# Patient Record
Sex: FEMALE | Race: WHITE | Employment: UNEMPLOYED | URBAN - METROPOLITAN AREA
[De-identification: names, ages, dates, MRNs, and addresses within clinical notes are randomized per-mention and may not be internally consistent; named-entity substitution may affect disease eponyms.]

---

## 2017-08-21 PROBLEM — Z72.0 TOBACCO ABUSE: Status: ACTIVE | Noted: 2017-08-21

## 2017-11-07 ENCOUNTER — HOSPITAL ENCOUNTER (OUTPATIENT)
Age: 57
Discharge: HOME | End: 2017-11-07
Payer: COMMERCIAL

## 2017-11-07 DIAGNOSIS — R25.2 MUSCLE CRAMPS: ICD-10-CM

## 2017-11-07 DIAGNOSIS — E78.5 HYPERLIPIDEMIA, UNSPECIFIED HYPERLIPIDEMIA TYPE: ICD-10-CM

## 2017-11-07 DIAGNOSIS — I10 ESSENTIAL HYPERTENSION: ICD-10-CM

## 2017-11-07 LAB
ALBUMIN SERPL-MCNC: 4.9 G/DL (ref 3.5–5.2)
ALBUMIN/GLOBULIN RATIO: ABNORMAL (ref 1–2.5)
ALP BLD-CCNC: 146 U/L (ref 35–104)
ALT SERPL-CCNC: 25 U/L (ref 5–33)
ANION GAP SERPL CALCULATED.3IONS-SCNC: 18 MMOL/L (ref 9–17)
AST SERPL-CCNC: 31 U/L
BILIRUB SERPL-MCNC: 0.94 MG/DL (ref 0.3–1.2)
BUN BLDV-MCNC: 11 MG/DL (ref 6–20)
BUN/CREAT BLD: 14 (ref 9–20)
CALCIUM SERPL-MCNC: 9.9 MG/DL (ref 8.6–10.4)
CHLORIDE BLD-SCNC: 86 MMOL/L (ref 98–107)
CHOLESTEROL, FASTING: 165 MG/DL
CHOLESTEROL/HDL RATIO: 2.6
CO2: 26 MMOL/L (ref 20–31)
CREAT SERPL-MCNC: 0.8 MG/DL (ref 0.5–0.9)
GFR AFRICAN AMERICAN: >60 ML/MIN
GFR NON-AFRICAN AMERICAN: >60 ML/MIN
GFR SERPL CREATININE-BSD FRML MDRD: ABNORMAL ML/MIN/{1.73_M2}
GFR SERPL CREATININE-BSD FRML MDRD: ABNORMAL ML/MIN/{1.73_M2}
GLUCOSE BLD-MCNC: 115 MG/DL (ref 70–99)
HDLC SERPL-MCNC: 63 MG/DL
LACTATE DEHYDROGENASE: 234 U/L (ref 135–214)
LDL CHOLESTEROL: 66 MG/DL (ref 0–130)
POTASSIUM SERPL-SCNC: 4 MMOL/L (ref 3.7–5.3)
SODIUM BLD-SCNC: 130 MMOL/L (ref 135–144)
TOTAL PROTEIN: 8.5 G/DL (ref 6.4–8.3)
TRIGLYCERIDE, FASTING: 180 MG/DL
VLDLC SERPL CALC-MCNC: ABNORMAL MG/DL (ref 1–30)

## 2017-11-07 PROCEDURE — 80053 COMPREHEN METABOLIC PANEL: CPT

## 2017-11-07 PROCEDURE — 83615 LACTATE (LD) (LDH) ENZYME: CPT

## 2017-11-07 PROCEDURE — 80061 LIPID PANEL: CPT

## 2017-11-07 PROCEDURE — 36415 COLL VENOUS BLD VENIPUNCTURE: CPT

## 2018-01-19 ENCOUNTER — HOSPITAL ENCOUNTER (OUTPATIENT)
Age: 58
Discharge: HOME | End: 2018-01-19
Payer: COMMERCIAL

## 2018-01-19 DIAGNOSIS — R89.9 ABNORMAL LABORATORY TEST: ICD-10-CM

## 2018-01-19 DIAGNOSIS — E78.5 HYPERLIPIDEMIA, UNSPECIFIED HYPERLIPIDEMIA TYPE: ICD-10-CM

## 2018-01-19 LAB
CHOLESTEROL/HDL RATIO: 4.6
CHOLESTEROL: 256 MG/DL
HDLC SERPL-MCNC: 56 MG/DL
LACTATE DEHYDROGENASE: 160 U/L (ref 135–214)
LDL CHOLESTEROL: 160 MG/DL (ref 0–130)
TRIGL SERPL-MCNC: 200 MG/DL
VLDLC SERPL CALC-MCNC: ABNORMAL MG/DL (ref 1–30)

## 2018-01-19 PROCEDURE — 83615 LACTATE (LD) (LDH) ENZYME: CPT

## 2018-01-19 PROCEDURE — 80061 LIPID PANEL: CPT

## 2018-01-19 PROCEDURE — 36415 COLL VENOUS BLD VENIPUNCTURE: CPT

## 2018-02-17 ENCOUNTER — HOSPITAL ENCOUNTER (OUTPATIENT)
Dept: MAMMOGRAPHY | Age: 58
Discharge: HOME | End: 2018-02-19
Payer: COMMERCIAL

## 2018-02-17 DIAGNOSIS — Z12.31 ENCOUNTER FOR SCREENING MAMMOGRAM FOR BREAST CANCER: ICD-10-CM

## 2018-03-03 ENCOUNTER — HOSPITAL ENCOUNTER (OUTPATIENT)
Dept: MAMMOGRAPHY | Age: 58
Discharge: HOME | End: 2018-03-05
Payer: COMMERCIAL

## 2018-03-03 DIAGNOSIS — Z12.39 BREAST CANCER SCREENING: ICD-10-CM

## 2018-03-03 DIAGNOSIS — Z12.31 ENCOUNTER FOR SCREENING MAMMOGRAM FOR BREAST CANCER: ICD-10-CM

## 2018-03-03 PROCEDURE — 77067 SCR MAMMO BI INCL CAD: CPT

## 2018-03-03 PROCEDURE — 77063 BREAST TOMOSYNTHESIS BI: CPT

## 2018-06-08 ENCOUNTER — HOSPITAL ENCOUNTER (OUTPATIENT)
Age: 58
Discharge: HOME | End: 2018-06-08
Payer: COMMERCIAL

## 2018-06-08 DIAGNOSIS — E78.5 HYPERLIPIDEMIA, UNSPECIFIED HYPERLIPIDEMIA TYPE: ICD-10-CM

## 2018-06-08 LAB
CHOLESTEROL, FASTING: 203 MG/DL
CHOLESTEROL/HDL RATIO: 3
HDLC SERPL-MCNC: 67 MG/DL
LACTATE DEHYDROGENASE: 160 U/L (ref 135–214)
LDL CHOLESTEROL: 85 MG/DL (ref 0–130)
TRIGLYCERIDE, FASTING: 257 MG/DL
VLDLC SERPL CALC-MCNC: ABNORMAL MG/DL (ref 1–30)

## 2018-06-08 PROCEDURE — 80061 LIPID PANEL: CPT

## 2018-06-08 PROCEDURE — 36415 COLL VENOUS BLD VENIPUNCTURE: CPT

## 2018-06-08 PROCEDURE — 83615 LACTATE (LD) (LDH) ENZYME: CPT

## 2018-08-20 PROBLEM — Z72.0 TOBACCO ABUSE: Status: RESOLVED | Noted: 2017-08-21 | Resolved: 2018-08-20

## 2019-03-07 ENCOUNTER — HOSPITAL ENCOUNTER (OUTPATIENT)
Age: 59
Discharge: HOME | End: 2019-03-07
Payer: COMMERCIAL

## 2019-03-07 DIAGNOSIS — I10 ESSENTIAL HYPERTENSION: ICD-10-CM

## 2019-03-07 DIAGNOSIS — R73.09 ELEVATED GLUCOSE LEVEL: ICD-10-CM

## 2019-03-07 LAB
ANION GAP SERPL CALCULATED.3IONS-SCNC: 16 MMOL/L (ref 9–17)
BUN BLDV-MCNC: 12 MG/DL (ref 6–20)
BUN/CREAT BLD: ABNORMAL (ref 9–20)
CALCIUM SERPL-MCNC: 9.4 MG/DL (ref 8.6–10.4)
CHLORIDE BLD-SCNC: 99 MMOL/L (ref 98–107)
CO2: 22 MMOL/L (ref 20–31)
CREAT SERPL-MCNC: 0.81 MG/DL (ref 0.5–0.9)
ESTIMATED AVERAGE GLUCOSE: 120 MG/DL
GFR AFRICAN AMERICAN: >60 ML/MIN
GFR NON-AFRICAN AMERICAN: >60 ML/MIN
GFR SERPL CREATININE-BSD FRML MDRD: ABNORMAL ML/MIN/{1.73_M2}
GFR SERPL CREATININE-BSD FRML MDRD: ABNORMAL ML/MIN/{1.73_M2}
GLUCOSE FASTING: 125 MG/DL (ref 70–99)
HBA1C MFR BLD: 5.8 % (ref 4–6)
POTASSIUM SERPL-SCNC: 4.8 MMOL/L (ref 3.7–5.3)
SODIUM BLD-SCNC: 137 MMOL/L (ref 135–144)

## 2019-03-07 PROCEDURE — 36415 COLL VENOUS BLD VENIPUNCTURE: CPT

## 2019-03-07 PROCEDURE — 80048 BASIC METABOLIC PNL TOTAL CA: CPT

## 2019-03-07 PROCEDURE — 83036 HEMOGLOBIN GLYCOSYLATED A1C: CPT

## 2019-08-13 PROBLEM — F41.0 PANIC DISORDER: Status: ACTIVE | Noted: 2019-08-13

## 2019-11-08 ENCOUNTER — HOSPITAL ENCOUNTER (OUTPATIENT)
Age: 59
Discharge: HOME | End: 2019-11-08
Payer: COMMERCIAL

## 2019-11-08 DIAGNOSIS — E78.5 HYPERLIPIDEMIA, UNSPECIFIED HYPERLIPIDEMIA TYPE: ICD-10-CM

## 2019-11-08 LAB
CHOLESTEROL, FASTING: 182 MG/DL
CHOLESTEROL/HDL RATIO: 3.3
HDLC SERPL-MCNC: 55 MG/DL
LDL CHOLESTEROL: 95 MG/DL (ref 0–130)
TRIGLYCERIDE, FASTING: 159 MG/DL
VLDLC SERPL CALC-MCNC: ABNORMAL MG/DL (ref 1–30)

## 2019-11-08 PROCEDURE — 80061 LIPID PANEL: CPT

## 2019-11-08 PROCEDURE — 36415 COLL VENOUS BLD VENIPUNCTURE: CPT

## 2020-11-04 PROBLEM — R73.01 IFG (IMPAIRED FASTING GLUCOSE): Status: ACTIVE | Noted: 2020-11-04

## 2020-11-18 ENCOUNTER — HOSPITAL ENCOUNTER (OUTPATIENT)
Age: 60
Discharge: HOME | End: 2020-11-18
Payer: COMMERCIAL

## 2020-11-18 ENCOUNTER — HOSPITAL ENCOUNTER (OUTPATIENT)
Dept: MAMMOGRAPHY | Age: 60
Discharge: HOME | End: 2020-11-20
Payer: COMMERCIAL

## 2020-11-18 LAB
ALBUMIN SERPL-MCNC: 4.3 G/DL (ref 3.5–5.2)
ALBUMIN/GLOBULIN RATIO: 1.2 (ref 1–2.5)
ALP BLD-CCNC: 90 U/L (ref 35–104)
ALT SERPL-CCNC: 21 U/L (ref 5–33)
ANION GAP SERPL CALCULATED.3IONS-SCNC: 15 MMOL/L (ref 9–17)
AST SERPL-CCNC: 25 U/L
BILIRUB SERPL-MCNC: 0.45 MG/DL (ref 0.3–1.2)
BUN BLDV-MCNC: 10 MG/DL (ref 6–20)
BUN/CREAT BLD: ABNORMAL (ref 9–20)
CALCIUM SERPL-MCNC: 9.4 MG/DL (ref 8.6–10.4)
CHLORIDE BLD-SCNC: 98 MMOL/L (ref 98–107)
CHOLESTEROL, FASTING: 189 MG/DL
CHOLESTEROL/HDL RATIO: 3.4
CO2: 22 MMOL/L (ref 20–31)
CREAT SERPL-MCNC: 0.65 MG/DL (ref 0.5–0.9)
ESTIMATED AVERAGE GLUCOSE: 117 MG/DL
GFR AFRICAN AMERICAN: >60 ML/MIN
GFR NON-AFRICAN AMERICAN: >60 ML/MIN
GFR SERPL CREATININE-BSD FRML MDRD: ABNORMAL ML/MIN/{1.73_M2}
GFR SERPL CREATININE-BSD FRML MDRD: ABNORMAL ML/MIN/{1.73_M2}
GLUCOSE FASTING: 128 MG/DL (ref 70–99)
HBA1C MFR BLD: 5.7 % (ref 4–6)
HDLC SERPL-MCNC: 56 MG/DL
LDL CHOLESTEROL: 102 MG/DL (ref 0–130)
POTASSIUM SERPL-SCNC: 4.7 MMOL/L (ref 3.7–5.3)
SODIUM BLD-SCNC: 135 MMOL/L (ref 135–144)
TOTAL PROTEIN: 7.8 G/DL (ref 6.4–8.3)
TRIGLYCERIDE, FASTING: 153 MG/DL
VLDLC SERPL CALC-MCNC: ABNORMAL MG/DL (ref 1–30)

## 2020-11-18 PROCEDURE — 83036 HEMOGLOBIN GLYCOSYLATED A1C: CPT

## 2020-11-18 PROCEDURE — 77063 BREAST TOMOSYNTHESIS BI: CPT

## 2020-11-18 PROCEDURE — 80053 COMPREHEN METABOLIC PANEL: CPT

## 2020-11-18 PROCEDURE — 80061 LIPID PANEL: CPT

## 2020-11-18 PROCEDURE — 36415 COLL VENOUS BLD VENIPUNCTURE: CPT

## 2021-10-26 ENCOUNTER — HOSPITAL ENCOUNTER (OUTPATIENT)
Age: 61
Discharge: HOME | End: 2021-10-26
Payer: COMMERCIAL

## 2021-10-26 ENCOUNTER — HOSPITAL ENCOUNTER (OUTPATIENT)
Age: 61
Setting detail: SPECIMEN
Discharge: HOME | End: 2021-10-26
Payer: COMMERCIAL

## 2021-10-26 DIAGNOSIS — I10 ESSENTIAL HYPERTENSION: ICD-10-CM

## 2021-10-26 DIAGNOSIS — R73.01 IFG (IMPAIRED FASTING GLUCOSE): ICD-10-CM

## 2021-10-26 DIAGNOSIS — F41.0 PANIC DISORDER: ICD-10-CM

## 2021-10-26 DIAGNOSIS — E78.5 HYPERLIPIDEMIA, UNSPECIFIED HYPERLIPIDEMIA TYPE: ICD-10-CM

## 2021-10-26 LAB
ALBUMIN SERPL-MCNC: 4.7 G/DL (ref 3.5–5.2)
ALBUMIN/GLOBULIN RATIO: 1.3 (ref 1–2.5)
ALP BLD-CCNC: 95 U/L (ref 35–104)
ALT SERPL-CCNC: 17 U/L (ref 5–33)
ANION GAP SERPL CALCULATED.3IONS-SCNC: 15 MMOL/L (ref 9–17)
AST SERPL-CCNC: 22 U/L
BILIRUB SERPL-MCNC: 0.56 MG/DL (ref 0.3–1.2)
BUN BLDV-MCNC: 11 MG/DL (ref 8–23)
BUN/CREAT BLD: ABNORMAL (ref 9–20)
CALCIUM SERPL-MCNC: 9.8 MG/DL (ref 8.6–10.4)
CHLORIDE BLD-SCNC: 92 MMOL/L (ref 98–107)
CHOLESTEROL, FASTING: 189 MG/DL
CHOLESTEROL/HDL RATIO: 3
CO2: 22 MMOL/L (ref 20–31)
CREAT SERPL-MCNC: 0.68 MG/DL (ref 0.5–0.9)
GFR AFRICAN AMERICAN: >60 ML/MIN
GFR NON-AFRICAN AMERICAN: >60 ML/MIN
GFR SERPL CREATININE-BSD FRML MDRD: ABNORMAL ML/MIN/{1.73_M2}
GFR SERPL CREATININE-BSD FRML MDRD: ABNORMAL ML/MIN/{1.73_M2}
GLUCOSE FASTING: 121 MG/DL (ref 70–99)
HDLC SERPL-MCNC: 63 MG/DL
LDL CHOLESTEROL: 103 MG/DL (ref 0–130)
POTASSIUM SERPL-SCNC: 4.5 MMOL/L (ref 3.7–5.3)
SODIUM BLD-SCNC: 129 MMOL/L (ref 135–144)
TOTAL PROTEIN: 8.2 G/DL (ref 6.4–8.3)
TRIGLYCERIDE, FASTING: 114 MG/DL
TSH SERPL DL<=0.05 MIU/L-ACNC: 2.33 MIU/L (ref 0.3–5)
VLDLC SERPL CALC-MCNC: NORMAL MG/DL (ref 1–30)

## 2021-10-26 PROCEDURE — 36415 COLL VENOUS BLD VENIPUNCTURE: CPT

## 2021-10-26 PROCEDURE — 80061 LIPID PANEL: CPT

## 2021-10-26 PROCEDURE — 84443 ASSAY THYROID STIM HORMONE: CPT

## 2021-10-26 PROCEDURE — 83036 HEMOGLOBIN GLYCOSYLATED A1C: CPT

## 2021-10-26 PROCEDURE — 80053 COMPREHEN METABOLIC PANEL: CPT

## 2021-10-27 LAB
ESTIMATED AVERAGE GLUCOSE: 117 MG/DL
HBA1C MFR BLD: 5.7 % (ref 4–6)

## 2021-10-28 LAB
HPV SAMPLE: NORMAL
HPV, GENOTYPE 16: NOT DETECTED
HPV, GENOTYPE 18: NOT DETECTED
HPV, HIGH RISK OTHER: NOT DETECTED
HPV, INTERPRETATION: NORMAL
SPECIMEN DESCRIPTION: NORMAL

## 2021-11-02 LAB — CYTOLOGY REPORT: NORMAL

## 2021-12-15 ENCOUNTER — HOSPITAL ENCOUNTER (OUTPATIENT)
Dept: MAMMOGRAPHY | Age: 61
Discharge: HOME | End: 2021-12-17
Payer: COMMERCIAL

## 2021-12-15 DIAGNOSIS — Z12.31 ENCOUNTER FOR SCREENING MAMMOGRAM FOR MALIGNANT NEOPLASM OF BREAST: ICD-10-CM

## 2021-12-15 PROCEDURE — 77063 BREAST TOMOSYNTHESIS BI: CPT

## 2022-11-03 ENCOUNTER — HOSPITAL ENCOUNTER (OUTPATIENT)
Age: 62
Setting detail: SPECIMEN
Discharge: HOME | End: 2022-11-03

## 2022-11-03 DIAGNOSIS — R73.01 IFG (IMPAIRED FASTING GLUCOSE): ICD-10-CM

## 2022-11-03 DIAGNOSIS — R21 RASH: ICD-10-CM

## 2022-11-03 DIAGNOSIS — E78.5 HYPERLIPIDEMIA, UNSPECIFIED HYPERLIPIDEMIA TYPE: ICD-10-CM

## 2022-11-03 DIAGNOSIS — I10 ESSENTIAL HYPERTENSION: ICD-10-CM

## 2022-11-03 LAB
ABSOLUTE EOS #: 0.27 K/UL (ref 0–0.44)
ABSOLUTE IMMATURE GRANULOCYTE: 0.07 K/UL (ref 0–0.3)
ABSOLUTE LYMPH #: 1.84 K/UL (ref 1.1–3.7)
ABSOLUTE MONO #: 1.07 K/UL (ref 0.1–1.2)
ALBUMIN SERPL-MCNC: 4.2 G/DL (ref 3.5–5.2)
ALBUMIN/GLOBULIN RATIO: 1.1 (ref 1–2.5)
ALP BLD-CCNC: 96 U/L (ref 35–104)
ALT SERPL-CCNC: 20 U/L (ref 5–33)
ANION GAP SERPL CALCULATED.3IONS-SCNC: 19 MMOL/L (ref 9–17)
AST SERPL-CCNC: 22 U/L
BASOPHILS # BLD: 1 % (ref 0–2)
BASOPHILS ABSOLUTE: 0.08 K/UL (ref 0–0.2)
BILIRUB SERPL-MCNC: 0.5 MG/DL (ref 0.3–1.2)
BUN BLDV-MCNC: 9 MG/DL (ref 8–23)
CALCIUM SERPL-MCNC: 9.8 MG/DL (ref 8.6–10.4)
CHLORIDE BLD-SCNC: 89 MMOL/L (ref 98–107)
CHOLESTEROL, FASTING: 186 MG/DL
CHOLESTEROL/HDL RATIO: 3.4
CO2: 24 MMOL/L (ref 20–31)
CREAT SERPL-MCNC: 0.71 MG/DL (ref 0.5–0.9)
EOSINOPHILS RELATIVE PERCENT: 2 % (ref 1–4)
ESTIMATED AVERAGE GLUCOSE: 140 MG/DL
GFR SERPL CREATININE-BSD FRML MDRD: >60 ML/MIN/1.73M2
GLUCOSE FASTING: 146 MG/DL (ref 70–99)
HBA1C MFR BLD: 6.5 % (ref 4–6)
HCT VFR BLD CALC: 40.4 % (ref 36.3–47.1)
HDLC SERPL-MCNC: 55 MG/DL
HEMOGLOBIN: 13.6 G/DL (ref 11.9–15.1)
IMMATURE GRANULOCYTES: 1 %
LDL CHOLESTEROL: 102 MG/DL (ref 0–130)
LYMPHOCYTES # BLD: 13 % (ref 24–43)
MCH RBC QN AUTO: 30.2 PG (ref 25.2–33.5)
MCHC RBC AUTO-ENTMCNC: 33.7 G/DL (ref 28.4–34.8)
MCV RBC AUTO: 89.8 FL (ref 82.6–102.9)
MONOCYTES # BLD: 7 % (ref 3–12)
NRBC AUTOMATED: 0 PER 100 WBC
PDW BLD-RTO: 12.8 % (ref 11.8–14.4)
PLATELET # BLD: 265 K/UL (ref 138–453)
PMV BLD AUTO: 11.5 FL (ref 8.1–13.5)
POTASSIUM SERPL-SCNC: 4.7 MMOL/L (ref 3.7–5.3)
RBC # BLD: 4.5 M/UL (ref 3.95–5.11)
SEG NEUTROPHILS: 76 % (ref 36–65)
SEGMENTED NEUTROPHILS ABSOLUTE COUNT: 11.18 K/UL (ref 1.5–8.1)
SODIUM BLD-SCNC: 132 MMOL/L (ref 135–144)
TOTAL PROTEIN: 8.1 G/DL (ref 6.4–8.3)
TRIGLYCERIDE, FASTING: 145 MG/DL
TSH SERPL DL<=0.05 MIU/L-ACNC: 0.56 UIU/ML (ref 0.3–5)
WBC # BLD: 14.5 K/UL (ref 3.5–11.3)

## 2023-05-02 ENCOUNTER — OFFICE VISIT (OUTPATIENT)
Dept: GYNECOLOGIC ONCOLOGY | Age: 63
End: 2023-05-02

## 2023-05-02 ENCOUNTER — HOSPITAL ENCOUNTER (OUTPATIENT)
Age: 63
Discharge: HOME | End: 2023-05-02
Payer: COMMERCIAL

## 2023-05-02 ENCOUNTER — HOSPITAL ENCOUNTER (OUTPATIENT)
Age: 63
Setting detail: SPECIMEN
Discharge: HOME | End: 2023-05-02

## 2023-05-02 VITALS
WEIGHT: 221.2 LBS | OXYGEN SATURATION: 94 % | DIASTOLIC BLOOD PRESSURE: 82 MMHG | HEIGHT: 63 IN | SYSTOLIC BLOOD PRESSURE: 138 MMHG | HEART RATE: 99 BPM | TEMPERATURE: 97.2 F | BODY MASS INDEX: 39.19 KG/M2

## 2023-05-02 DIAGNOSIS — C53.8 MALIGNANT NEOPLASM OF OVERLAPPING SITES OF CERVIX UTERI (HCC): Primary | ICD-10-CM

## 2023-05-02 DIAGNOSIS — N88.8 MASS OF CERVIX: ICD-10-CM

## 2023-05-02 DIAGNOSIS — C53.8 MALIGNANT NEOPLASM OF OVERLAPPING SITES OF CERVIX UTERI (HCC): ICD-10-CM

## 2023-05-02 LAB
ABSOLUTE EOS #: 0.15 K/UL (ref 0–0.44)
ABSOLUTE IMMATURE GRANULOCYTE: 0.09 K/UL (ref 0–0.3)
ABSOLUTE LYMPH #: 2.6 K/UL (ref 1.1–3.7)
ABSOLUTE MONO #: 0.82 K/UL (ref 0.1–1.2)
BASOPHILS # BLD: 1 % (ref 0–2)
BASOPHILS ABSOLUTE: 0.08 K/UL (ref 0–0.2)
EOSINOPHILS RELATIVE PERCENT: 1 % (ref 1–4)
HCT VFR BLD AUTO: 37.6 % (ref 36.3–47.1)
HGB BLD-MCNC: 13.1 G/DL (ref 11.9–15.1)
IMMATURE GRANULOCYTES: 1 %
LYMPHOCYTES # BLD: 23 % (ref 24–43)
MCH RBC QN AUTO: 31.4 PG (ref 25.2–33.5)
MCHC RBC AUTO-ENTMCNC: 34.8 G/DL (ref 28.4–34.8)
MCV RBC AUTO: 90.2 FL (ref 82.6–102.9)
MONOCYTES # BLD: 7 % (ref 3–12)
NRBC AUTOMATED: 0 PER 100 WBC
PDW BLD-RTO: 13.2 % (ref 11.8–14.4)
PLATELET # BLD AUTO: 218 K/UL (ref 138–453)
PMV BLD AUTO: 11.3 FL (ref 8.1–13.5)
RBC # BLD: 4.17 M/UL (ref 3.95–5.11)
SEG NEUTROPHILS: 67 % (ref 36–65)
SEGMENTED NEUTROPHILS ABSOLUTE COUNT: 7.5 K/UL (ref 1.5–8.1)
WBC # BLD AUTO: 11.2 K/UL (ref 3.5–11.3)

## 2023-05-02 PROCEDURE — 85025 COMPLETE CBC W/AUTO DIFF WBC: CPT

## 2023-05-02 PROCEDURE — 80053 COMPREHEN METABOLIC PANEL: CPT

## 2023-05-02 PROCEDURE — 36415 COLL VENOUS BLD VENIPUNCTURE: CPT

## 2023-05-02 ASSESSMENT — ENCOUNTER SYMPTOMS
GASTROINTESTINAL NEGATIVE: 1
RESPIRATORY NEGATIVE: 1
EYES NEGATIVE: 1

## 2023-05-03 ENCOUNTER — TELEPHONE (OUTPATIENT)
Dept: GYNECOLOGIC ONCOLOGY | Age: 63
End: 2023-05-03

## 2023-05-03 LAB
ALBUMIN SERPL-MCNC: 4.5 G/DL (ref 3.5–5.2)
ALBUMIN/GLOBULIN RATIO: 1.1 (ref 1–2.5)
ALP SERPL-CCNC: 103 U/L (ref 35–104)
ALT SERPL-CCNC: 22 U/L (ref 5–33)
ANION GAP SERPL CALCULATED.3IONS-SCNC: 28 MMOL/L (ref 9–17)
AST SERPL-CCNC: 38 U/L
BILIRUB SERPL-MCNC: 0.5 MG/DL (ref 0.3–1.2)
BUN SERPL-MCNC: 11 MG/DL (ref 8–23)
CALCIUM SERPL-MCNC: 11 MG/DL (ref 8.6–10.4)
CHLORIDE SERPL-SCNC: 104 MMOL/L (ref 98–107)
CO2 SERPL-SCNC: 15 MMOL/L (ref 20–31)
CREAT SERPL-MCNC: 0.8 MG/DL (ref 0.5–0.9)
GFR SERPL CREATININE-BSD FRML MDRD: >60 ML/MIN/1.73M2
GLUCOSE SERPL-MCNC: 138 MG/DL (ref 70–99)
POTASSIUM SERPL-SCNC: 4.8 MMOL/L (ref 3.7–5.3)
PROT SERPL-MCNC: 8.6 G/DL (ref 6.4–8.3)
SODIUM SERPL-SCNC: 147 MMOL/L (ref 135–144)

## 2023-05-03 NOTE — TELEPHONE ENCOUNTER
BENEDICT for patient ,notifying her that the Video visit was moved to 5/12/23 @12:45pm with Dr. Earlene Valdovinos

## 2023-05-04 ENCOUNTER — TELEPHONE (OUTPATIENT)
Dept: GYNECOLOGIC ONCOLOGY | Age: 63
End: 2023-05-04

## 2023-05-04 NOTE — TELEPHONE ENCOUNTER
Was seen 2 days ago and she has some spotting last night had to put a pad on to sleep and today is very light is this normal?  No other systems

## 2023-05-04 NOTE — RESULT ENCOUNTER NOTE
Please inform the patient regarding the normal CBC and CMP, with the exception of Blood Glucose of 138 (H), Calcium 11.0 (H), Sodium 147 (H), CO2 15 (L), Anion Gap 28 (H) and AST 38 (H).      Awaiting biopsy results    PET-scan noted to be scheduled for 05/12.      Thank you for your assistance,  Edmar Campos MD

## 2023-05-05 LAB — SURGICAL PATHOLOGY REPORT: NORMAL

## 2023-05-05 NOTE — RESULT ENCOUNTER NOTE
Called and spoke to patient regarding her results. Patient voiced understanding and stated she will call her pcp regarding high results. I let her know we will call her when we get biopsy results.

## 2023-05-05 NOTE — TELEPHONE ENCOUNTER
Called patient with Dr. Eli Morillo response. Patient voiced understanding and states that as of right now, the bleeding has stopped.

## 2023-05-08 ENCOUNTER — TELEPHONE (OUTPATIENT)
Dept: GYNECOLOGIC ONCOLOGY | Age: 63
End: 2023-05-08

## 2023-05-08 ENCOUNTER — HOSPITAL ENCOUNTER (OUTPATIENT)
Age: 63
Discharge: HOME | End: 2023-05-08
Payer: COMMERCIAL

## 2023-05-08 DIAGNOSIS — C53.8 MALIGNANT NEOPLASM OF OVERLAPPING SITES OF CERVIX UTERI (HCC): Primary | ICD-10-CM

## 2023-05-08 DIAGNOSIS — R89.9 ABNORMAL LABORATORY TEST RESULT: ICD-10-CM

## 2023-05-08 LAB
25(OH)D3 SERPL-MCNC: 34.3 NG/ML
EST. AVERAGE GLUCOSE BLD GHB EST-MCNC: 140 MG/DL
HBA1C MFR BLD: 6.5 % (ref 4–6)
HCT VFR BLD AUTO: 36.9 % (ref 36.3–47.1)
HGB BLD-MCNC: 12.6 G/DL (ref 11.9–15.1)

## 2023-05-08 PROCEDURE — 85018 HEMOGLOBIN: CPT

## 2023-05-08 PROCEDURE — 83970 ASSAY OF PARATHORMONE: CPT

## 2023-05-08 PROCEDURE — 83036 HEMOGLOBIN GLYCOSYLATED A1C: CPT

## 2023-05-08 PROCEDURE — 85014 HEMATOCRIT: CPT

## 2023-05-08 PROCEDURE — 82330 ASSAY OF CALCIUM: CPT

## 2023-05-08 PROCEDURE — 36415 COLL VENOUS BLD VENIPUNCTURE: CPT

## 2023-05-08 PROCEDURE — 82306 VITAMIN D 25 HYDROXY: CPT

## 2023-05-08 NOTE — TELEPHONE ENCOUNTER
Pt called stating that since biopsy on 5/2/23 she is experiencing heavier vaginal bleeding - saturating 3 pads at night (8 hour period) with thick clotting and increased fatigue. Pt does state that the bleeding is heavier at night. Pt denies being lightheaded and no abdominal pain. Please advise pt.

## 2023-05-08 NOTE — TELEPHONE ENCOUNTER
Returned call to discuss patient symptoms. Patient had labs drawn today and we will add on H&H given her fatigue to ensure stability. Reviewed CBC from 5/2/23 Hbg 13.1. We reviewed expected symptomology of bleeding and when to notify office if heavy bleeding occurs. We then reviewed her biopsy positive for squamous cell carcinoma of the cervix. I placed a referral to radiation oncology to schedule NP appointment next week. She is understandable PET/CT scan on 5/12 and appointment to follow with Dr. Sophy Terry to discuss definitive care plan. Patient was understanding of diagnosis and care plan.

## 2023-05-09 LAB
CA-I BLD-SCNC: 1.3 MMOL/L (ref 1.13–1.33)
PTH-INTACT SERPL-MCNC: 15.9 PG/ML (ref 14–72)

## 2023-05-12 ENCOUNTER — TELEMEDICINE (OUTPATIENT)
Dept: GYNECOLOGIC ONCOLOGY | Age: 63
End: 2023-05-12

## 2023-05-12 ENCOUNTER — HOSPITAL ENCOUNTER (OUTPATIENT)
Dept: NUCLEAR MEDICINE | Age: 63
End: 2023-05-12
Payer: COMMERCIAL

## 2023-05-12 DIAGNOSIS — C53.8 MALIGNANT NEOPLASM OF OVERLAPPING SITES OF CERVIX UTERI (HCC): Primary | ICD-10-CM

## 2023-05-12 DIAGNOSIS — C53.8 MALIGNANT NEOPLASM OF OVERLAPPING SITES OF CERVIX UTERI (HCC): ICD-10-CM

## 2023-05-12 LAB — GLUCOSE BLD-MCNC: 160 MG/DL (ref 65–105)

## 2023-05-12 PROCEDURE — A9552 F18 FDG: HCPCS | Performed by: OBSTETRICS & GYNECOLOGY

## 2023-05-12 PROCEDURE — 2580000003 HC RX 258: Performed by: OBSTETRICS & GYNECOLOGY

## 2023-05-12 PROCEDURE — 82947 ASSAY GLUCOSE BLOOD QUANT: CPT

## 2023-05-12 PROCEDURE — 3430000000 HC RX DIAGNOSTIC RADIOPHARMACEUTICAL: Performed by: OBSTETRICS & GYNECOLOGY

## 2023-05-12 PROCEDURE — 78815 PET IMAGE W/CT SKULL-THIGH: CPT

## 2023-05-12 RX ORDER — SODIUM CHLORIDE 0.9 % (FLUSH) 0.9 %
10 SYRINGE (ML) INJECTION PRN
Status: DISCONTINUED | OUTPATIENT
Start: 2023-05-12 | End: 2023-05-15 | Stop reason: HOSPADM

## 2023-05-12 RX ORDER — FLUDEOXYGLUCOSE F 18 200 MCI/ML
10 INJECTION, SOLUTION INTRAVENOUS
Status: COMPLETED | OUTPATIENT
Start: 2023-05-12 | End: 2023-05-12

## 2023-05-12 RX ADMIN — FLUDEOXYGLUCOSE F 18 11.04 MILLICURIE: 200 INJECTION, SOLUTION INTRAVENOUS at 08:28

## 2023-05-12 RX ADMIN — SODIUM CHLORIDE, PRESERVATIVE FREE 10 ML: 5 INJECTION INTRAVENOUS at 08:28

## 2023-05-12 ASSESSMENT — ENCOUNTER SYMPTOMS
EYES NEGATIVE: 1
GASTROINTESTINAL NEGATIVE: 1
RESPIRATORY NEGATIVE: 1

## 2023-05-15 ENCOUNTER — TELEPHONE (OUTPATIENT)
Dept: ONCOLOGY | Age: 63
End: 2023-05-15

## 2023-05-15 NOTE — TELEPHONE ENCOUNTER
Name: Jonathon Hatfield  : 1960  MRN: [id number]    Oncology Navigation- Initial Note:    Intake-  Contact Type: Telephone    Continuum of Care: Diagnosis/Active Treatment    Notes: Introductory phone call made to patient, instructed patient writer will be navigating oncology care & may call writer with any questions/concerns @ [phone] prn. Discussed potential barriers to care, pt denied any. Inquired on smoking, alcohol/drug use, pt stated former 1PPD smoker x40 years & quit 5 years ago. Pt stated drinks 2-3 beers on w/e days. Pt stated consumes marijuana edibles daily. Discussed community resources w/pt. Offered referrals to [facility]. Pt agreeable to [facility] referral & declined [facility] @ this time. Pt confirmed  Dr. Juan Agosto consult &  Dr. Maggie Benavidez consult. Pt denied questions/concerns. Encouraged to contact writer prn. GOLD Del Valle, updated on pt & referral.  Will continue to follow.      Electronically signed by Jefferson Villalba RN on 5/15/2023 at 2:54 PM T

## 2023-05-16 ENCOUNTER — TELEPHONE (OUTPATIENT)
Dept: ONCOLOGY | Age: 63
End: 2023-05-16

## 2023-05-16 ENCOUNTER — HOSPITAL ENCOUNTER (OUTPATIENT)
Dept: RADIATION ONCOLOGY | Age: 63
Discharge: HOME | End: 2023-05-16
Payer: COMMERCIAL

## 2023-05-16 VITALS
BODY MASS INDEX: 39.35 KG/M2 | DIASTOLIC BLOOD PRESSURE: 78 MMHG | WEIGHT: 218.6 LBS | SYSTOLIC BLOOD PRESSURE: 129 MMHG | TEMPERATURE: 98.4 F | HEART RATE: 103 BPM | RESPIRATION RATE: 16 BRPM

## 2023-05-16 DIAGNOSIS — C53.9: Primary | ICD-10-CM

## 2023-05-16 PROCEDURE — 99213 OFFICE O/P EST LOW 20 MIN: CPT | Performed by: RADIOLOGY

## 2023-05-16 ASSESSMENT — PAIN SCALES - GENERAL: PAINLEVEL_OUTOF10: 0

## 2023-05-16 NOTE — PROGRESS NOTES
Intimate Partner Violence: Not on file   Housing Stability: Not on file             FALLS RISK SCREEN  Instructions:  Assess the patient and enter the appropriate indicators that are present for fall risk identification. Total the numbers entered and assign a fall risk score from Table 2.  Reassess patient at a minimum every 12 weeks or with status change. Assessment   Date  5/16/2023     1. Mental Ability: confusion/cognitively impaired 0     2. Elimination Issues: incontinence, frequency 0       3. Ambulatory: use of assistive devices (walker, cane, off-loading devices),        attached to equipment (IV pole, oxygen) 0     4. Sensory Limitations: dizziness, vertigo, impaired vision 0     5. Age less than 65        0     6. Age 72 or greater 0     7. Medication: diuretics, strong analgesics, hypnotics, sedatives,        antihypertensive agents 3   8. Falls:  recent history of falls within the last 3 months (not to include slipping or        tripping) 0   TOTAL 3    If score of 4 or greater was education given? N/A       TABLE 2   Risk Score Risk Level Plan of Care   0-3 Little or  No Risk 1. Provide assistance as indicated for ambulation activities  2. Reorient confused/cognitively impaired patient  3. Call-light/bell within patient's reach  4. Chair/bed in low position, stretcher/bed with siderails up except when performing patient care activities  5. Educate patient/family/caregiver on falls prevention  6.  Reassess in 12 weeks or with any noted change in patient condition which places them at a risk for a fall   4-6 Moderate Risk 1. Provide assistance as indicated for ambulation activities  2. Reorient confused/cognitively impaired patient  3. Call-light/bell within patient's reach  4. Chair/bed in low position, stretcher/bed with siderails up except when performing patient care activities  5. Educate patient/family/caregiver on falls prevention     7 or   Higher High Risk 1.   Place

## 2023-05-16 NOTE — TELEPHONE ENCOUNTER
Name: Bernice Oliveros  : 1960  MRN: [id number]    Oncology Navigation Follow-Up Note    Contact Type:  Radiation Oncology    Notes: Pt @ [facility] for Dr. Marilyn Cuenca consult. Met w/pt & pt's daughter prior to consult. [facility] written materials along w/writer's business card given to pt. Encouraged pt to contact writer prn. Will continue to follow.     Electronically signed by Phylicia Landrum RN on 2023 at 9:46 AM

## 2023-05-16 NOTE — ACP (ADVANCE CARE PLANNING)
Advance Care Planning     Hospice Services: Patient is not currently receiving hospice services/has not received hospice care within the performance year. Advance Care Planning was discussed with patient, and patient declined assistance with ACP documentation. Patient has daughters listed as decision makers.

## 2023-05-16 NOTE — CONSULTS
[facility]            Radiation Oncology          [address] [place] [address]          [address]        Mamie Mock: [phone]        F: [phone]       [employer]             2023    Patient: Cristina Youngblood   YOB: 1960       Dear Dr Veronika Wellington: Thank you for referring Jaleel Pearce I Nail to me for evaluation. Below are the relevant portions of my assessment and plan of care. If you have questions, please do not hesitate to call me. I look forward to following this patient along with you. Sincerely,    Electronically signed by Maurice Sumner MD on 2023 at 10:34 AM    CC: Patient Care Team:  Sharlet Bamberger, DO as PCP - General (Family Medicine)  Sharlet Bamberger, DO as PCP - Empaneled Provider  Fabiano Gong RN as Nurse Navigator (Oncology)  ------------------------------------------------------------------------------------------------------------------------------------------------------------------------------------------      RADIATION ONCOLOGY NEW PATIENT VISIT:    Date of Service: 2023    Location:  [address] [place] [address],   [address] [place] [address]., Lynsey Rodriguez   [phone]     Patient ID:   Cristina Youngblood  : 1960   MRN: [id number]    CHIEF COMPLAINT: \"I am here to discuss radiation\"    DIAGNOSIS:  Squamous cell carcinoma of the cervix, primary lesion measures 4.8 cm in the greatest dimension, no documented vaginal or parametrial involvement. FDG PET shows uptake in the left common iliac lymph node. She is staged as FIGO IIIC1. MRI pelvis is pending. HISTORY OF PRESENT ILLNESS:   Jesusita Gonzalez is a 65 y.o. female who reported a new complaint of vaginal bleeding over the last few weeks. She was seen by GYN oncology and had an exam which showed the presence of approximately 4.8 cm mass in the cervix. A biopsy of the mass was consistent with squamous cell carcinoma.   She had staging with FDG PET which

## 2023-05-19 ENCOUNTER — INITIAL CONSULT (OUTPATIENT)
Dept: ONCOLOGY | Age: 63
End: 2023-05-19
Payer: COMMERCIAL

## 2023-05-19 ENCOUNTER — HOSPITAL ENCOUNTER (OUTPATIENT)
Age: 63
Discharge: HOME | End: 2023-05-19
Payer: COMMERCIAL

## 2023-05-19 VITALS
HEART RATE: 103 BPM | WEIGHT: 219.1 LBS | TEMPERATURE: 97.5 F | DIASTOLIC BLOOD PRESSURE: 83 MMHG | SYSTOLIC BLOOD PRESSURE: 141 MMHG | BODY MASS INDEX: 36.5 KG/M2 | HEIGHT: 65 IN

## 2023-05-19 DIAGNOSIS — C53.9 MALIGNANT NEOPLASM OF CERVIX, UNSPECIFIED SITE (HCC): Primary | ICD-10-CM

## 2023-05-19 DIAGNOSIS — E83.52 HYPERCALCEMIA: ICD-10-CM

## 2023-05-19 DIAGNOSIS — C53.9 MALIGNANT NEOPLASM OF CERVIX, UNSPECIFIED SITE (HCC): ICD-10-CM

## 2023-05-19 DIAGNOSIS — N95.0 POSTMENOPAUSAL BLEEDING: ICD-10-CM

## 2023-05-19 LAB
ALBUMIN SERPL-MCNC: 4.3 G/DL (ref 3.5–5.2)
ALBUMIN/GLOB SERPL: 1.2 {RATIO} (ref 1–2.5)
ALP SERPL-CCNC: 104 U/L (ref 35–104)
ALT SERPL-CCNC: 17 U/L (ref 5–33)
ANION GAP SERPL CALCULATED.3IONS-SCNC: 12 MMOL/L (ref 9–17)
AST SERPL-CCNC: 19 U/L
BASOPHILS # BLD: 0.1 K/UL (ref 0–0.2)
BASOPHILS NFR BLD: 1 % (ref 0–2)
BILIRUB SERPL-MCNC: 0.4 MG/DL (ref 0.3–1.2)
BUN SERPL-MCNC: 12 MG/DL (ref 8–23)
CA-I BLD-SCNC: 1.27 MMOL/L (ref 1.13–1.33)
CALCIUM SERPL-MCNC: 9.9 MG/DL (ref 8.6–10.4)
CHLORIDE SERPL-SCNC: 95 MMOL/L (ref 98–107)
CO2 SERPL-SCNC: 24 MMOL/L (ref 20–31)
CREAT SERPL-MCNC: 0.69 MG/DL (ref 0.5–0.9)
EOSINOPHIL # BLD: 0.1 K/UL (ref 0–0.4)
EOSINOPHILS RELATIVE PERCENT: 1 % (ref 1–4)
ERYTHROCYTE [DISTWIDTH] IN BLOOD BY AUTOMATED COUNT: 13.2 % (ref 12.5–15.4)
GFR SERPL CREATININE-BSD FRML MDRD: >60 ML/MIN/1.73M2
GLUCOSE SERPL-MCNC: 205 MG/DL (ref 70–99)
HCT VFR BLD AUTO: 36.6 % (ref 36–46)
HGB BLD-MCNC: 12.4 G/DL (ref 12–16)
LYMPHOCYTES # BLD: 17 % (ref 24–44)
LYMPHOCYTES NFR BLD: 1.9 K/UL (ref 1–4.8)
MCH RBC QN AUTO: 31 PG (ref 26–34)
MCHC RBC AUTO-ENTMCNC: 34 G/DL (ref 31–37)
MCV RBC AUTO: 91.3 FL (ref 80–100)
MONOCYTES NFR BLD: 0.8 K/UL (ref 0.1–1.2)
MONOCYTES NFR BLD: 8 % (ref 2–11)
NEUTROPHILS NFR BLD: 73 % (ref 36–66)
NEUTS SEG NFR BLD: 7.9 K/UL (ref 1.8–7.7)
PLATELET # BLD AUTO: 206 K/UL (ref 140–450)
PMV BLD AUTO: 8.7 FL (ref 6–12)
POTASSIUM SERPL-SCNC: 4.3 MMOL/L (ref 3.7–5.3)
PROT SERPL-MCNC: 8 G/DL (ref 6.4–8.3)
RBC # BLD AUTO: 4.01 M/UL (ref 4–5.2)
SODIUM SERPL-SCNC: 131 MMOL/L (ref 135–144)
WBC OTHER # BLD: 10.8 K/UL (ref 3.5–11)

## 2023-05-19 PROCEDURE — 99245 OFF/OP CONSLTJ NEW/EST HI 55: CPT | Performed by: INTERNAL MEDICINE

## 2023-05-19 PROCEDURE — 85025 COMPLETE CBC W/AUTO DIFF WBC: CPT

## 2023-05-19 PROCEDURE — 36415 COLL VENOUS BLD VENIPUNCTURE: CPT

## 2023-05-19 PROCEDURE — 3079F DIAST BP 80-89 MM HG: CPT | Performed by: INTERNAL MEDICINE

## 2023-05-19 PROCEDURE — 99202 OFFICE O/P NEW SF 15 MIN: CPT | Performed by: INTERNAL MEDICINE

## 2023-05-19 PROCEDURE — 3077F SYST BP >= 140 MM HG: CPT | Performed by: INTERNAL MEDICINE

## 2023-05-19 PROCEDURE — 80053 COMPREHEN METABOLIC PANEL: CPT

## 2023-05-19 PROCEDURE — 82330 ASSAY OF CALCIUM: CPT

## 2023-05-19 RX ORDER — ALPRAZOLAM 0.5 MG/1
0.5 TABLET ORAL 2 TIMES DAILY PRN
Qty: 20 TABLET | Refills: 0 | Status: SHIPPED | OUTPATIENT
Start: 2023-05-19 | End: 2023-05-29

## 2023-05-19 NOTE — PROGRESS NOTES
Thierry Oswald Nail                                                                                                                  5/19/2023  MRN:   [id number]  YOB: 1960  PCP:                           Milena العراقي DO  Referring Physician: Marley  Treating Physician Name: Mariluz Griggs MD      Reason for consultation:  Chief Complaint   Patient presents with    Consultation   Presents to Clinic to establish care recently diagnosed squamous cell carcinoma of cervix    Current problems:  Squamous cell carcinoma of cervix, FIGO III C1  Anxiety  Hypercalcemia  Comorbidity including dyslipidemia hypertension and BMI 36    Active and recent treatments:  Work-up for hypercalcemia  Anticipating concurrent chemoradiation    Summary of Case/History:    Edi Gonzalez a 65 y. o.female is a patient with recently diagnosed squamous cell carcinoma of cervix Presents to clinic to establish care and for further and evaluation. Patient initially saw her primary care physician for vaginal bleeding. Patient has had menopause. Patient was subsequently referred to gynecological oncology. Patient denies any abdominal pain or pelvic symptoms. Patient underwent gynecological exam which showed 4.8 cm mass on the cervix. There was also small amount of blood noted in the vaginal vault. Bimanual examination was limited due to be obtained body habitus. Biopsy taken came back as well-differentiated squamous cell carcinoma. Patient underwent CT PET which showed intense hypermetabolism centered at the cervix extending into the upper vaginal compatible with patient history of cervical carcinoma. There was also finding concerning for gigi metastasis in the left common iliac chain. Patient has been seen by radiation oncology. Patient lab work-up done earlier showed hypercalcemia. Patient PTH was within range. Patient vitamin D was not elevated.   Patient does not take any calcium or vitamin D

## 2023-05-22 ENCOUNTER — TELEPHONE (OUTPATIENT)
Dept: ONCOLOGY | Age: 63
End: 2023-05-22

## 2023-05-22 DIAGNOSIS — C53.9 MALIGNANT NEOPLASM OF CERVIX, UNSPECIFIED SITE (HCC): Primary | ICD-10-CM

## 2023-05-22 NOTE — TELEPHONE ENCOUNTER
AVS from 5/19/23      Labs now   Rv c 1 d 1      Labs done today    AVS given to  to follow precert    Pt was given AVS and appointment schedule    Electronically signed by Opal Mojica on 5/22/2023 at 7:54 AM

## 2023-05-22 NOTE — TELEPHONE ENCOUNTER
Name: Namita Lugo  : 1960  MRN: [id number]    Oncology Navigation Follow-Up Note    Contact Type:  Telephone    Notes: Spoke w/[facility] , to inquire on port placement. Christen Catalan requested new IR referral w/directions for port placement. New referral placed. Christen Catalan stated will contact pt. Will continue to follow.     Electronically signed by Katelyn Dumont RN on 2023 at 12:26 PM

## 2023-05-23 DIAGNOSIS — C53.9 MALIGNANT NEOPLASM OF CERVIX, UNSPECIFIED SITE (HCC): Primary | ICD-10-CM

## 2023-05-26 ENCOUNTER — CLINICAL DOCUMENTATION (OUTPATIENT)
Facility: HOSPITAL | Age: 63
End: 2023-05-26

## 2023-05-26 ENCOUNTER — HOSPITAL ENCOUNTER (OUTPATIENT)
Dept: MRI IMAGING | Age: 63
Discharge: HOME | End: 2023-05-26
Payer: COMMERCIAL

## 2023-05-26 DIAGNOSIS — C53.9: ICD-10-CM

## 2023-05-26 PROCEDURE — A9579 GAD-BASE MR CONTRAST NOS,1ML: HCPCS | Performed by: RADIOLOGY

## 2023-05-26 PROCEDURE — 6360000004 HC RX CONTRAST MEDICATION: Performed by: RADIOLOGY

## 2023-05-26 PROCEDURE — 2580000003 HC RX 258: Performed by: RADIOLOGY

## 2023-05-26 PROCEDURE — 72197 MRI PELVIS W/O & W/DYE: CPT

## 2023-05-26 RX ORDER — SODIUM CHLORIDE 0.9 % (FLUSH) 0.9 %
10 SYRINGE (ML) INJECTION ONCE
Status: COMPLETED | OUTPATIENT
Start: 2023-05-26 | End: 2023-05-26

## 2023-05-26 RX ORDER — 0.9 % SODIUM CHLORIDE 0.9 %
40 INTRAVENOUS SOLUTION INTRAVENOUS ONCE
Status: COMPLETED | OUTPATIENT
Start: 2023-05-26 | End: 2023-05-26

## 2023-05-26 RX ADMIN — GADOTERIDOL 20 ML: 279.3 INJECTION, SOLUTION INTRAVENOUS at 13:20

## 2023-05-26 RX ADMIN — SODIUM CHLORIDE, PRESERVATIVE FREE 10 ML: 5 INJECTION INTRAVENOUS at 13:21

## 2023-05-26 RX ADMIN — SODIUM CHLORIDE 40 ML: 9 INJECTION, SOLUTION INTRAVENOUS at 13:20

## 2023-05-26 NOTE — PROGRESS NOTES
Called and left message to speak with patient about the [facility] Cervical Cancer Fund that will help with her ded and oop expenses.

## 2023-05-30 ENCOUNTER — HOSPITAL ENCOUNTER (OUTPATIENT)
Dept: RADIATION ONCOLOGY | Age: 63
Discharge: HOME | End: 2023-05-30
Payer: COMMERCIAL

## 2023-05-30 ENCOUNTER — OFFICE VISIT (OUTPATIENT)
Dept: ONCOLOGY | Age: 63
End: 2023-05-30

## 2023-05-30 ENCOUNTER — TELEPHONE (OUTPATIENT)
Dept: INFUSION THERAPY | Age: 63
End: 2023-05-30

## 2023-05-30 DIAGNOSIS — C53.9 MALIGNANT NEOPLASM OF CERVIX, UNSPECIFIED SITE (HCC): Primary | ICD-10-CM

## 2023-05-30 PROCEDURE — 99999 PR OFFICE/OUTPT VISIT,PROCEDURE ONLY: CPT | Performed by: INTERNAL MEDICINE

## 2023-05-30 PROCEDURE — 77334 RADIATION TREATMENT AID(S): CPT | Performed by: RADIOLOGY

## 2023-05-30 PROCEDURE — 77470 SPECIAL RADIATION TREATMENT: CPT | Performed by: RADIOLOGY

## 2023-05-30 RX ORDER — PROCHLORPERAZINE MALEATE 10 MG
10 TABLET ORAL EVERY 6 HOURS PRN
Qty: 60 TABLET | Refills: 3 | Status: SHIPPED | OUTPATIENT
Start: 2023-05-30

## 2023-05-30 RX ORDER — ONDANSETRON 4 MG/1
4 TABLET, ORALLY DISINTEGRATING ORAL EVERY 8 HOURS PRN
Qty: 30 TABLET | Refills: 3 | Status: SHIPPED | OUTPATIENT
Start: 2023-05-30 | End: 2023-06-29

## 2023-05-30 NOTE — DISCHARGE INSTRUCTIONS
[place]'s website at : www.cancer.gov/about-cancer/copingfeelings/relaxation  See Facing Forward: Life After Cancer Treatment at: www.cancer.gov/publications/patient-education/facing-forward  Eat and drink well. It can be easier to eat if you have 5 or 6 small meals each day, rather than 3 large ones. Keep foods around that are easy to fix, such as canned soups, frozen meals, yogurt, and cottage cheese. Drink plenty of fluids each day-about 8 cups of water or juice. Plan a work schedule that is right for you. Fatigue may affect the amount of energy you have for your job. You may feel well enough to work your full schedule, or you may need to work less-maybe just a few hours a day or a few days each week. You may want to talk with your boss about ways to work from home so you do not need to commute. If possible, you may want to think about going on medical leave while you have radiation therapy. Ways to Manage Nausea and Vomiting    Plan when to eat and drink. Some people feel better when they eat before getting radiation therapy and others do not. Learn the best time for you to each and drink. Try a light snack, such as crackers and apple juice 1 to 2 hours before radiation therapy. Or, you might feel better if you have treatment on an empty stomach, which means not eating 2 or 3 hours before treatment. Eat small meals and snacks. Many people find that they eat better if they eat 5 or 6 small meals and snacks each day, rather than 3 large meals. Make sure to eat slowly and do not rush. Have foods and drinks that are at room temperature (not too hot and not too cold). Before eating or drinking, give hot food and drinks a chance to cool down. Warm cold food and drinks in the microwave for a short time. Talk with your doctor or nurse. He or she may suggest a special diet or prescribe medicine to help prevent nausea.  You might also ask your doctor or nurse about acupuncture, which may help relieve nausea and

## 2023-05-30 NOTE — PROGRESS NOTES
Leah and her daughter here for chemotherapy teaching. Spent 60 minutes with them   Teaching sheets from [facility] and verbal information given on chemotherapy agents, action, administration and side effects. Provided books chemotherapy and you and Eating Hints: Before During, and After Cancer treatment. Handout about unit with phone numbers for [facility] and [address] [place] [address] hematology/oncology associates provided.     Drugs discussed: Cisplatin     Discussed implanted port and demonstrated   Port to be placed tomorrow     Reviewed anti emetic medications, prescriptions for Zofran and Compazine sent to [facility], instructions on use     [employer] discussed and brochures given     Psychosocial and dietary assessments completed by Rt     Consent signed     Questions answered, support given   Patient tearful with hair loss, support given

## 2023-05-30 NOTE — PROGRESS NOTES
Pt here today for teach and simulation scan. Radiation and You information provided along with additional education regarding radiation therapy and what to expect. Pt verbalizes understanding and all questions answered to the best of my knowledge. Samples of aquaphor and community resource information provided. Pt escorted to CT room without any difficulty. Patient here with supportive daughter and discussed plan for concurrent chemo/RT to start on 6/14/23. Patient also have HDR following this. Patient guided to moisturize with Aquaphor daily as well.

## 2023-05-31 ENCOUNTER — HOSPITAL ENCOUNTER (OUTPATIENT)
Dept: INTERVENTIONAL RADIOLOGY/VASCULAR | Age: 63
Discharge: HOME | End: 2023-06-02
Payer: COMMERCIAL

## 2023-05-31 VITALS
WEIGHT: 214 LBS | TEMPERATURE: 97.2 F | SYSTOLIC BLOOD PRESSURE: 150 MMHG | OXYGEN SATURATION: 95 % | DIASTOLIC BLOOD PRESSURE: 77 MMHG | HEIGHT: 62 IN | HEART RATE: 97 BPM | BODY MASS INDEX: 39.38 KG/M2 | RESPIRATION RATE: 20 BRPM

## 2023-05-31 DIAGNOSIS — C53.9 MALIGNANT NEOPLASM OF CERVIX, UNSPECIFIED SITE (HCC): ICD-10-CM

## 2023-05-31 LAB
INR PPP: 1.1
PROTHROMBIN TIME: 14 SEC (ref 11.5–14.2)

## 2023-05-31 PROCEDURE — 2580000003 HC RX 258: Performed by: RADIOLOGY

## 2023-05-31 PROCEDURE — C1788 PORT, INDWELLING, IMP: HCPCS

## 2023-05-31 PROCEDURE — 77001 FLUOROGUIDE FOR VEIN DEVICE: CPT

## 2023-05-31 PROCEDURE — 6360000002 HC RX W HCPCS: Performed by: RADIOLOGY

## 2023-05-31 PROCEDURE — 76937 US GUIDE VASCULAR ACCESS: CPT

## 2023-05-31 PROCEDURE — 99153 MOD SED SAME PHYS/QHP EA: CPT

## 2023-05-31 PROCEDURE — 99152 MOD SED SAME PHYS/QHP 5/>YRS: CPT

## 2023-05-31 PROCEDURE — 85610 PROTHROMBIN TIME: CPT

## 2023-05-31 PROCEDURE — 36561 INSERT TUNNELED CV CATH: CPT

## 2023-05-31 RX ORDER — SODIUM CHLORIDE 9 MG/ML
INJECTION, SOLUTION INTRAVENOUS PRN
Status: DISCONTINUED | OUTPATIENT
Start: 2023-05-31 | End: 2023-06-03 | Stop reason: HOSPADM

## 2023-05-31 RX ORDER — HEPARIN SODIUM (PORCINE) LOCK FLUSH IV SOLN 100 UNIT/ML 100 UNIT/ML
SOLUTION INTRAVENOUS PRN
Status: COMPLETED | OUTPATIENT
Start: 2023-05-31 | End: 2023-05-31

## 2023-05-31 RX ORDER — SODIUM CHLORIDE 0.9 % (FLUSH) 0.9 %
5-40 SYRINGE (ML) INJECTION EVERY 12 HOURS SCHEDULED
Status: DISCONTINUED | OUTPATIENT
Start: 2023-05-31 | End: 2023-06-03 | Stop reason: HOSPADM

## 2023-05-31 RX ORDER — FENTANYL CITRATE 50 UG/ML
INJECTION, SOLUTION INTRAMUSCULAR; INTRAVENOUS PRN
Status: COMPLETED | OUTPATIENT
Start: 2023-05-31 | End: 2023-05-31

## 2023-05-31 RX ORDER — SODIUM CHLORIDE 0.9 % (FLUSH) 0.9 %
5-40 SYRINGE (ML) INJECTION PRN
Status: DISCONTINUED | OUTPATIENT
Start: 2023-05-31 | End: 2023-06-03 | Stop reason: HOSPADM

## 2023-05-31 RX ORDER — MIDAZOLAM HYDROCHLORIDE 1 MG/ML
INJECTION INTRAMUSCULAR; INTRAVENOUS PRN
Status: COMPLETED | OUTPATIENT
Start: 2023-05-31 | End: 2023-05-31

## 2023-05-31 RX ORDER — ACETAMINOPHEN 325 MG/1
650 TABLET ORAL EVERY 4 HOURS PRN
Status: DISCONTINUED | OUTPATIENT
Start: 2023-05-31 | End: 2023-06-03 | Stop reason: HOSPADM

## 2023-05-31 RX ADMIN — CEFAZOLIN 2000 MG: 10 INJECTION, POWDER, FOR SOLUTION INTRAVENOUS at 13:35

## 2023-05-31 RX ADMIN — FENTANYL CITRATE 50 MCG: 50 INJECTION INTRAMUSCULAR; INTRAVENOUS at 13:52

## 2023-05-31 RX ADMIN — MIDAZOLAM 1 MG: 1 INJECTION INTRAMUSCULAR; INTRAVENOUS at 13:52

## 2023-05-31 RX ADMIN — HEPARIN 500 UNITS: 100 SYRINGE at 14:15

## 2023-05-31 RX ADMIN — FENTANYL CITRATE 25 MCG: 50 INJECTION INTRAMUSCULAR; INTRAVENOUS at 14:07

## 2023-05-31 RX ADMIN — MIDAZOLAM 0.5 MG: 1 INJECTION INTRAMUSCULAR; INTRAVENOUS at 14:07

## 2023-05-31 RX ADMIN — SODIUM CHLORIDE: 9 INJECTION, SOLUTION INTRAVENOUS at 12:41

## 2023-05-31 ASSESSMENT — PAIN - FUNCTIONAL ASSESSMENT: PAIN_FUNCTIONAL_ASSESSMENT: 0-10

## 2023-05-31 NOTE — BRIEF OP NOTE
Brief Postoperative Note    Elaine Gonzalez  YOB: 1960  [id number]    Pre-operative Diagnosis: Cervical cancer,need for chemoTx access    Post-operative Diagnosis: Same    Procedure: Port placement    Anesthesia: Moderate sedation    Surgeons/Assistants: Ray Meléndez MD     Estimated Blood Loss: minimal    Complications: none immediate    Specimens: were not obtained    Findings: U/S and fluoro guided placement of a port via the right IJ. Port was locked with heparin and is ready for use.      Electronically signed by Ray Meléndez MD on 5/31/2023 at 2:33 PM

## 2023-05-31 NOTE — DISCHARGE INSTRUCTIONS
Watch for signs and symptoms of infection including fever, chills, headache, vomiting. Watch for increased redness, swelling or drainage at the site. Watch for any increased weakness or numbness. May shower in 2 days. Soap and water only. No submerging under water. May resume normal diet and activity. No heavy lifting for 24 hours. Call doctor for any complications from procedure.

## 2023-05-31 NOTE — POST SEDATION
Sedation Post Procedure Note    Patient Name: Juanita Gonzalez   YOB: 1960  Room/Bed: Room/bed info not found  Medical Record Number: [id number]  Date: 5/31/2023   Time: 2:32 PM         Physicians/Assistants: Leonie Friend MD, MD    Procedure Performed:  Port placement    Post-Sedation Vital Signs:  Vitals:    05/31/23 1425   BP: 122/68   Pulse: (!) 102   Resp: 16   Temp:    SpO2: 96%      Vital signs were reviewed and were stable after the procedure (see flow sheet for vitals)            Post-Sedation Exam: Responding appropriately, breathing spontaneously           Complications: none    Electronically signed by Leonie Friend MD on 5/31/2023 at 2:32 PM

## 2023-05-31 NOTE — H&P
Interval H&P Note    Pt Name: Yoly Mike  MRN: [id number]  YOB: 1960  Date of evaluation: 5/31/2023      [x] I have reviewed oncology progress note attached below in  the electronic record by dr Flory Arellano dated 5/19/23 for an Interval History and Physical note. [x] I have examined  Leah BARTON Nail  There are no changes to the patient who is scheduled for port placement  by dr Lupillo Armstrong Malignant neoplasm of cervix, unspecified site [facility]). The patient denies new health changes, fever, chills, wheezing, cough, increased SOB, chest pain, open sores or wounds. Vital signs: /84   Pulse (!) 110   Temp 97.7 °F (36.5 °C) (Temporal)   Resp 18   Ht 5' 2\" (1.575 m)   Wt 214 lb (97.1 kg)   SpO2 95%   BMI 39.14 kg/m²     The pt is not taking any anticoagulation medications  The pt does not have DM    Allergies:  Atorvastatin    Medications:    Prior to Admission medications    Medication Sig Start Date End Date Taking?  Authorizing Provider   prochlorperazine (COMPAZINE) 10 MG tablet Take 1 tablet by mouth every 6 hours as needed (nausea) 5/30/23   Israel Hatchet, MD   ondansetron (ZOFRAN-ODT) 4 MG disintegrating tablet Take 1 tablet by mouth every 8 hours as needed for Nausea or Vomiting 5/30/23 6/29/23  Israel Hatchet, MD   BLACK CURRANT SEED OIL PO Take by mouth 1 teaspoon daily    Historical Provider, MD   hydrOXYzine pamoate (VISTARIL) 25 MG capsule Take 1 capsule by mouth 3 times daily as needed for Anxiety  Patient taking differently: Take 1 capsule by mouth 3 times daily as needed for Anxiety Taking at bedtime as needed for sleep 5/4/23   Ariana Age, DO   lisinopril (PRINIVIL;ZESTRIL) 20 MG tablet Take 1 tablet by mouth daily 5/1/23   Ariana Age, DO   rosuvastatin (CRESTOR) 5 MG tablet Take 1 tablet by mouth nightly 5/1/23   Ariana Age, DO   Multiple Vitamins-Minerals (MULTIVITAMIN ADULTS 50+) TABS Take by mouth    Historical Provider, MD         This is a 65 y.o. female who

## 2023-06-02 ENCOUNTER — TELEPHONE (OUTPATIENT)
Dept: ONCOLOGY | Age: 63
End: 2023-06-02

## 2023-06-02 NOTE — TELEPHONE ENCOUNTER
Name: Marily Machado  : 1960  MRN: [id number]    Oncology Navigation Follow-Up Note    Contact Type:  Telephone    Notes: Upon review of chart noted  port placement completed. Noted pt scheduled  for Dr. Kenny Claudio f/u & C1 concurrent chemo/XRT. \"Who to Call When\" document mailed to pt. Will continue to follow.     Electronically signed by Mel Boudreaux RN on 2023 at 3:26 PM

## 2023-06-06 NOTE — TELEPHONE ENCOUNTER
Chemotherapy orders received:     Ht=65 inches  Jd=271 lbs  BSA=2.13  Chemotherapy doses verified:   Cisplatin  40 mg/m2 = 85 mg // SL    ADDENDUM:  Capped at 70mg per pharm for dx Cervical Cancer // SL

## 2023-06-07 ENCOUNTER — HOSPITAL ENCOUNTER (OUTPATIENT)
Dept: RADIATION ONCOLOGY | Age: 63
Discharge: HOME | End: 2023-06-07
Payer: COMMERCIAL

## 2023-06-07 ENCOUNTER — TELEPHONE (OUTPATIENT)
Dept: GYNECOLOGIC ONCOLOGY | Age: 63
End: 2023-06-07

## 2023-06-07 PROCEDURE — 77301 RADIOTHERAPY DOSE PLAN IMRT: CPT | Performed by: RADIOLOGY

## 2023-06-07 PROCEDURE — 77338 DESIGN MLC DEVICE FOR IMRT: CPT | Performed by: RADIOLOGY

## 2023-06-07 PROCEDURE — 77300 RADIATION THERAPY DOSE PLAN: CPT | Performed by: RADIOLOGY

## 2023-06-07 NOTE — TELEPHONE ENCOUNTER
Left message for patient to return call to schedule follow up appt. Can be scheduled July 12 or 13 with .

## 2023-06-08 ENCOUNTER — CLINICAL DOCUMENTATION (OUTPATIENT)
Facility: HOSPITAL | Age: 63
End: 2023-06-08

## 2023-06-08 NOTE — PROGRESS NOTES
Patient Assistance    Met with: Leah Gonzalez  Pt returned call regarding copay. She had very low copay that was met already.                Additional notes: n/a

## 2023-06-12 DIAGNOSIS — C53.9 MALIGNANT NEOPLASM OF CERVIX, UNSPECIFIED SITE (HCC): Primary | ICD-10-CM

## 2023-06-14 ENCOUNTER — HOSPITAL ENCOUNTER (OUTPATIENT)
Dept: INFUSION THERAPY | Age: 63
Discharge: HOME | End: 2023-06-14
Payer: COMMERCIAL

## 2023-06-14 ENCOUNTER — HOSPITAL ENCOUNTER (OUTPATIENT)
Age: 63
Discharge: HOME | End: 2023-06-14
Payer: COMMERCIAL

## 2023-06-14 DIAGNOSIS — C53.9 MALIGNANT NEOPLASM OF CERVIX, UNSPECIFIED SITE (HCC): Primary | ICD-10-CM

## 2023-06-14 DIAGNOSIS — C53.9 MALIGNANT NEOPLASM OF CERVIX, UNSPECIFIED SITE (HCC): ICD-10-CM

## 2023-06-14 LAB
ALBUMIN SERPL-MCNC: 3.9 G/DL (ref 3.5–5.2)
ALBUMIN/GLOB SERPL: 1.1 {RATIO} (ref 1–2.5)
ALP SERPL-CCNC: 103 U/L (ref 35–104)
ALT SERPL-CCNC: 15 U/L (ref 5–33)
ANION GAP SERPL CALCULATED.3IONS-SCNC: 10 MMOL/L (ref 9–17)
AST SERPL-CCNC: 32 U/L
BASOPHILS # BLD: 0.1 K/UL (ref 0–0.2)
BASOPHILS NFR BLD: 1 % (ref 0–2)
BILIRUB SERPL-MCNC: 0.3 MG/DL (ref 0.3–1.2)
BUN SERPL-MCNC: 12 MG/DL (ref 8–23)
CALCIUM SERPL-MCNC: 10.5 MG/DL (ref 8.6–10.4)
CHLORIDE SERPL-SCNC: 96 MMOL/L (ref 98–107)
CO2 SERPL-SCNC: 24 MMOL/L (ref 20–31)
CREAT SERPL-MCNC: 0.79 MG/DL (ref 0.5–0.9)
EOSINOPHIL # BLD: 0.2 K/UL (ref 0–0.4)
EOSINOPHILS RELATIVE PERCENT: 2 % (ref 1–4)
ERYTHROCYTE [DISTWIDTH] IN BLOOD BY AUTOMATED COUNT: 13.8 % (ref 12.5–15.4)
GFR SERPL CREATININE-BSD FRML MDRD: >60 ML/MIN/1.73M2
GLUCOSE SERPL-MCNC: 147 MG/DL (ref 70–99)
HCT VFR BLD AUTO: 33.2 % (ref 36–46)
HGB BLD-MCNC: 11.2 G/DL (ref 12–16)
LYMPHOCYTES # BLD: 14 % (ref 24–44)
LYMPHOCYTES NFR BLD: 1.4 K/UL (ref 1–4.8)
MAGNESIUM SERPL-MCNC: 1.4 MG/DL (ref 1.6–2.6)
MCH RBC QN AUTO: 30.3 PG (ref 26–34)
MCHC RBC AUTO-ENTMCNC: 33.8 G/DL (ref 31–37)
MCV RBC AUTO: 89.6 FL (ref 80–100)
MONOCYTES NFR BLD: 1 K/UL (ref 0.1–1.2)
MONOCYTES NFR BLD: 10 % (ref 2–11)
NEUTROPHILS NFR BLD: 73 % (ref 36–66)
NEUTS SEG NFR BLD: 7.5 K/UL (ref 1.8–7.7)
PLATELET # BLD AUTO: 171 K/UL (ref 140–450)
PMV BLD AUTO: 8.7 FL (ref 6–12)
POTASSIUM SERPL-SCNC: 5 MMOL/L (ref 3.7–5.3)
PROT SERPL-MCNC: 7.5 G/DL (ref 6.4–8.3)
RBC # BLD AUTO: 3.7 M/UL (ref 4–5.2)
SEND OUT REPORT: NORMAL
SODIUM SERPL-SCNC: 130 MMOL/L (ref 135–144)
TEST NAME: NORMAL
WBC OTHER # BLD: 10.2 K/UL (ref 3.5–11)

## 2023-06-14 PROCEDURE — 96361 HYDRATE IV INFUSION ADD-ON: CPT

## 2023-06-14 PROCEDURE — 6360000002 HC RX W HCPCS: Performed by: INTERNAL MEDICINE

## 2023-06-14 PROCEDURE — 96368 THER/DIAG CONCURRENT INF: CPT

## 2023-06-14 PROCEDURE — 96375 TX/PRO/DX INJ NEW DRUG ADDON: CPT

## 2023-06-14 PROCEDURE — 36415 COLL VENOUS BLD VENIPUNCTURE: CPT

## 2023-06-14 PROCEDURE — 96413 CHEMO IV INFUSION 1 HR: CPT

## 2023-06-14 PROCEDURE — 2580000003 HC RX 258: Performed by: INTERNAL MEDICINE

## 2023-06-14 PROCEDURE — 96417 CHEMO IV INFUS EACH ADDL SEQ: CPT

## 2023-06-14 PROCEDURE — 36591 DRAW BLOOD OFF VENOUS DEVICE: CPT

## 2023-06-14 PROCEDURE — 83735 ASSAY OF MAGNESIUM: CPT

## 2023-06-14 PROCEDURE — 96367 TX/PROPH/DG ADDL SEQ IV INF: CPT

## 2023-06-14 PROCEDURE — 96366 THER/PROPH/DIAG IV INF ADDON: CPT

## 2023-06-14 PROCEDURE — 80053 COMPREHEN METABOLIC PANEL: CPT

## 2023-06-14 PROCEDURE — 85027 COMPLETE CBC AUTOMATED: CPT

## 2023-06-14 PROCEDURE — 96374 THER/PROPH/DIAG INJ IV PUSH: CPT

## 2023-06-14 RX ORDER — HEPARIN SODIUM 100 [USP'U]/ML
500 INJECTION, SOLUTION INTRAVENOUS PRN
Status: DISCONTINUED | OUTPATIENT
Start: 2023-06-14 | End: 2023-06-15 | Stop reason: HOSPADM

## 2023-06-14 RX ORDER — PALONOSETRON 0.05 MG/ML
0.25 INJECTION, SOLUTION INTRAVENOUS ONCE
Status: COMPLETED | OUTPATIENT
Start: 2023-06-14 | End: 2023-06-14

## 2023-06-14 RX ORDER — SODIUM CHLORIDE AND POTASSIUM CHLORIDE 150; 900 MG/100ML; MG/100ML
INJECTION, SOLUTION INTRAVENOUS ONCE
Status: COMPLETED | OUTPATIENT
Start: 2023-06-14 | End: 2023-06-14

## 2023-06-14 RX ORDER — MAGNESIUM SULFATE 1 G/100ML
1000 INJECTION INTRAVENOUS ONCE
Status: COMPLETED | OUTPATIENT
Start: 2023-06-14 | End: 2023-06-14

## 2023-06-14 RX ORDER — MAGNESIUM SULFATE IN WATER 40 MG/ML
2000 INJECTION, SOLUTION INTRAVENOUS ONCE
Status: COMPLETED | OUTPATIENT
Start: 2023-06-14 | End: 2023-06-14

## 2023-06-14 RX ORDER — DEXAMETHASONE SODIUM PHOSPHATE 10 MG/ML
10 INJECTION INTRAMUSCULAR; INTRAVENOUS ONCE
Status: COMPLETED | OUTPATIENT
Start: 2023-06-14 | End: 2023-06-14

## 2023-06-14 RX ORDER — SODIUM CHLORIDE 0.9 % (FLUSH) 0.9 %
5-40 SYRINGE (ML) INJECTION PRN
Status: DISCONTINUED | OUTPATIENT
Start: 2023-06-14 | End: 2023-06-15 | Stop reason: HOSPADM

## 2023-06-14 RX ORDER — SODIUM CHLORIDE 9 MG/ML
5-250 INJECTION, SOLUTION INTRAVENOUS PRN
Status: DISCONTINUED | OUTPATIENT
Start: 2023-06-14 | End: 2023-06-15 | Stop reason: HOSPADM

## 2023-06-14 RX ADMIN — PALONOSETRON 0.25 MG: 0.05 INJECTION, SOLUTION INTRAVENOUS at 12:15

## 2023-06-14 RX ADMIN — DEXAMETHASONE SODIUM PHOSPHATE 10 MG: 10 INJECTION INTRAMUSCULAR; INTRAVENOUS at 12:18

## 2023-06-14 RX ADMIN — MAGNESIUM SULFATE HEPTAHYDRATE 2000 MG: 40 INJECTION, SOLUTION INTRAVENOUS at 13:30

## 2023-06-14 RX ADMIN — SODIUM CHLORIDE 115 ML/HR: 9 INJECTION, SOLUTION INTRAVENOUS at 10:58

## 2023-06-14 RX ADMIN — SODIUM CHLORIDE, PRESERVATIVE FREE 10 ML: 5 INJECTION INTRAVENOUS at 10:50

## 2023-06-14 RX ADMIN — POTASSIUM CHLORIDE AND SODIUM CHLORIDE: 900; 150 INJECTION, SOLUTION INTRAVENOUS at 10:56

## 2023-06-14 RX ADMIN — FOSAPREPITANT 150 MG: 150 INJECTION, POWDER, LYOPHILIZED, FOR SOLUTION INTRAVENOUS at 12:39

## 2023-06-14 RX ADMIN — MAGNESIUM SULFATE HEPTAHYDRATE 1000 MG: 1 INJECTION, SOLUTION INTRAVENOUS at 15:31

## 2023-06-14 RX ADMIN — HEPARIN 500 UNITS: 100 SYRINGE at 16:34

## 2023-06-14 RX ADMIN — SODIUM CHLORIDE, PRESERVATIVE FREE 10 ML: 5 INJECTION INTRAVENOUS at 16:33

## 2023-06-14 RX ADMIN — MAGNESIUM SULFATE HEPTAHYDRATE 1000 MG: 1 INJECTION, SOLUTION INTRAVENOUS at 10:59

## 2023-06-14 RX ADMIN — CISPLATIN 70 MG: 1 INJECTION INTRAVENOUS at 13:26

## 2023-06-14 NOTE — PROGRESS NOTES
Pt admitted for C1D1 Cisplatin. Pt denies pain. Port accessed and labs drawn. Labs reviewed and within treatable limits. Ok to proceed with therapy. Pt tolerated treatment without problem. Pt stable at discharge. Next visit 6/21/23 for C1D8 Cisplatin and MD visit.
bloody stools

## 2023-06-19 ENCOUNTER — HOSPITAL ENCOUNTER (OUTPATIENT)
Dept: RADIATION ONCOLOGY | Age: 63
Discharge: HOME | End: 2023-06-19
Payer: COMMERCIAL

## 2023-06-19 PROCEDURE — 77386 HC NTSTY MODUL RAD TX DLVR CPLX: CPT | Performed by: RADIOLOGY

## 2023-06-20 ENCOUNTER — HOSPITAL ENCOUNTER (OUTPATIENT)
Dept: RADIATION ONCOLOGY | Age: 63
Discharge: HOME | End: 2023-06-20
Payer: COMMERCIAL

## 2023-06-20 PROCEDURE — 77386 HC NTSTY MODUL RAD TX DLVR CPLX: CPT | Performed by: RADIOLOGY

## 2023-06-21 ENCOUNTER — HOSPITAL ENCOUNTER (OUTPATIENT)
Dept: RADIATION ONCOLOGY | Age: 63
Discharge: HOME | End: 2023-06-21
Payer: COMMERCIAL

## 2023-06-21 ENCOUNTER — TELEPHONE (OUTPATIENT)
Dept: ONCOLOGY | Age: 63
End: 2023-06-21

## 2023-06-21 ENCOUNTER — HOSPITAL ENCOUNTER (OUTPATIENT)
Dept: INFUSION THERAPY | Age: 63
Discharge: HOME | End: 2023-06-21
Payer: COMMERCIAL

## 2023-06-21 ENCOUNTER — OFFICE VISIT (OUTPATIENT)
Dept: ONCOLOGY | Age: 63
End: 2023-06-21
Payer: COMMERCIAL

## 2023-06-21 VITALS
RESPIRATION RATE: 16 BRPM | HEART RATE: 96 BPM | WEIGHT: 211.2 LBS | DIASTOLIC BLOOD PRESSURE: 83 MMHG | SYSTOLIC BLOOD PRESSURE: 135 MMHG | BODY MASS INDEX: 38.63 KG/M2 | TEMPERATURE: 98.1 F

## 2023-06-21 VITALS
DIASTOLIC BLOOD PRESSURE: 83 MMHG | WEIGHT: 211.2 LBS | RESPIRATION RATE: 16 BRPM | TEMPERATURE: 98.1 F | HEART RATE: 96 BPM | SYSTOLIC BLOOD PRESSURE: 135 MMHG | BODY MASS INDEX: 38.63 KG/M2

## 2023-06-21 DIAGNOSIS — C53.9 MALIGNANT NEOPLASM OF CERVIX, UNSPECIFIED SITE (HCC): Primary | ICD-10-CM

## 2023-06-21 DIAGNOSIS — E83.52 HYPERCALCEMIA: ICD-10-CM

## 2023-06-21 DIAGNOSIS — Z51.11 CHEMOTHERAPY MANAGEMENT, ENCOUNTER FOR: ICD-10-CM

## 2023-06-21 LAB
ALBUMIN SERPL-MCNC: 4.1 G/DL (ref 3.5–5.2)
ALBUMIN/GLOB SERPL: 1.2 {RATIO} (ref 1–2.5)
ALP SERPL-CCNC: 100 U/L (ref 35–104)
ALT SERPL-CCNC: 15 U/L (ref 5–33)
ANION GAP SERPL CALCULATED.3IONS-SCNC: 12 MMOL/L (ref 9–17)
AST SERPL-CCNC: 23 U/L
BASOPHILS # BLD: 0 K/UL (ref 0–0.2)
BASOPHILS NFR BLD: 1 % (ref 0–2)
BILIRUB SERPL-MCNC: 0.3 MG/DL (ref 0.3–1.2)
BUN SERPL-MCNC: 10 MG/DL (ref 8–23)
CALCIUM SERPL-MCNC: 9.2 MG/DL (ref 8.6–10.4)
CHLORIDE SERPL-SCNC: 96 MMOL/L (ref 98–107)
CO2 SERPL-SCNC: 25 MMOL/L (ref 20–31)
CREAT SERPL-MCNC: 0.73 MG/DL (ref 0.5–0.9)
EOSINOPHIL # BLD: 0.1 K/UL (ref 0–0.4)
EOSINOPHILS RELATIVE PERCENT: 2 % (ref 1–4)
ERYTHROCYTE [DISTWIDTH] IN BLOOD BY AUTOMATED COUNT: 13.3 % (ref 12.5–15.4)
GFR SERPL CREATININE-BSD FRML MDRD: >60 ML/MIN/1.73M2
GLUCOSE SERPL-MCNC: 187 MG/DL (ref 70–99)
HCT VFR BLD AUTO: 32.2 % (ref 36–46)
HGB BLD-MCNC: 11 G/DL (ref 12–16)
LYMPHOCYTES # BLD: 11 % (ref 24–44)
LYMPHOCYTES NFR BLD: 0.8 K/UL (ref 1–4.8)
MAGNESIUM SERPL-MCNC: 1.3 MG/DL (ref 1.6–2.6)
MCH RBC QN AUTO: 30.3 PG (ref 26–34)
MCHC RBC AUTO-ENTMCNC: 34.3 G/DL (ref 31–37)
MCV RBC AUTO: 88.4 FL (ref 80–100)
MONOCYTES NFR BLD: 0.5 K/UL (ref 0.1–1.2)
MONOCYTES NFR BLD: 8 % (ref 2–11)
NEUTROPHILS NFR BLD: 78 % (ref 36–66)
NEUTS SEG NFR BLD: 5.4 K/UL (ref 1.8–7.7)
PLATELET # BLD AUTO: 227 K/UL (ref 140–450)
PMV BLD AUTO: 8.3 FL (ref 6–12)
POTASSIUM SERPL-SCNC: 3.9 MMOL/L (ref 3.7–5.3)
PROT SERPL-MCNC: 7.5 G/DL (ref 6.4–8.3)
RBC # BLD AUTO: 3.64 M/UL (ref 4–5.2)
SODIUM SERPL-SCNC: 133 MMOL/L (ref 135–144)
WBC OTHER # BLD: 6.9 K/UL (ref 3.5–11)

## 2023-06-21 PROCEDURE — 36591 DRAW BLOOD OFF VENOUS DEVICE: CPT

## 2023-06-21 PROCEDURE — 6360000002 HC RX W HCPCS: Performed by: INTERNAL MEDICINE

## 2023-06-21 PROCEDURE — 77336 RADIATION PHYSICS CONSULT: CPT | Performed by: RADIOLOGY

## 2023-06-21 PROCEDURE — 96360 HYDRATION IV INFUSION INIT: CPT

## 2023-06-21 PROCEDURE — 85027 COMPLETE CBC AUTOMATED: CPT

## 2023-06-21 PROCEDURE — 36415 COLL VENOUS BLD VENIPUNCTURE: CPT

## 2023-06-21 PROCEDURE — 83735 ASSAY OF MAGNESIUM: CPT

## 2023-06-21 PROCEDURE — 2580000003 HC RX 258: Performed by: INTERNAL MEDICINE

## 2023-06-21 PROCEDURE — 96417 CHEMO IV INFUS EACH ADDL SEQ: CPT

## 2023-06-21 PROCEDURE — 96375 TX/PRO/DX INJ NEW DRUG ADDON: CPT

## 2023-06-21 PROCEDURE — 99214 OFFICE O/P EST MOD 30 MIN: CPT | Performed by: INTERNAL MEDICINE

## 2023-06-21 PROCEDURE — 3075F SYST BP GE 130 - 139MM HG: CPT | Performed by: INTERNAL MEDICINE

## 2023-06-21 PROCEDURE — 80053 COMPREHEN METABOLIC PANEL: CPT

## 2023-06-21 PROCEDURE — 96368 THER/DIAG CONCURRENT INF: CPT

## 2023-06-21 PROCEDURE — 77386 HC NTSTY MODUL RAD TX DLVR CPLX: CPT | Performed by: RADIOLOGY

## 2023-06-21 PROCEDURE — 96361 HYDRATE IV INFUSION ADD-ON: CPT

## 2023-06-21 PROCEDURE — 96367 TX/PROPH/DG ADDL SEQ IV INF: CPT

## 2023-06-21 PROCEDURE — 3079F DIAST BP 80-89 MM HG: CPT | Performed by: INTERNAL MEDICINE

## 2023-06-21 PROCEDURE — 96413 CHEMO IV INFUSION 1 HR: CPT

## 2023-06-21 RX ORDER — PALONOSETRON 0.05 MG/ML
0.25 INJECTION, SOLUTION INTRAVENOUS ONCE
Status: COMPLETED | OUTPATIENT
Start: 2023-06-21 | End: 2023-06-21

## 2023-06-21 RX ORDER — SODIUM CHLORIDE 0.9 % (FLUSH) 0.9 %
5-40 SYRINGE (ML) INJECTION PRN
Status: DISCONTINUED | OUTPATIENT
Start: 2023-06-21 | End: 2023-06-22 | Stop reason: HOSPADM

## 2023-06-21 RX ORDER — SODIUM CHLORIDE AND POTASSIUM CHLORIDE 150; 900 MG/100ML; MG/100ML
INJECTION, SOLUTION INTRAVENOUS ONCE
Status: COMPLETED | OUTPATIENT
Start: 2023-06-21 | End: 2023-06-21

## 2023-06-21 RX ORDER — MAGNESIUM SULFATE IN WATER 40 MG/ML
2000 INJECTION, SOLUTION INTRAVENOUS ONCE
Status: COMPLETED | OUTPATIENT
Start: 2023-06-21 | End: 2023-06-21

## 2023-06-21 RX ORDER — MAGNESIUM OXIDE 400 MG/1
400 TABLET ORAL DAILY
Qty: 30 TABLET | Refills: 1 | Status: SHIPPED | OUTPATIENT
Start: 2023-06-21

## 2023-06-21 RX ORDER — DEXAMETHASONE SODIUM PHOSPHATE 10 MG/ML
10 INJECTION INTRAMUSCULAR; INTRAVENOUS ONCE
Status: COMPLETED | OUTPATIENT
Start: 2023-06-21 | End: 2023-06-21

## 2023-06-21 RX ORDER — HEPARIN SODIUM 100 [USP'U]/ML
500 INJECTION, SOLUTION INTRAVENOUS PRN
Status: DISCONTINUED | OUTPATIENT
Start: 2023-06-21 | End: 2023-06-22 | Stop reason: HOSPADM

## 2023-06-21 RX ORDER — SODIUM CHLORIDE 9 MG/ML
5-250 INJECTION, SOLUTION INTRAVENOUS PRN
Status: DISCONTINUED | OUTPATIENT
Start: 2023-06-21 | End: 2023-06-22 | Stop reason: HOSPADM

## 2023-06-21 RX ORDER — MAGNESIUM SULFATE 1 G/100ML
1000 INJECTION INTRAVENOUS ONCE
Status: COMPLETED | OUTPATIENT
Start: 2023-06-21 | End: 2023-06-21

## 2023-06-21 RX ADMIN — PALONOSETRON 0.25 MG: 0.05 INJECTION, SOLUTION INTRAVENOUS at 12:47

## 2023-06-21 RX ADMIN — MAGNESIUM SULFATE HEPTAHYDRATE 2000 MG: 40 INJECTION, SOLUTION INTRAVENOUS at 14:40

## 2023-06-21 RX ADMIN — FOSAPREPITANT 150 MG: 150 INJECTION, POWDER, LYOPHILIZED, FOR SOLUTION INTRAVENOUS at 13:01

## 2023-06-21 RX ADMIN — SODIUM CHLORIDE, PRESERVATIVE FREE 10 ML: 5 INJECTION INTRAVENOUS at 16:58

## 2023-06-21 RX ADMIN — SODIUM CHLORIDE 115 ML/HR: 9 INJECTION, SOLUTION INTRAVENOUS at 11:35

## 2023-06-21 RX ADMIN — POTASSIUM CHLORIDE AND SODIUM CHLORIDE: 900; 150 INJECTION, SOLUTION INTRAVENOUS at 11:36

## 2023-06-21 RX ADMIN — DEXAMETHASONE SODIUM PHOSPHATE 10 MG: 10 INJECTION INTRAMUSCULAR; INTRAVENOUS at 12:48

## 2023-06-21 RX ADMIN — CISPLATIN 70 MG: 1 INJECTION INTRAVENOUS at 13:29

## 2023-06-21 RX ADMIN — HEPARIN 500 UNITS: 100 SYRINGE at 16:58

## 2023-06-21 RX ADMIN — SODIUM CHLORIDE, PRESERVATIVE FREE 10 ML: 5 INJECTION INTRAVENOUS at 11:21

## 2023-06-21 RX ADMIN — MAGNESIUM SULFATE HEPTAHYDRATE 1000 MG: 1 INJECTION, SOLUTION INTRAVENOUS at 11:35

## 2023-06-21 RX ADMIN — MAGNESIUM SULFATE HEPTAHYDRATE 1000 MG: 1 INJECTION, SOLUTION INTRAVENOUS at 13:31

## 2023-06-21 ASSESSMENT — PAIN SCALES - GENERAL: PAINLEVEL_OUTOF10: 0

## 2023-06-21 NOTE — PROGRESS NOTES
Pt here for C.1D.8. cisplatin. Arrives ambulatory. Denies any new complaints. Labs drawn from port, results reviewed. Magnesium 1.3, replaced per protocol. Pt was seen by Dr. Ben Coy, order rec'd to proceed with tx. Tx complete without incident. Pt d/c'd in stable condition. Returns 6/28/2023 for C1D15.

## 2023-06-21 NOTE — PROGRESS NOTES
Leah BARTON Nail  6/21/2023  Wt Readings from Last 3 Encounters:   06/21/23 211 lb 3.2 oz (95.8 kg)   06/21/23 211 lb 3.2 oz (95.8 kg)   06/14/23 216 lb 12.8 oz (98.3 kg)     Body mass index is 38.63 kg/m². Treatment Area:  Pelvis    Comfort Alteration    Fatigue: Mild    Mucous Membrane Alteration  Drainage: Yes  Drainage Odor: No  Vaginal Bleeding: No (stopped yesterday)     Nutritional Alteration  Anorexia: No  Nausea: No  Vomiting: No     Elimination Alterations  Constipation: no  Diarrhea:  occasional in the morning  Urinary Frequency/Urgency: No  Urinary Retention: No  Dysuria: No  Urinary Incontinence: No  Proctitis: No    Skin Alteration   Sensation: wnl    Radiation Dermatitis:  Intact [x]     Erythema  []     Discoloration  []     Rash []     Dry desquamation  []     Moist desquamation []       Emotional  Coping: effective      Injury, potential bleeding or infection: wnl    Lab Results   Component Value Date    WBC 6.9 06/21/2023     06/21/2023         /83   Pulse 96   Temp 98.1 °F (36.7 °C) (Oral)   Resp 16   Wt 211 lb 3.2 oz (95.8 kg)   BMI 38.63 kg/m²         Pain Level: 0           Assessment/Plan: Patient was seen today for weekly visit. Pt states occasional nausea and diarrhea. States smoking marijuana helps the nausea. Does not feel the need to take Imodium. Vaginal bleeding stopped as of yesterday. Otherwise continue current plan of care.   Anaya Jones RN

## 2023-06-21 NOTE — TELEPHONE ENCOUNTER
Pediatric Well Adolescent Exam: 13-15 Years of age    Chief Complaint   Patient presents with   • Office Visit   • Well Child     16 yrs  phy       SUBJECTIVE:  Anastasiia Panchal is a 16 year old female who presents for a well child exam.  Patient presents  with Mother who stayed for the physical exam and part of the history    CONCERNS RAISED TODAY: acne.  She washes her face with a dove soap bar.  She didn't see improvement on epiduo, so she stopped it.    Current Meds: none    Menstrual History:   Menarche: Fall 2021   LMP: ~6/24/22    Regularity: not yet regular    Concerns: none    Nutrition:   Diet: 2-4 servings of fruit daily.  2-3 servings of vegetables daily. 2-3 servings of protein daily.    Milk: None.  She will eat some cheese.   Water: Sometimes she drinks a lot and sometimes minimal    Juice:  none   Soda: occasional   Supplements: vitamin D in the winter   Voids: Denies dysuria   Stools: Denies constipation    Sleep:   Patient sleeps from 11pm until 6:45am   Snoring: none   Concerns: none    Dental:   Tooth brushing: twice daily   Dental Visits: regular visits      Social History:   Home  Lives with: Mom, Dad, sister  Recent changes at home: new puppy  Education/Activities   Grade in school:  Going to be a sophomore  Academic Performance: straight A's last year  Has Friends: yes  Activities: Soccer, cross country and track  Screen time: 6hr daily  Time physically active: 1hr daily     Sports Physical Questions:   - Chest pain with sports or exercise? no    - Concussions with sports or exercise? no  - Syncope with sports or exercise?  no  - Joint instability or weakness?  no  - Family history of sudden cardiac death? no    Risk Assessment  Eating  []  YES    [x]  NO    []  Unknown    Has concerns about body/appearance?  Drugs  []  YES    [x]  NO    []  Confidential    Uses tobacco, alcohol or drugs?  Sex  []  YES    [x]  NO    []  Confidential   Has had sex?  Interested in: males   Suicide/Mental  AVS from  6/21/23    Tx today   Rv in 1 week       *rv is sched for [email or web address] w /tx @11    PT was given AVS and appt schedule Health  [x]  YES    []  NO    []  Unknown   Has ways to cope with stress?  []  YES    [x]  NO    []  Unknown   Has problems with sleep?  []  YES    [x]  NO    []  Unknown   Gets depressed, anxious or irritable/ has mood swings?  []  YES    [x]  NO    []  Unknown    Has thoughts about hurting self or considering suicide?      OBJECTIVE:  PAST HISTORIES:  Allergies, Medications, Medical history, Surgical history, Family history reviewed and updated.  Toxic Exposure:   Tobacco Use: Never           IMMUNIZATION STATUS: Up to date per review of the electronic health records.    IMMUNIZATION REACTIONS: None    RECENT HEALTH EVENTS:  · Hospitalizations:  []  YES    [x]  NO    []  N/A  · Injuries or Accidents:  []  YES    [x]  NO    []  N/A    Review of Systems   Constitutional: Negative for fever.   HENT: Negative for congestion, ear pain and rhinorrhea.    Respiratory: Negative for cough and shortness of breath.    Cardiovascular: Negative for chest pain and palpitations.   Gastrointestinal: Negative for abdominal pain and constipation.   Genitourinary: Negative for dysuria.   Musculoskeletal: Negative for arthralgias and myalgias.   Skin: Negative for rash.   Neurological: Negative for dizziness and headaches.   Hematological: Does not bruise/bleed easily.       Physical Exam  Vitals reviewed.   Constitutional:       General: She is not in acute distress.     Appearance: Normal appearance. She is well-developed.   HENT:      Head: Normocephalic and atraumatic.      Right Ear: Tympanic membrane and external ear normal.      Left Ear: Tympanic membrane and external ear normal.      Nose: Nose normal.      Mouth/Throat:      Mouth: Mucous membranes are moist.      Pharynx: Oropharynx is clear. No oropharyngeal exudate or posterior oropharyngeal erythema.      Neck: Normal range of motion and neck supple.   Eyes:      General:         Right eye: No discharge.         Left eye: No discharge.      Extraocular Movements:  Extraocular movements intact.      Conjunctiva/sclera: Conjunctivae normal.   Cardiovascular:      Rate and Rhythm: Normal rate and regular rhythm.      Heart sounds: Normal heart sounds.   Pulmonary:      Effort: Pulmonary effort is normal. No respiratory distress.      Breath sounds: Normal breath sounds.   Abdominal:      General: There is no distension.      Palpations: Abdomen is soft. There is no mass.      Tenderness: There is no abdominal tenderness.   Musculoskeletal:         General: Normal range of motion.      Comments: No scoliosis; +duck walk   Skin:     General: Skin is warm and dry.   Neurological:      General: No focal deficit present.      Mental Status: She is alert.      Cranial Nerves: No cranial nerve deficit.      Coordination: Coordination normal.      Deep Tendon Reflexes: Reflexes normal.   Psychiatric:         Mood and Affect: Mood normal.         Visit Vitals  /60 (BP Location: RUE - Right upper extremity, Patient Position: Sitting, Cuff Size: Regular)   Pulse 72   Temp 98.2 °F (36.8 °C) (Tympanic)   Resp 16   Ht 5' 3\" (1.6 m)   Wt 51.4 kg (113 lb 3.3 oz)   BMI 20.05 kg/m²       ASSESSMENT:   Assessment   Problem List Items Addressed This Visit        Skin    Acne vulgaris     Patient has mild acne. Wash face twice daily with benzoyl peroxide face wash. Start topical antibiotic twice daily. Use over the counter differin every other night before bed.           Relevant Medications    erythromycin (EMGEL) 2 % gel      Other Visit Diagnoses     Well adolescent visit without abnormal findings    -  Primary          PLAN:  1. All parental concerns and questions discussed.  2. Anticipatory guidance provided    Follow up:Return in about 1 year (around 7/13/2023) for 17 yr physical.

## 2023-06-21 NOTE — PROGRESS NOTES
107 mmol/L    CO2 25 20 - 31 mmol/L    Anion Gap 12 9 - 17 mmol/L    Alkaline Phosphatase 100 35 - 104 U/L    ALT 15 5 - 33 U/L    AST 23 <32 U/L    Total Bilirubin 0.3 0.3 - 1.2 mg/dL    Total Protein 7.5 6.4 - 8.3 g/dL    Albumin 4.1 3.5 - 5.2 g/dL    Albumin/Globulin Ratio 1.2 1.0 - 2.5   Magnesium   Result Value Ref Range    Magnesium 1.3 (L) 1.6 - 2.6 mg/dL     MRI PELVIS W WO CONTRAST    Result Date: 6/5/2023  EXAMINATION: MRI OF THE PELVIS WITHOUT AND WITH CONTRAST, 5/26/2023 1:21 pm TECHNIQUE: Multiplanar multisequence MRI of the pelvis was performed without and with the administration of intravenous contrast. COMPARISON: PET-CT 05/12/2023 HISTORY: ORDERING SYSTEM PROVIDED HISTORY: Cervical cancer, FIGO stage III ([facility] [address] TECHNOLOGIST PROVIDED HISTORY: STAT Creatinine as needed:->Yes cervical cancer Reason for Exam: cervical cancer, Cervical cancer, FIGO stage III ([address] New DX cancer one month. FINDINGS: There is expansile cervical mass extending into the upper 3rd of the vaginal canal.  Size is approximately 6.2 x 6.4 x 3.5 cm. Mass demonstrates mild to moderate T2 hyperintensity in showing mild restriction of diffusion. Consistent with the known cervical cancer which is FDG avid on the prior PET-CT. Best seen on oblique axial T2 FR FSE series 8 image 17-19 there is involvement of the anterolateral but no wall best seen from about 1-6 o'clock position in the axial plane. No appreciable involvement of the bladder or rectum or other extension out of the vaginal canal. There is a 2.0 x 1.1 cm left iliac chain lymph node close to the bifurcation of the common iliac artery corresponding to FDG avid lymph node on the prior PET-CT. Distal right external iliac 8 mm rounded lymph node noted which was not FDG avid, axial T1 series 4, image 38. No ascites. Right ovary measures about 2.4 x 1.4 cm and demonstrates a dominant 1 cm T2 hyperintense nonenhancing focal.  Right ovary measures 2.0 x 1.5 cm.  Urinary bladder

## 2023-06-21 NOTE — PROGRESS NOTES
[facility]       Radiation Oncology          [address] [facility]          [place] [address]        Kristin Cuencay: [phone]        F: [phone]       [email or web address]             RADIATION ONCOLOGY WEEKLY PROGRESS NOTE  Patient ID:   Ryley Martinez  : 1960   MRN: [id number]    Location:  [facility] Radiation Oncology,   [address] [facility] [place]   [phone]    DIAGNOSIS:   Cancer Staging   Malignant neoplasm of cervix [facility])  Staging form: Cervix Uteri, AJCC Version 9  - Clinical stage from 2023: FIGO Stage IIIC1 (cT1b3, cN1a, cM0) - Signed by Cassidy Duarte MD on 2023        TREATMENT DETAILS:  Treatment Site: Cervix LNs  Actual Dose: 1080cGy  Total Planned Dose: 4500cGy  Treatment Technique: IMRT  Fraction Technique: Daily  Therapy imaging monitoring: CBCT daily  Concurrent Chemotherapy: Carbo/taxol    SUBJECTIVE:   Patient seen for their weekly on treatment evaluation today. She notes her vaginal bleeding has stopped. She denies any symptoms of bladder or bowel irritation. She feels better overall. OBJECTIVE:   CHAPERONE: Family/friend/companieon Present    ECO Asymptomatic    VITAL SIGNS: /83   Pulse 96   Temp 98.1 °F (36.7 °C) (Oral)   Resp 16   Wt 211 lb 3.2 oz (95.8 kg)   BMI 38.63 kg/m²   Wt Readings from Last 5 Encounters:   23 211 lb 3.2 oz (95.8 kg)   23 211 lb 3.2 oz (95.8 kg)   23 216 lb 12.8 oz (98.3 kg)   23 216 lb 12.8 oz (98.3 kg)   23 214 lb (97.1 kg)     GENERAL:  General appearance is that of a well-nourished, well-developed in no apparent distress. ABDOMEN:  Soft, nontender, non distended. EXTREMITIES:  No edema. Normal ROM. NEUROLOGICAL: Alert and oriented. Strength and sensation intact bilaterally. No focal deficits. PSYCH: Mood normal, behavior normal.  SKIN: No erythema, no desquamation.     LABS:  WBC   Date Value Ref Range Status   2023 6.9 3.5 - 11.0 k/uL

## 2023-06-22 ENCOUNTER — HOSPITAL ENCOUNTER (OUTPATIENT)
Dept: RADIATION ONCOLOGY | Age: 63
Discharge: HOME | End: 2023-06-22
Payer: COMMERCIAL

## 2023-06-23 ENCOUNTER — HOSPITAL ENCOUNTER (OUTPATIENT)
Dept: RADIATION ONCOLOGY | Age: 63
Discharge: HOME | End: 2023-06-23
Payer: COMMERCIAL

## 2023-06-26 ENCOUNTER — HOSPITAL ENCOUNTER (OUTPATIENT)
Dept: RADIATION ONCOLOGY | Age: 63
Discharge: HOME | End: 2023-06-26
Payer: COMMERCIAL

## 2023-06-26 PROCEDURE — 77386 HC NTSTY MODUL RAD TX DLVR CPLX: CPT | Performed by: RADIOLOGY

## 2023-06-27 ENCOUNTER — HOSPITAL ENCOUNTER (OUTPATIENT)
Dept: RADIATION ONCOLOGY | Age: 63
Discharge: HOME | End: 2023-06-27
Payer: COMMERCIAL

## 2023-06-27 PROCEDURE — 77386 HC NTSTY MODUL RAD TX DLVR CPLX: CPT | Performed by: RADIOLOGY

## 2023-06-28 ENCOUNTER — HOSPITAL ENCOUNTER (OUTPATIENT)
Dept: INFUSION THERAPY | Age: 63
Discharge: HOME | End: 2023-06-28
Payer: COMMERCIAL

## 2023-06-28 ENCOUNTER — HOSPITAL ENCOUNTER (OUTPATIENT)
Dept: RADIATION ONCOLOGY | Age: 63
Discharge: HOME | End: 2023-06-28
Payer: COMMERCIAL

## 2023-06-28 ENCOUNTER — TELEPHONE (OUTPATIENT)
Dept: ONCOLOGY | Age: 63
End: 2023-06-28

## 2023-06-28 VITALS
SYSTOLIC BLOOD PRESSURE: 154 MMHG | TEMPERATURE: 98.4 F | DIASTOLIC BLOOD PRESSURE: 83 MMHG | WEIGHT: 211.6 LBS | RESPIRATION RATE: 16 BRPM | BODY MASS INDEX: 38.7 KG/M2 | HEART RATE: 97 BPM

## 2023-06-28 VITALS
WEIGHT: 211.6 LBS | BODY MASS INDEX: 38.7 KG/M2 | HEART RATE: 97 BPM | SYSTOLIC BLOOD PRESSURE: 154 MMHG | RESPIRATION RATE: 16 BRPM | DIASTOLIC BLOOD PRESSURE: 83 MMHG | TEMPERATURE: 98.4 F

## 2023-06-28 DIAGNOSIS — C53.9 MALIGNANT NEOPLASM OF CERVIX, UNSPECIFIED SITE (HCC): Primary | ICD-10-CM

## 2023-06-28 LAB
ALBUMIN SERPL-MCNC: 4.1 G/DL (ref 3.5–5.2)
ALBUMIN/GLOB SERPL: 1.3 {RATIO} (ref 1–2.5)
ALP SERPL-CCNC: 100 U/L (ref 35–104)
ALT SERPL-CCNC: 17 U/L (ref 5–33)
ANION GAP SERPL CALCULATED.3IONS-SCNC: 12 MMOL/L (ref 9–17)
AST SERPL-CCNC: 21 U/L
BASOPHILS # BLD: 0 K/UL (ref 0–0.2)
BASOPHILS NFR BLD: 0 % (ref 0–2)
BILIRUB SERPL-MCNC: 0.4 MG/DL (ref 0.3–1.2)
BUN SERPL-MCNC: 11 MG/DL (ref 8–23)
CALCIUM SERPL-MCNC: 8.8 MG/DL (ref 8.6–10.4)
CHLORIDE SERPL-SCNC: 98 MMOL/L (ref 98–107)
CO2 SERPL-SCNC: 24 MMOL/L (ref 20–31)
CREAT SERPL-MCNC: 0.74 MG/DL (ref 0.5–0.9)
EOSINOPHIL # BLD: 0.19 K/UL (ref 0–0.4)
EOSINOPHILS RELATIVE PERCENT: 3 % (ref 1–4)
ERYTHROCYTE [DISTWIDTH] IN BLOOD BY AUTOMATED COUNT: 13.4 % (ref 12.5–15.4)
GFR SERPL CREATININE-BSD FRML MDRD: >60 ML/MIN/1.73M2
GLUCOSE SERPL-MCNC: 154 MG/DL (ref 70–99)
HCT VFR BLD AUTO: 31.5 % (ref 36–46)
HGB BLD-MCNC: 10.9 G/DL (ref 12–16)
LYMPHOCYTES # BLD: 10 % (ref 24–44)
LYMPHOCYTES NFR BLD: 0.64 K/UL (ref 1–4.8)
MAGNESIUM SERPL-MCNC: 1.4 MG/DL (ref 1.6–2.6)
MCH RBC QN AUTO: 30.8 PG (ref 26–34)
MCHC RBC AUTO-ENTMCNC: 34.7 G/DL (ref 31–37)
MCV RBC AUTO: 88.9 FL (ref 80–100)
MONOCYTES NFR BLD: 0.45 K/UL (ref 0.1–0.8)
MONOCYTES NFR BLD: 7 % (ref 1–7)
MORPHOLOGY: NORMAL
NEUTROPHILS NFR BLD: 80 % (ref 36–66)
NEUTS SEG NFR BLD: 5.12 K/UL (ref 1.8–7.7)
PLATELET # BLD AUTO: 104 K/UL (ref 140–450)
PMV BLD AUTO: 8.3 FL (ref 6–12)
POTASSIUM SERPL-SCNC: 3.9 MMOL/L (ref 3.7–5.3)
PROT SERPL-MCNC: 7.2 G/DL (ref 6.4–8.3)
RBC # BLD AUTO: 3.55 M/UL (ref 4–5.2)
SODIUM SERPL-SCNC: 134 MMOL/L (ref 135–144)
WBC OTHER # BLD: 6.4 K/UL (ref 3.5–11)

## 2023-06-28 PROCEDURE — 96375 TX/PRO/DX INJ NEW DRUG ADDON: CPT

## 2023-06-28 PROCEDURE — 77386 HC NTSTY MODUL RAD TX DLVR CPLX: CPT | Performed by: RADIOLOGY

## 2023-06-28 PROCEDURE — 80053 COMPREHEN METABOLIC PANEL: CPT

## 2023-06-28 PROCEDURE — 96413 CHEMO IV INFUSION 1 HR: CPT

## 2023-06-28 PROCEDURE — 6360000002 HC RX W HCPCS: Performed by: INTERNAL MEDICINE

## 2023-06-28 PROCEDURE — 96374 THER/PROPH/DIAG INJ IV PUSH: CPT

## 2023-06-28 PROCEDURE — 85027 COMPLETE CBC AUTOMATED: CPT

## 2023-06-28 PROCEDURE — 83735 ASSAY OF MAGNESIUM: CPT

## 2023-06-28 PROCEDURE — 36591 DRAW BLOOD OFF VENOUS DEVICE: CPT

## 2023-06-28 PROCEDURE — 96361 HYDRATE IV INFUSION ADD-ON: CPT

## 2023-06-28 PROCEDURE — 77336 RADIATION PHYSICS CONSULT: CPT | Performed by: RADIOLOGY

## 2023-06-28 PROCEDURE — 96366 THER/PROPH/DIAG IV INF ADDON: CPT

## 2023-06-28 PROCEDURE — 96368 THER/DIAG CONCURRENT INF: CPT

## 2023-06-28 PROCEDURE — 2580000003 HC RX 258: Performed by: INTERNAL MEDICINE

## 2023-06-28 PROCEDURE — 96367 TX/PROPH/DG ADDL SEQ IV INF: CPT

## 2023-06-28 RX ORDER — SODIUM CHLORIDE 9 MG/ML
5-250 INJECTION, SOLUTION INTRAVENOUS PRN
Status: DISCONTINUED | OUTPATIENT
Start: 2023-06-28 | End: 2023-06-29 | Stop reason: HOSPADM

## 2023-06-28 RX ORDER — HEPARIN SODIUM 100 [USP'U]/ML
500 INJECTION, SOLUTION INTRAVENOUS PRN
Status: DISCONTINUED | OUTPATIENT
Start: 2023-06-28 | End: 2023-06-29 | Stop reason: HOSPADM

## 2023-06-28 RX ORDER — SODIUM CHLORIDE 0.9 % (FLUSH) 0.9 %
5-40 SYRINGE (ML) INJECTION PRN
Status: DISCONTINUED | OUTPATIENT
Start: 2023-06-28 | End: 2023-06-29 | Stop reason: HOSPADM

## 2023-06-28 RX ORDER — SODIUM CHLORIDE AND POTASSIUM CHLORIDE 150; 900 MG/100ML; MG/100ML
INJECTION, SOLUTION INTRAVENOUS ONCE
Status: COMPLETED | OUTPATIENT
Start: 2023-06-28 | End: 2023-06-28

## 2023-06-28 RX ORDER — MAGNESIUM SULFATE IN WATER 40 MG/ML
2000 INJECTION, SOLUTION INTRAVENOUS ONCE
Status: COMPLETED | OUTPATIENT
Start: 2023-06-28 | End: 2023-06-28

## 2023-06-28 RX ORDER — MAGNESIUM SULFATE 1 G/100ML
1000 INJECTION INTRAVENOUS ONCE
Status: COMPLETED | OUTPATIENT
Start: 2023-06-28 | End: 2023-06-28

## 2023-06-28 RX ORDER — DEXAMETHASONE SODIUM PHOSPHATE 10 MG/ML
10 INJECTION INTRAMUSCULAR; INTRAVENOUS ONCE
Status: COMPLETED | OUTPATIENT
Start: 2023-06-28 | End: 2023-06-28

## 2023-06-28 RX ORDER — PALONOSETRON 0.05 MG/ML
0.25 INJECTION, SOLUTION INTRAVENOUS ONCE
Status: COMPLETED | OUTPATIENT
Start: 2023-06-28 | End: 2023-06-28

## 2023-06-28 RX ADMIN — DEXAMETHASONE SODIUM PHOSPHATE 10 MG: 10 INJECTION INTRAMUSCULAR; INTRAVENOUS at 10:24

## 2023-06-28 RX ADMIN — SODIUM CHLORIDE, PRESERVATIVE FREE 10 ML: 5 INJECTION INTRAVENOUS at 14:37

## 2023-06-28 RX ADMIN — MAGNESIUM SULFATE HEPTAHYDRATE 1000 MG: 1 INJECTION, SOLUTION INTRAVENOUS at 09:21

## 2023-06-28 RX ADMIN — PALONOSETRON 0.25 MG: 0.05 INJECTION, SOLUTION INTRAVENOUS at 10:21

## 2023-06-28 RX ADMIN — SODIUM CHLORIDE, PRESERVATIVE FREE 10 ML: 5 INJECTION INTRAVENOUS at 09:06

## 2023-06-28 RX ADMIN — CISPLATIN 70 MG: 1 INJECTION, SOLUTION INTRAVENOUS at 11:33

## 2023-06-28 RX ADMIN — POTASSIUM CHLORIDE AND SODIUM CHLORIDE: 900; 150 INJECTION, SOLUTION INTRAVENOUS at 09:17

## 2023-06-28 RX ADMIN — SODIUM CHLORIDE 150 ML/HR: 9 INJECTION, SOLUTION INTRAVENOUS at 09:19

## 2023-06-28 RX ADMIN — SODIUM CHLORIDE, PRESERVATIVE FREE 10 ML: 5 INJECTION INTRAVENOUS at 09:07

## 2023-06-28 RX ADMIN — MAGNESIUM SULFATE HEPTAHYDRATE 1000 MG: 1 INJECTION, SOLUTION INTRAVENOUS at 13:22

## 2023-06-28 RX ADMIN — MAGNESIUM SULFATE HEPTAHYDRATE 2000 MG: 40 INJECTION, SOLUTION INTRAVENOUS at 11:31

## 2023-06-28 RX ADMIN — HEPARIN 500 UNITS: 100 SYRINGE at 14:38

## 2023-06-28 RX ADMIN — FOSAPREPITANT 150 MG: 150 INJECTION, POWDER, LYOPHILIZED, FOR SOLUTION INTRAVENOUS at 10:49

## 2023-06-28 ASSESSMENT — PAIN SCALES - GENERAL: PAINLEVEL_OUTOF10: 0

## 2023-06-29 ENCOUNTER — HOSPITAL ENCOUNTER (OUTPATIENT)
Dept: RADIATION ONCOLOGY | Age: 63
Discharge: HOME | End: 2023-06-29
Payer: COMMERCIAL

## 2023-06-29 PROCEDURE — 77386 HC NTSTY MODUL RAD TX DLVR CPLX: CPT | Performed by: RADIOLOGY

## 2023-06-30 ENCOUNTER — HOSPITAL ENCOUNTER (OUTPATIENT)
Dept: RADIATION ONCOLOGY | Age: 63
Discharge: HOME | End: 2023-06-30
Payer: COMMERCIAL

## 2023-06-30 PROCEDURE — 77386 HC NTSTY MODUL RAD TX DLVR CPLX: CPT | Performed by: RADIOLOGY

## 2023-07-03 ENCOUNTER — HOSPITAL ENCOUNTER (OUTPATIENT)
Dept: RADIATION ONCOLOGY | Age: 63
Discharge: HOME | End: 2023-07-03
Payer: COMMERCIAL

## 2023-07-03 PROCEDURE — 77386 HC NTSTY MODUL RAD TX DLVR CPLX: CPT | Performed by: RADIOLOGY

## 2023-07-05 ENCOUNTER — HOSPITAL ENCOUNTER (OUTPATIENT)
Dept: INFUSION THERAPY | Age: 63
Discharge: HOME | End: 2023-07-05
Payer: COMMERCIAL

## 2023-07-05 ENCOUNTER — HOSPITAL ENCOUNTER (OUTPATIENT)
Dept: RADIATION ONCOLOGY | Age: 63
Discharge: HOME | End: 2023-07-05
Payer: COMMERCIAL

## 2023-07-05 ENCOUNTER — TELEPHONE (OUTPATIENT)
Dept: ONCOLOGY | Age: 63
End: 2023-07-05

## 2023-07-05 VITALS
HEART RATE: 94 BPM | WEIGHT: 211 LBS | TEMPERATURE: 99 F | SYSTOLIC BLOOD PRESSURE: 136 MMHG | DIASTOLIC BLOOD PRESSURE: 86 MMHG | BODY MASS INDEX: 38.59 KG/M2 | RESPIRATION RATE: 18 BRPM

## 2023-07-05 VITALS
WEIGHT: 211 LBS | SYSTOLIC BLOOD PRESSURE: 136 MMHG | DIASTOLIC BLOOD PRESSURE: 86 MMHG | RESPIRATION RATE: 18 BRPM | TEMPERATURE: 99 F | BODY MASS INDEX: 38.59 KG/M2 | HEART RATE: 94 BPM

## 2023-07-05 DIAGNOSIS — C53.9 MALIGNANT NEOPLASM OF CERVIX, UNSPECIFIED SITE (HCC): Primary | ICD-10-CM

## 2023-07-05 DIAGNOSIS — C53.0 MALIGNANT NEOPLASM OF ENDOCERVIX (HCC): Primary | ICD-10-CM

## 2023-07-05 LAB
ALBUMIN SERPL-MCNC: 4.1 G/DL (ref 3.5–5.2)
ALBUMIN/GLOB SERPL: 1.3 {RATIO} (ref 1–2.5)
ALP SERPL-CCNC: 100 U/L (ref 35–104)
ALT SERPL-CCNC: 18 U/L (ref 5–33)
ANION GAP SERPL CALCULATED.3IONS-SCNC: 13 MMOL/L (ref 9–17)
AST SERPL-CCNC: 25 U/L
BASOPHILS # BLD: 0 K/UL (ref 0–0.2)
BASOPHILS NFR BLD: 1 % (ref 0–2)
BILIRUB SERPL-MCNC: 0.4 MG/DL (ref 0.3–1.2)
BUN SERPL-MCNC: 11 MG/DL (ref 8–23)
CALCIUM SERPL-MCNC: 8.8 MG/DL (ref 8.6–10.4)
CHLORIDE SERPL-SCNC: 97 MMOL/L (ref 98–107)
CO2 SERPL-SCNC: 24 MMOL/L (ref 20–31)
CREAT SERPL-MCNC: 0.8 MG/DL (ref 0.5–0.9)
EOSINOPHIL # BLD: 0.2 K/UL (ref 0–0.4)
EOSINOPHILS RELATIVE PERCENT: 5 % (ref 1–4)
ERYTHROCYTE [DISTWIDTH] IN BLOOD BY AUTOMATED COUNT: 13.7 % (ref 12.5–15.4)
GFR SERPL CREATININE-BSD FRML MDRD: >60 ML/MIN/1.73M2
GLUCOSE SERPL-MCNC: 134 MG/DL (ref 70–99)
HCT VFR BLD AUTO: 29.7 % (ref 36–46)
HGB BLD-MCNC: 10.3 G/DL (ref 12–16)
LYMPHOCYTES # BLD: 12 % (ref 24–44)
LYMPHOCYTES NFR BLD: 0.5 K/UL (ref 1–4.8)
MAGNESIUM SERPL-MCNC: 1.3 MG/DL (ref 1.6–2.6)
MCH RBC QN AUTO: 30.7 PG (ref 26–34)
MCHC RBC AUTO-ENTMCNC: 34.7 G/DL (ref 31–37)
MCV RBC AUTO: 88.5 FL (ref 80–100)
MONOCYTES NFR BLD: 0.4 K/UL (ref 0.1–1.2)
MONOCYTES NFR BLD: 10 % (ref 2–11)
NEUTROPHILS NFR BLD: 72 % (ref 36–66)
NEUTS SEG NFR BLD: 3.3 K/UL (ref 1.8–7.7)
PLATELET # BLD AUTO: 78 K/UL (ref 140–450)
PMV BLD AUTO: 8.3 FL (ref 6–12)
POTASSIUM SERPL-SCNC: 4.1 MMOL/L (ref 3.7–5.3)
PROT SERPL-MCNC: 7.2 G/DL (ref 6.4–8.3)
RBC # BLD AUTO: 3.35 M/UL (ref 4–5.2)
SODIUM SERPL-SCNC: 134 MMOL/L (ref 135–144)
WBC OTHER # BLD: 4.5 K/UL (ref 3.5–11)

## 2023-07-05 PROCEDURE — 96360 HYDRATION IV INFUSION INIT: CPT

## 2023-07-05 PROCEDURE — 2580000003 HC RX 258: Performed by: INTERNAL MEDICINE

## 2023-07-05 PROCEDURE — 36591 DRAW BLOOD OFF VENOUS DEVICE: CPT

## 2023-07-05 PROCEDURE — 96361 HYDRATE IV INFUSION ADD-ON: CPT

## 2023-07-05 PROCEDURE — 96375 TX/PRO/DX INJ NEW DRUG ADDON: CPT

## 2023-07-05 PROCEDURE — 83735 ASSAY OF MAGNESIUM: CPT

## 2023-07-05 PROCEDURE — 80053 COMPREHEN METABOLIC PANEL: CPT

## 2023-07-05 PROCEDURE — 96368 THER/DIAG CONCURRENT INF: CPT

## 2023-07-05 PROCEDURE — 96367 TX/PROPH/DG ADDL SEQ IV INF: CPT

## 2023-07-05 PROCEDURE — 96413 CHEMO IV INFUSION 1 HR: CPT

## 2023-07-05 PROCEDURE — 77386 HC NTSTY MODUL RAD TX DLVR CPLX: CPT | Performed by: RADIOLOGY

## 2023-07-05 PROCEDURE — 6360000002 HC RX W HCPCS: Performed by: INTERNAL MEDICINE

## 2023-07-05 PROCEDURE — 85027 COMPLETE CBC AUTOMATED: CPT

## 2023-07-05 RX ORDER — DEXAMETHASONE SODIUM PHOSPHATE 10 MG/ML
10 INJECTION INTRAMUSCULAR; INTRAVENOUS ONCE
Status: COMPLETED | OUTPATIENT
Start: 2023-07-05 | End: 2023-07-05

## 2023-07-05 RX ORDER — HEPARIN SODIUM 100 [USP'U]/ML
500 INJECTION, SOLUTION INTRAVENOUS PRN
Status: DISCONTINUED | OUTPATIENT
Start: 2023-07-05 | End: 2023-07-06 | Stop reason: HOSPADM

## 2023-07-05 RX ORDER — PALONOSETRON 0.05 MG/ML
0.25 INJECTION, SOLUTION INTRAVENOUS ONCE
Status: COMPLETED | OUTPATIENT
Start: 2023-07-05 | End: 2023-07-05

## 2023-07-05 RX ORDER — SODIUM CHLORIDE AND POTASSIUM CHLORIDE 150; 900 MG/100ML; MG/100ML
INJECTION, SOLUTION INTRAVENOUS ONCE
Status: COMPLETED | OUTPATIENT
Start: 2023-07-05 | End: 2023-07-05

## 2023-07-05 RX ORDER — SODIUM CHLORIDE 0.9 % (FLUSH) 0.9 %
5-40 SYRINGE (ML) INJECTION PRN
Status: DISCONTINUED | OUTPATIENT
Start: 2023-07-05 | End: 2023-07-06 | Stop reason: HOSPADM

## 2023-07-05 RX ORDER — MAGNESIUM SULFATE 1 G/100ML
1000 INJECTION INTRAVENOUS ONCE
Status: COMPLETED | OUTPATIENT
Start: 2023-07-05 | End: 2023-07-05

## 2023-07-05 RX ORDER — MAGNESIUM SULFATE IN WATER 40 MG/ML
2000 INJECTION, SOLUTION INTRAVENOUS ONCE
Status: COMPLETED | OUTPATIENT
Start: 2023-07-05 | End: 2023-07-05

## 2023-07-05 RX ORDER — SODIUM CHLORIDE 9 MG/ML
5-250 INJECTION, SOLUTION INTRAVENOUS PRN
Status: DISCONTINUED | OUTPATIENT
Start: 2023-07-05 | End: 2023-07-06 | Stop reason: HOSPADM

## 2023-07-05 RX ADMIN — MAGNESIUM SULFATE HEPTAHYDRATE 1000 MG: 1 INJECTION, SOLUTION INTRAVENOUS at 08:27

## 2023-07-05 RX ADMIN — MAGNESIUM SULFATE HEPTAHYDRATE 2000 MG: 40 INJECTION, SOLUTION INTRAVENOUS at 11:16

## 2023-07-05 RX ADMIN — SODIUM CHLORIDE, PRESERVATIVE FREE 10 ML: 5 INJECTION INTRAVENOUS at 13:04

## 2023-07-05 RX ADMIN — HEPARIN 500 UNITS: 100 SYRINGE at 13:04

## 2023-07-05 RX ADMIN — SODIUM CHLORIDE, PRESERVATIVE FREE 10 ML: 5 INJECTION INTRAVENOUS at 08:12

## 2023-07-05 RX ADMIN — DEXAMETHASONE SODIUM PHOSPHATE 10 MG: 10 INJECTION INTRAMUSCULAR; INTRAVENOUS at 09:31

## 2023-07-05 RX ADMIN — POTASSIUM CHLORIDE AND SODIUM CHLORIDE: 900; 150 INJECTION, SOLUTION INTRAVENOUS at 08:24

## 2023-07-05 RX ADMIN — CISPLATIN 70 MG: 1 INJECTION, SOLUTION INTRAVENOUS at 10:11

## 2023-07-05 RX ADMIN — MAGNESIUM SULFATE HEPTAHYDRATE 1000 MG: 1 INJECTION, SOLUTION INTRAVENOUS at 10:08

## 2023-07-05 RX ADMIN — FOSAPREPITANT 150 MG: 150 INJECTION, POWDER, LYOPHILIZED, FOR SOLUTION INTRAVENOUS at 09:43

## 2023-07-05 RX ADMIN — PALONOSETRON 0.25 MG: 0.05 INJECTION, SOLUTION INTRAVENOUS at 09:31

## 2023-07-05 RX ADMIN — SODIUM CHLORIDE 200 ML/HR: 9 INJECTION, SOLUTION INTRAVENOUS at 08:26

## 2023-07-05 RX ADMIN — SODIUM CHLORIDE, PRESERVATIVE FREE 10 ML: 5 INJECTION INTRAVENOUS at 08:11

## 2023-07-05 ASSESSMENT — PAIN SCALES - GENERAL: PAINLEVEL_OUTOF10: 0

## 2023-07-05 NOTE — PROGRESS NOTES
Patient arrived for C1D22 cisplatin. Labs drawn, plts 78, per Dr. Jorge Moore ok to treat today. Mg 1.3, total 4gm of mg given today. Patient tolerated w/o incident and left in stable condition.  Returns 7/12 for tx and 
 used

## 2023-07-05 NOTE — TELEPHONE ENCOUNTER
Patient brought in completed [facility] application to radiation appointment.  submitted application for patient.

## 2023-07-06 ENCOUNTER — HOSPITAL ENCOUNTER (OUTPATIENT)
Dept: RADIATION ONCOLOGY | Age: 63
Discharge: HOME | End: 2023-07-06
Payer: COMMERCIAL

## 2023-07-07 ENCOUNTER — HOSPITAL ENCOUNTER (OUTPATIENT)
Dept: RADIATION ONCOLOGY | Age: 63
Discharge: HOME | End: 2023-07-07
Payer: COMMERCIAL

## 2023-07-07 PROCEDURE — 77386 HC NTSTY MODUL RAD TX DLVR CPLX: CPT | Performed by: RADIOLOGY

## 2023-07-10 ENCOUNTER — HOSPITAL ENCOUNTER (OUTPATIENT)
Dept: RADIATION ONCOLOGY | Age: 63
Discharge: HOME | End: 2023-07-10
Payer: COMMERCIAL

## 2023-07-10 PROCEDURE — 77386 HC NTSTY MODUL RAD TX DLVR CPLX: CPT | Performed by: RADIOLOGY

## 2023-07-11 ENCOUNTER — HOSPITAL ENCOUNTER (OUTPATIENT)
Dept: RADIATION ONCOLOGY | Age: 63
Discharge: HOME | End: 2023-07-11
Payer: COMMERCIAL

## 2023-07-12 ENCOUNTER — HOSPITAL ENCOUNTER (OUTPATIENT)
Dept: RADIATION ONCOLOGY | Age: 63
Discharge: HOME | End: 2023-07-12
Payer: COMMERCIAL

## 2023-07-12 ENCOUNTER — OFFICE VISIT (OUTPATIENT)
Dept: ONCOLOGY | Age: 63
End: 2023-07-12
Payer: COMMERCIAL

## 2023-07-12 ENCOUNTER — HOSPITAL ENCOUNTER (OUTPATIENT)
Dept: INFUSION THERAPY | Age: 63
Discharge: HOME | End: 2023-07-12
Payer: COMMERCIAL

## 2023-07-12 ENCOUNTER — TELEPHONE (OUTPATIENT)
Dept: ONCOLOGY | Age: 63
End: 2023-07-12

## 2023-07-12 VITALS
BODY MASS INDEX: 38.23 KG/M2 | TEMPERATURE: 98.2 F | RESPIRATION RATE: 16 BRPM | WEIGHT: 209 LBS | HEART RATE: 92 BPM | SYSTOLIC BLOOD PRESSURE: 124 MMHG | DIASTOLIC BLOOD PRESSURE: 68 MMHG

## 2023-07-12 VITALS
SYSTOLIC BLOOD PRESSURE: 124 MMHG | RESPIRATION RATE: 18 BRPM | BODY MASS INDEX: 38.23 KG/M2 | DIASTOLIC BLOOD PRESSURE: 68 MMHG | TEMPERATURE: 98.2 F | WEIGHT: 209 LBS | HEART RATE: 92 BPM

## 2023-07-12 VITALS
SYSTOLIC BLOOD PRESSURE: 124 MMHG | DIASTOLIC BLOOD PRESSURE: 68 MMHG | WEIGHT: 209 LBS | HEART RATE: 92 BPM | BODY MASS INDEX: 38.23 KG/M2 | TEMPERATURE: 98.2 F | RESPIRATION RATE: 16 BRPM

## 2023-07-12 DIAGNOSIS — Z51.11 CHEMOTHERAPY MANAGEMENT, ENCOUNTER FOR: ICD-10-CM

## 2023-07-12 DIAGNOSIS — C53.9 MALIGNANT NEOPLASM OF CERVIX, UNSPECIFIED SITE (HCC): Primary | ICD-10-CM

## 2023-07-12 DIAGNOSIS — E83.52 HYPERCALCEMIA: ICD-10-CM

## 2023-07-12 LAB
ALBUMIN SERPL-MCNC: 3.9 G/DL (ref 3.5–5.2)
ALBUMIN/GLOB SERPL: 1.3 {RATIO} (ref 1–2.5)
ALP SERPL-CCNC: 94 U/L (ref 35–104)
ALT SERPL-CCNC: 15 U/L (ref 5–33)
ANION GAP SERPL CALCULATED.3IONS-SCNC: 13 MMOL/L (ref 9–17)
AST SERPL-CCNC: 20 U/L
BASOPHILS # BLD: 0 K/UL (ref 0–0.2)
BASOPHILS NFR BLD: 0 % (ref 0–2)
BILIRUB SERPL-MCNC: 0.3 MG/DL (ref 0.3–1.2)
BUN SERPL-MCNC: 10 MG/DL (ref 8–23)
CALCIUM SERPL-MCNC: 8.3 MG/DL (ref 8.6–10.4)
CHLORIDE SERPL-SCNC: 97 MMOL/L (ref 98–107)
CO2 SERPL-SCNC: 24 MMOL/L (ref 20–31)
CREAT SERPL-MCNC: 0.8 MG/DL (ref 0.5–0.9)
EOSINOPHIL # BLD: 0.03 K/UL (ref 0–0.4)
EOSINOPHILS RELATIVE PERCENT: 1 % (ref 1–4)
ERYTHROCYTE [DISTWIDTH] IN BLOOD BY AUTOMATED COUNT: 13.7 % (ref 12.5–15.4)
GFR SERPL CREATININE-BSD FRML MDRD: >60 ML/MIN/1.73M2
GLUCOSE SERPL-MCNC: 164 MG/DL (ref 70–99)
HCT VFR BLD AUTO: 26.4 % (ref 36–46)
HGB BLD-MCNC: 9.3 G/DL (ref 12–16)
LYMPHOCYTES # BLD: 11 % (ref 24–44)
LYMPHOCYTES NFR BLD: 0.36 K/UL (ref 1–4.8)
MAGNESIUM SERPL-MCNC: 1 MG/DL (ref 1.6–2.6)
MCH RBC QN AUTO: 31.4 PG (ref 26–34)
MCHC RBC AUTO-ENTMCNC: 35.2 G/DL (ref 31–37)
MCV RBC AUTO: 89.2 FL (ref 80–100)
MONOCYTES NFR BLD: 0.43 K/UL (ref 0.1–0.8)
MONOCYTES NFR BLD: 13 % (ref 1–7)
MORPHOLOGY: NORMAL
NEUTROPHILS NFR BLD: 75 % (ref 36–66)
NEUTS SEG NFR BLD: 2.48 K/UL (ref 1.8–7.7)
PLATELET # BLD AUTO: 75 K/UL (ref 140–450)
PMV BLD AUTO: 8.4 FL (ref 6–12)
POTASSIUM SERPL-SCNC: 3.6 MMOL/L (ref 3.7–5.3)
PROT SERPL-MCNC: 6.8 G/DL (ref 6.4–8.3)
RBC # BLD AUTO: 2.96 M/UL (ref 4–5.2)
SODIUM SERPL-SCNC: 134 MMOL/L (ref 135–144)
WBC OTHER # BLD: 3.3 K/UL (ref 3.5–11)

## 2023-07-12 PROCEDURE — 77336 RADIATION PHYSICS CONSULT: CPT | Performed by: RADIOLOGY

## 2023-07-12 PROCEDURE — 85027 COMPLETE CBC AUTOMATED: CPT

## 2023-07-12 PROCEDURE — 96413 CHEMO IV INFUSION 1 HR: CPT

## 2023-07-12 PROCEDURE — 80053 COMPREHEN METABOLIC PANEL: CPT

## 2023-07-12 PROCEDURE — 96367 TX/PROPH/DG ADDL SEQ IV INF: CPT

## 2023-07-12 PROCEDURE — 96361 HYDRATE IV INFUSION ADD-ON: CPT

## 2023-07-12 PROCEDURE — 96376 TX/PRO/DX INJ SAME DRUG ADON: CPT

## 2023-07-12 PROCEDURE — 77386 HC NTSTY MODUL RAD TX DLVR CPLX: CPT | Performed by: RADIOLOGY

## 2023-07-12 PROCEDURE — 3074F SYST BP LT 130 MM HG: CPT | Performed by: INTERNAL MEDICINE

## 2023-07-12 PROCEDURE — 83735 ASSAY OF MAGNESIUM: CPT

## 2023-07-12 PROCEDURE — 36592 COLLECT BLOOD FROM PICC: CPT

## 2023-07-12 PROCEDURE — 2580000003 HC RX 258: Performed by: INTERNAL MEDICINE

## 2023-07-12 PROCEDURE — 99214 OFFICE O/P EST MOD 30 MIN: CPT | Performed by: INTERNAL MEDICINE

## 2023-07-12 PROCEDURE — 96366 THER/PROPH/DIAG IV INF ADDON: CPT

## 2023-07-12 PROCEDURE — 3078F DIAST BP <80 MM HG: CPT | Performed by: INTERNAL MEDICINE

## 2023-07-12 PROCEDURE — 96365 THER/PROPH/DIAG IV INF INIT: CPT

## 2023-07-12 PROCEDURE — 96375 TX/PRO/DX INJ NEW DRUG ADDON: CPT

## 2023-07-12 PROCEDURE — 6360000002 HC RX W HCPCS: Performed by: INTERNAL MEDICINE

## 2023-07-12 PROCEDURE — 96368 THER/DIAG CONCURRENT INF: CPT

## 2023-07-12 PROCEDURE — 36591 DRAW BLOOD OFF VENOUS DEVICE: CPT

## 2023-07-12 RX ORDER — DEXAMETHASONE SODIUM PHOSPHATE 10 MG/ML
10 INJECTION INTRAMUSCULAR; INTRAVENOUS ONCE
Status: COMPLETED | OUTPATIENT
Start: 2023-07-12 | End: 2023-07-12

## 2023-07-12 RX ORDER — ALPRAZOLAM 0.5 MG/1
0.5 TABLET ORAL 2 TIMES DAILY PRN
Qty: 20 TABLET | Refills: 0 | Status: SHIPPED | OUTPATIENT
Start: 2023-07-12 | End: 2023-07-22

## 2023-07-12 RX ORDER — HEPARIN SODIUM 100 [USP'U]/ML
500 INJECTION, SOLUTION INTRAVENOUS PRN
Status: DISCONTINUED | OUTPATIENT
Start: 2023-07-12 | End: 2023-07-13 | Stop reason: HOSPADM

## 2023-07-12 RX ORDER — SODIUM CHLORIDE 0.9 % (FLUSH) 0.9 %
5-40 SYRINGE (ML) INJECTION PRN
Status: DISCONTINUED | OUTPATIENT
Start: 2023-07-12 | End: 2023-07-13 | Stop reason: HOSPADM

## 2023-07-12 RX ORDER — PALONOSETRON 0.05 MG/ML
0.25 INJECTION, SOLUTION INTRAVENOUS ONCE
Status: COMPLETED | OUTPATIENT
Start: 2023-07-12 | End: 2023-07-12

## 2023-07-12 RX ORDER — MAGNESIUM SULFATE IN WATER 40 MG/ML
2000 INJECTION, SOLUTION INTRAVENOUS ONCE
Status: COMPLETED | OUTPATIENT
Start: 2023-07-12 | End: 2023-07-12

## 2023-07-12 RX ORDER — MAGNESIUM SULFATE 1 G/100ML
1000 INJECTION INTRAVENOUS ONCE
Status: COMPLETED | OUTPATIENT
Start: 2023-07-12 | End: 2023-07-12

## 2023-07-12 RX ORDER — SODIUM CHLORIDE 9 MG/ML
5-250 INJECTION, SOLUTION INTRAVENOUS PRN
Status: DISCONTINUED | OUTPATIENT
Start: 2023-07-12 | End: 2023-07-13 | Stop reason: HOSPADM

## 2023-07-12 RX ORDER — SODIUM CHLORIDE AND POTASSIUM CHLORIDE 150; 900 MG/100ML; MG/100ML
INJECTION, SOLUTION INTRAVENOUS ONCE
Status: COMPLETED | OUTPATIENT
Start: 2023-07-12 | End: 2023-07-12

## 2023-07-12 RX ORDER — DIPHENOXYLATE HYDROCHLORIDE AND ATROPINE SULFATE 2.5; .025 MG/1; MG/1
1 TABLET ORAL 4 TIMES DAILY PRN
Qty: 30 TABLET | Refills: 0 | Status: SHIPPED | OUTPATIENT
Start: 2023-07-12 | End: 2023-07-22

## 2023-07-12 RX ORDER — DIPHENOXYLATE HCL/ATROPINE 2.5-.025/5
5 LIQUID (ML) ORAL 4 TIMES DAILY PRN
Status: CANCELLED | OUTPATIENT
Start: 2023-07-12

## 2023-07-12 RX ADMIN — MAGNESIUM SULFATE HEPTAHYDRATE 1000 MG: 1 INJECTION, SOLUTION INTRAVENOUS at 11:32

## 2023-07-12 RX ADMIN — MAGNESIUM SULFATE HEPTAHYDRATE 2000 MG: 40 INJECTION, SOLUTION INTRAVENOUS at 14:46

## 2023-07-12 RX ADMIN — SODIUM CHLORIDE, PRESERVATIVE FREE 10 ML: 5 INJECTION INTRAVENOUS at 17:00

## 2023-07-12 RX ADMIN — PALONOSETRON 0.25 MG: 0.05 INJECTION, SOLUTION INTRAVENOUS at 13:08

## 2023-07-12 RX ADMIN — POTASSIUM CHLORIDE AND SODIUM CHLORIDE: 900; 150 INJECTION, SOLUTION INTRAVENOUS at 11:30

## 2023-07-12 RX ADMIN — SODIUM CHLORIDE, PRESERVATIVE FREE 20 ML: 5 INJECTION INTRAVENOUS at 11:25

## 2023-07-12 RX ADMIN — CISPLATIN 70 MG: 1 INJECTION, SOLUTION INTRAVENOUS at 14:08

## 2023-07-12 RX ADMIN — FOSAPREPITANT 150 MG: 150 INJECTION, POWDER, LYOPHILIZED, FOR SOLUTION INTRAVENOUS at 13:21

## 2023-07-12 RX ADMIN — DEXAMETHASONE SODIUM PHOSPHATE 10 MG: 10 INJECTION INTRAMUSCULAR; INTRAVENOUS at 13:09

## 2023-07-12 RX ADMIN — HEPARIN 500 UNITS: 100 SYRINGE at 17:07

## 2023-07-12 RX ADMIN — SODIUM CHLORIDE 100 ML/HR: 9 INJECTION, SOLUTION INTRAVENOUS at 11:29

## 2023-07-12 RX ADMIN — MAGNESIUM SULFATE HEPTAHYDRATE 1000 MG: 1 INJECTION, SOLUTION INTRAVENOUS at 13:44

## 2023-07-12 NOTE — PROGRESS NOTES
[facility]       Radiation Oncology          [facility], [facility]        Yris Mo: [phone]        F: [phone]       [email or web address]             RADIATION ONCOLOGY WEEKLY PROGRESS NOTE  Patient ID:   Alicia Velarde  : 1960   MRN: [id number]    Location:  [facility] Radiation Oncology,   [facility] [address]   [phone]    DIAGNOSIS:  Cancer Staging   Malignant neoplasm of cervix [facility])  Staging form: Cervix Uteri, AJCC Version 9  - Clinical stage from 2023: FIGO Stage IIIC1 (cT1b3, cN1a, cM0) - Signed by Mahesh Onofre MD on 2023    TREATMENT DETAILS:  Treatment Site: Cervix LNs  Actual Dose: 3600cGy  Total Planned Dose: 4500cGy  Treatment Technique: IMRT  Fraction Technique: Daily  Therapy imaging monitoring: CBCT daily  Concurrent Chemotherapy: Cisplatin    SUBJECTIVE:   Patient seen for their weekly on treatment evaluation today. Reports fatigue, intermittent diarrhea. She is tolerating oral diet and denies nausea and vomiting. Denies vaginal bleeding. OBJECTIVE:     ECO Asymptomatic    VITAL SIGNS: There were no vitals taken for this visit. Wt Readings from Last 5 Encounters:   23 209 lb (94.8 kg)   23 209 lb (94.8 kg)   23 211 lb (95.7 kg)   23 211 lb (95.7 kg)   23 211 lb 9.6 oz (96 kg)     GENERAL:  General appearance is that of a well-nourished, well-developed in no apparent distress. HEART:  Normal rate and regular rhythm  LUNGS:  Pulmonary effort normal.  ABDOMEN:  Soft, nontender, non distended  EXTREMITIES:  No edema. No calf tenderness. MSK:  No spinal tenderness. Normal ROM. NEUROLOGICAL: Alert and oriented. Strength and sensation intact bilaterally. No focal deficits.    PSYCH: Mood normal, behavior normal.      LABS:  WBC   Date Value Ref Range Status   2023 3.3 (L) 3.5 - 11.0 k/uL Final   2023 4.5 3.5 - 11.0 k/uL Final   2023

## 2023-07-12 NOTE — TELEPHONE ENCOUNTER
Name: Padmini David  : 1960  MRN: [id number]    Oncology Navigation Follow-Up Note    Contact Type:  Medical Oncology    Notes: Pt @ [facility] for Dr. Shahla Townsend f/u & tx. Met w/pt in infusion area. Pt denied questions/concerns. Encouraged to contact writer prn. Will continue to follow.     Electronically signed by Justyna Brasher RN on 2023 at 1:32 PM

## 2023-07-12 NOTE — PROGRESS NOTES
Patient arrived for C1D29 cisplatin   Labs collected and reviewed   Seen by Dr Chu  in tx   Jeanie Yeung to proceed with PLT 75 - mag 1.0 4 gms given   Tolerated w/o incident   Return 7/19 tx and dr Katie Veras, RN

## 2023-07-13 ENCOUNTER — OFFICE VISIT (OUTPATIENT)
Dept: GYNECOLOGIC ONCOLOGY | Age: 63
End: 2023-07-13
Payer: COMMERCIAL

## 2023-07-13 ENCOUNTER — HOSPITAL ENCOUNTER (OUTPATIENT)
Dept: RADIATION ONCOLOGY | Age: 63
Discharge: HOME | End: 2023-07-13
Payer: COMMERCIAL

## 2023-07-13 VITALS
SYSTOLIC BLOOD PRESSURE: 151 MMHG | HEART RATE: 85 BPM | DIASTOLIC BLOOD PRESSURE: 81 MMHG | OXYGEN SATURATION: 97 % | WEIGHT: 213 LBS | BODY MASS INDEX: 37.74 KG/M2 | HEIGHT: 63 IN

## 2023-07-13 DIAGNOSIS — C53.1 MALIGNANT NEOPLASM OF EXOCERVIX (HCC): Primary | ICD-10-CM

## 2023-07-13 PROCEDURE — 3079F DIAST BP 80-89 MM HG: CPT | Performed by: OBSTETRICS & GYNECOLOGY

## 2023-07-13 PROCEDURE — 77386 HC NTSTY MODUL RAD TX DLVR CPLX: CPT | Performed by: RADIOLOGY

## 2023-07-13 PROCEDURE — 3077F SYST BP >= 140 MM HG: CPT | Performed by: OBSTETRICS & GYNECOLOGY

## 2023-07-13 PROCEDURE — 99213 OFFICE O/P EST LOW 20 MIN: CPT | Performed by: OBSTETRICS & GYNECOLOGY

## 2023-07-13 ASSESSMENT — ENCOUNTER SYMPTOMS
EYES NEGATIVE: 1
DIARRHEA: 1
RESPIRATORY NEGATIVE: 1

## 2023-07-13 NOTE — PROGRESS NOTES
Gyn Oncology Attending Note    Patient seen and evaluated by me today  Chart, notes, imaging, records, labs, history revd by me in detail today  She tells me that I have answered her questions to her satisfaction today  I spent 20 minutes managing this case in the office today  Exam reveals a friable cervix with visible cancer  She has had a substantial reduction in tumor bulk compared to pre treatment notes. She is much reassured by this today  Plan for PAULIE sleeve placement next week with Dr Mary Olsen  Complete RT and brachytherapy in the weeks ahead  FU with our team here for cervical cancer fu/surveillance in November/December 2023.   Call or return sooner as needed      Sourav Mclaughlin MD
Review of Systems   Constitutional: Negative. HENT:  Negative. Eyes: Negative. Respiratory: Negative. Cardiovascular: Negative. Gastrointestinal:  Positive for diarrhea. Endocrine: Negative. Genitourinary: Negative. Musculoskeletal: Negative. Skin: Negative. Neurological: Negative. Hematological:  Bruises/bleeds easily.
(impaired fasting glucose) 2020    Panic disorder          Past Surgical History:   Procedure Laterality Date    CHOLECYSTECTOMY      DENTAL SURGERY      teeth pulled due to gum disease    IR PORT PLACEMENT EQUAL OR GREATER THAN 5 YEARS  2023    IR PORT PLACEMENT EQUAL OR GREATER THAN 5 YEARS 2023 MD JEANA Wolff SPECIAL PROCEDURES    TUBAL LIGATION      TUNNELED VENOUS PORT PLACEMENT  2023    chemo port         Family History   Problem Relation Age of Onset    High Blood Pressure Father     COPD Father     High Cholesterol Father     Cataracts Father     High Blood Pressure Sister     High Cholesterol Sister     Thyroid Disease Sister     Anxiety Disorder Sister     Emphysema Brother     Heart Failure Brother          Social History     Tobacco Use    Smoking status: Former     Packs/day: 1.00     Years: 43.00     Pack years: 43.00     Types: Cigarettes     Quit date: 2018     Years since quittin.2    Smokeless tobacco: Never   Vaping Use    Vaping Use: Never used   Substance Use Topics    Alcohol use: Yes     Alcohol/week: 3.0 standard drinks     Types: 3 Cans of beer per week     Comment: 2-3 BEERS WEEKENDS    Drug use: Yes     Frequency: 14.0 times per week     Types: Marijuana Jp Monae     Comment: EDIBLES DAILY         Allergies   Allergen Reactions    Atorvastatin Other (See Comments)     Causes Muscle cramps         Physical Exam:    There were no vitals filed for this visit. General: well- appearing, no acute distress, alert and oriented x 3    HEENT: Thyroid normal size. No cervical or supraclavicular lymphadenopathy. Lungs: Clear to auscultate bilaterally to the bases    No CVA tenderness present bilaterally. Heart: Auscultation reveals regular rate and rhythm. No murmurs or gallops appreciated. Abdomen: Soft, non-tender. No detectable organomegaly. No palpable masses or ascites. No inguinal lymphadenopathy bilaterally.     Extremities: No edema, calf

## 2023-07-14 ENCOUNTER — TELEPHONE (OUTPATIENT)
Dept: GYNECOLOGIC ONCOLOGY | Age: 63
End: 2023-07-14

## 2023-07-14 ENCOUNTER — HOSPITAL ENCOUNTER (OUTPATIENT)
Dept: RADIATION ONCOLOGY | Age: 63
Discharge: HOME | End: 2023-07-14
Payer: COMMERCIAL

## 2023-07-14 PROCEDURE — 77386 HC NTSTY MODUL RAD TX DLVR CPLX: CPT | Performed by: RADIOLOGY

## 2023-07-14 NOTE — TELEPHONE ENCOUNTER
Dr Vannessa Peguero-  Surgery 7/20/23  @ 10:00am arrive at 8:00am@ [facility]   Patient aware and voiced understanding will be consented day of.

## 2023-07-17 ENCOUNTER — HOSPITAL ENCOUNTER (OUTPATIENT)
Dept: RADIATION ONCOLOGY | Age: 63
Discharge: HOME | End: 2023-07-17
Payer: COMMERCIAL

## 2023-07-17 PROCEDURE — 77386 HC NTSTY MODUL RAD TX DLVR CPLX: CPT | Performed by: RADIOLOGY

## 2023-07-18 ENCOUNTER — HOSPITAL ENCOUNTER (OUTPATIENT)
Dept: RADIATION ONCOLOGY | Age: 63
Discharge: HOME | End: 2023-07-18
Payer: COMMERCIAL

## 2023-07-18 DIAGNOSIS — C53.9 MALIGNANT NEOPLASM OF CERVIX, UNSPECIFIED SITE (HCC): Primary | ICD-10-CM

## 2023-07-18 PROCEDURE — 77386 HC NTSTY MODUL RAD TX DLVR CPLX: CPT | Performed by: RADIOLOGY

## 2023-07-19 ENCOUNTER — HOSPITAL ENCOUNTER (OUTPATIENT)
Dept: RADIATION ONCOLOGY | Age: 63
Discharge: HOME | End: 2023-07-19
Payer: COMMERCIAL

## 2023-07-19 ENCOUNTER — TELEPHONE (OUTPATIENT)
Dept: INFUSION THERAPY | Age: 63
End: 2023-07-19

## 2023-07-19 ENCOUNTER — HOSPITAL ENCOUNTER (OUTPATIENT)
Dept: INFUSION THERAPY | Age: 63
Discharge: HOME | End: 2023-07-19

## 2023-07-19 VITALS
DIASTOLIC BLOOD PRESSURE: 82 MMHG | TEMPERATURE: 98.2 F | WEIGHT: 209 LBS | BODY MASS INDEX: 37.62 KG/M2 | OXYGEN SATURATION: 96 % | HEART RATE: 89 BPM | RESPIRATION RATE: 16 BRPM | SYSTOLIC BLOOD PRESSURE: 161 MMHG

## 2023-07-19 NOTE — TELEPHONE ENCOUNTER
Pt scheduled for 1100 for C1D36 Cisplatin. Writer called pt at home and pt stated Dr Lesly Krishnamurthy told her she did not need chemo since she has received 5 treatment. Spoke with Dr Albin Paulson and he stated pt completed with chemotherapy and to follo-up with him in 3 weeks in the office. Appt made for office visit 8/11/23 @ 8111. Pt updated on next appt.

## 2023-07-20 ENCOUNTER — HOSPITAL ENCOUNTER (OUTPATIENT)
Age: 63
Setting detail: OUTPATIENT SURGERY
Discharge: HOME | End: 2023-07-20
Attending: OBSTETRICS & GYNECOLOGY | Admitting: OBSTETRICS & GYNECOLOGY
Payer: COMMERCIAL

## 2023-07-20 ENCOUNTER — HOSPITAL ENCOUNTER (OUTPATIENT)
Dept: ULTRASOUND IMAGING | Age: 63
Setting detail: OUTPATIENT SURGERY
Discharge: HOME | End: 2023-07-22
Attending: OBSTETRICS & GYNECOLOGY
Payer: COMMERCIAL

## 2023-07-20 ENCOUNTER — ANESTHESIA EVENT (OUTPATIENT)
Dept: OPERATING ROOM | Age: 63
End: 2023-07-20
Payer: COMMERCIAL

## 2023-07-20 ENCOUNTER — ANESTHESIA (OUTPATIENT)
Dept: OPERATING ROOM | Age: 63
End: 2023-07-20
Payer: COMMERCIAL

## 2023-07-20 VITALS
HEART RATE: 90 BPM | RESPIRATION RATE: 14 BRPM | OXYGEN SATURATION: 99 % | SYSTOLIC BLOOD PRESSURE: 143 MMHG | DIASTOLIC BLOOD PRESSURE: 72 MMHG | TEMPERATURE: 97.5 F

## 2023-07-20 PROBLEM — E66.01 OBESITY, CLASS III, BMI 40-49.9 (MORBID OBESITY) (HCC): Status: ACTIVE | Noted: 2023-07-20

## 2023-07-20 PROBLEM — Z92.21 HISTORY OF CHEMOTHERAPY: Status: ACTIVE | Noted: 2023-07-20

## 2023-07-20 PROBLEM — E66.813 OBESITY, CLASS III, BMI 40-49.9 (MORBID OBESITY): Status: ACTIVE | Noted: 2023-07-20

## 2023-07-20 PROBLEM — Z92.3 HISTORY OF RADIATION THERAPY: Status: ACTIVE | Noted: 2023-07-20

## 2023-07-20 PROBLEM — C53.8 MALIGNANT NEOPLASM OF OVERLAPPING SITES OF CERVIX UTERI (HCC): Status: ACTIVE | Noted: 2023-05-19

## 2023-07-20 PROCEDURE — 7100000001 HC PACU RECOVERY - ADDTL 15 MIN: Performed by: OBSTETRICS & GYNECOLOGY

## 2023-07-20 PROCEDURE — 6360000002 HC RX W HCPCS

## 2023-07-20 PROCEDURE — 76857 US EXAM PELVIC LIMITED: CPT

## 2023-07-20 PROCEDURE — 6360000002 HC RX W HCPCS: Performed by: STUDENT IN AN ORGANIZED HEALTH CARE EDUCATION/TRAINING PROGRAM

## 2023-07-20 PROCEDURE — 2709999900 HC NON-CHARGEABLE SUPPLY: Performed by: OBSTETRICS & GYNECOLOGY

## 2023-07-20 PROCEDURE — 2580000003 HC RX 258: Performed by: OBSTETRICS & GYNECOLOGY

## 2023-07-20 PROCEDURE — 3700000000 HC ANESTHESIA ATTENDED CARE: Performed by: OBSTETRICS & GYNECOLOGY

## 2023-07-20 PROCEDURE — 2720000010 HC SURG SUPPLY STERILE: Performed by: OBSTETRICS & GYNECOLOGY

## 2023-07-20 PROCEDURE — 7100000000 HC PACU RECOVERY - FIRST 15 MIN: Performed by: OBSTETRICS & GYNECOLOGY

## 2023-07-20 PROCEDURE — 3600000013 HC SURGERY LEVEL 3 ADDTL 15MIN: Performed by: OBSTETRICS & GYNECOLOGY

## 2023-07-20 PROCEDURE — 7100000010 HC PHASE II RECOVERY - FIRST 15 MIN: Performed by: OBSTETRICS & GYNECOLOGY

## 2023-07-20 PROCEDURE — 57155 INSERT UTERI TANDEM/OVOIDS: CPT | Performed by: OBSTETRICS & GYNECOLOGY

## 2023-07-20 PROCEDURE — 3700000001 HC ADD 15 MINUTES (ANESTHESIA): Performed by: OBSTETRICS & GYNECOLOGY

## 2023-07-20 PROCEDURE — 6360000002 HC RX W HCPCS: Performed by: PHYSICIAN ASSISTANT

## 2023-07-20 PROCEDURE — 3600000003 HC SURGERY LEVEL 3 BASE: Performed by: OBSTETRICS & GYNECOLOGY

## 2023-07-20 PROCEDURE — 2500000003 HC RX 250 WO HCPCS

## 2023-07-20 RX ORDER — HYDRALAZINE HYDROCHLORIDE 20 MG/ML
10 INJECTION INTRAMUSCULAR; INTRAVENOUS
Status: DISCONTINUED | OUTPATIENT
Start: 2023-07-20 | End: 2023-07-20 | Stop reason: HOSPADM

## 2023-07-20 RX ORDER — ONDANSETRON 2 MG/ML
4 INJECTION INTRAMUSCULAR; INTRAVENOUS
Status: DISCONTINUED | OUTPATIENT
Start: 2023-07-20 | End: 2023-07-20 | Stop reason: HOSPADM

## 2023-07-20 RX ORDER — CELECOXIB 200 MG/1
200 CAPSULE ORAL 2 TIMES DAILY
Qty: 60 CAPSULE | Refills: 1 | Status: SHIPPED | OUTPATIENT
Start: 2023-07-20

## 2023-07-20 RX ORDER — PROPOFOL 10 MG/ML
INJECTION, EMULSION INTRAVENOUS PRN
Status: DISCONTINUED | OUTPATIENT
Start: 2023-07-20 | End: 2023-07-20 | Stop reason: SDUPTHER

## 2023-07-20 RX ORDER — SODIUM CHLORIDE, SODIUM LACTATE, POTASSIUM CHLORIDE, CALCIUM CHLORIDE 600; 310; 30; 20 MG/100ML; MG/100ML; MG/100ML; MG/100ML
INJECTION, SOLUTION INTRAVENOUS CONTINUOUS
Status: DISCONTINUED | OUTPATIENT
Start: 2023-07-20 | End: 2023-07-20 | Stop reason: HOSPADM

## 2023-07-20 RX ORDER — ACETAMINOPHEN 500 MG
1000 TABLET ORAL 3 TIMES DAILY
Qty: 540 TABLET | Refills: 1 | Status: SHIPPED | OUTPATIENT
Start: 2023-07-20

## 2023-07-20 RX ORDER — ENOXAPARIN SODIUM 100 MG/ML
40 INJECTION SUBCUTANEOUS
Status: DISCONTINUED | OUTPATIENT
Start: 2023-07-20 | End: 2023-07-20 | Stop reason: HOSPADM

## 2023-07-20 RX ORDER — SODIUM CHLORIDE 0.9 % (FLUSH) 0.9 %
5-40 SYRINGE (ML) INJECTION EVERY 12 HOURS SCHEDULED
Status: DISCONTINUED | OUTPATIENT
Start: 2023-07-20 | End: 2023-07-20 | Stop reason: HOSPADM

## 2023-07-20 RX ORDER — SODIUM CHLORIDE 9 MG/ML
INJECTION, SOLUTION INTRAVENOUS PRN
Status: DISCONTINUED | OUTPATIENT
Start: 2023-07-20 | End: 2023-07-20 | Stop reason: HOSPADM

## 2023-07-20 RX ORDER — ONDANSETRON 2 MG/ML
INJECTION INTRAMUSCULAR; INTRAVENOUS PRN
Status: DISCONTINUED | OUTPATIENT
Start: 2023-07-20 | End: 2023-07-20 | Stop reason: SDUPTHER

## 2023-07-20 RX ORDER — SODIUM CHLORIDE 0.9 % (FLUSH) 0.9 %
5-40 SYRINGE (ML) INJECTION PRN
Status: DISCONTINUED | OUTPATIENT
Start: 2023-07-20 | End: 2023-07-20 | Stop reason: HOSPADM

## 2023-07-20 RX ORDER — LIDOCAINE HYDROCHLORIDE 10 MG/ML
INJECTION, SOLUTION EPIDURAL; INFILTRATION; INTRACAUDAL; PERINEURAL PRN
Status: DISCONTINUED | OUTPATIENT
Start: 2023-07-20 | End: 2023-07-20 | Stop reason: SDUPTHER

## 2023-07-20 RX ORDER — FENTANYL CITRATE 50 UG/ML
INJECTION, SOLUTION INTRAMUSCULAR; INTRAVENOUS PRN
Status: DISCONTINUED | OUTPATIENT
Start: 2023-07-20 | End: 2023-07-20 | Stop reason: SDUPTHER

## 2023-07-20 RX ADMIN — HYDROMORPHONE HYDROCHLORIDE 0.25 MG: 1 INJECTION, SOLUTION INTRAMUSCULAR; INTRAVENOUS; SUBCUTANEOUS at 12:10

## 2023-07-20 RX ADMIN — ONDANSETRON 4 MG: 2 INJECTION INTRAMUSCULAR; INTRAVENOUS at 11:15

## 2023-07-20 RX ADMIN — LIDOCAINE HYDROCHLORIDE 50 MG: 10 INJECTION, SOLUTION EPIDURAL; INFILTRATION; INTRACAUDAL; PERINEURAL at 10:55

## 2023-07-20 RX ADMIN — FENTANYL CITRATE 25 MCG: 50 INJECTION, SOLUTION INTRAMUSCULAR; INTRAVENOUS at 11:06

## 2023-07-20 RX ADMIN — Medication 2 G: at 11:04

## 2023-07-20 RX ADMIN — HYDROMORPHONE HYDROCHLORIDE 0.25 MG: 1 INJECTION, SOLUTION INTRAMUSCULAR; INTRAVENOUS; SUBCUTANEOUS at 12:20

## 2023-07-20 RX ADMIN — SODIUM CHLORIDE, POTASSIUM CHLORIDE, SODIUM LACTATE AND CALCIUM CHLORIDE: 600; 310; 30; 20 INJECTION, SOLUTION INTRAVENOUS at 09:44

## 2023-07-20 RX ADMIN — PROPOFOL 150 MG: 10 INJECTION, EMULSION INTRAVENOUS at 10:55

## 2023-07-20 RX ADMIN — FENTANYL CITRATE 25 MCG: 50 INJECTION, SOLUTION INTRAMUSCULAR; INTRAVENOUS at 11:14

## 2023-07-20 RX ADMIN — ENOXAPARIN SODIUM 40 MG: 40 INJECTION SUBCUTANEOUS at 09:43

## 2023-07-20 RX ADMIN — FENTANYL CITRATE 50 MCG: 50 INJECTION, SOLUTION INTRAMUSCULAR; INTRAVENOUS at 11:55

## 2023-07-20 RX ADMIN — FENTANYL CITRATE 50 MCG: 50 INJECTION, SOLUTION INTRAMUSCULAR; INTRAVENOUS at 10:55

## 2023-07-20 RX ADMIN — FENTANYL CITRATE 50 MCG: 50 INJECTION, SOLUTION INTRAMUSCULAR; INTRAVENOUS at 11:41

## 2023-07-20 ASSESSMENT — PAIN SCALES - GENERAL
PAINLEVEL_OUTOF10: 4
PAINLEVEL_OUTOF10: 5

## 2023-07-20 ASSESSMENT — PAIN DESCRIPTION - LOCATION: LOCATION: ABDOMEN

## 2023-07-20 NOTE — PROGRESS NOTES
GYN/Onc Pre-Op H&P  [address]    Patient Name: Osmel Gonzalez     Patient : 1960  Room/Bed: [facility] OR [facility]/NONE  Admission Date/Time: 2023  7:59 AM  Primary Care Physician: Makeda Martinez DO  MRN: [id number]    Date: 2023  Time: 8:03 AM    The patient was seen in pre-op holding. She is here for EUA, cervical dilation and placement of PAULIE sleeve under ultrasound guidance. Please refer to Progress Note dated 23 by Urban Cueto MD for further details. There have been no changes to health history or medications since this visit. The procedure risks and complications were reviewed. The labs, Consent, and H&P were reviewed and updated. The patient was counseled on the possibility of  the need of a second surgery. The patient voiced understanding and had all of her questions answered. The possibility of incomplete removal of abnormal tissue was discussed. ALLERGIES:  Allergies as of 2023 - Fully Reviewed 2023   Allergen Reaction Noted    Atorvastatin Other (See Comments) 2018       MEDICATIONS:  No current facility-administered medications for this encounter. VITALS:  There were no vitals filed for this visit.                                                                                                                               LAB RESULTS:  Hospital Outpatient Visit on 2023   Component Date Value Ref Range Status    Magnesium 2023 1.0 (L)  1.6 - 2.6 mg/dL Final    WBC 2023 3.3 (L)  3.5 - 11.0 k/uL Final    RBC 2023 2.96 (L)  4.0 - 5.2 m/uL Final    Hemoglobin 2023 9.3 (L)  12.0 - 16.0 g/dL Final    Hematocrit 2023 26.4 (L)  36 - 46 % Final    MCV 2023 89.2  80 - 100 fL Final    MCH 2023 31.4  26 - 34 pg Final    MCHC 2023 35.2  31 - 37 g/dL Final    RDW 2023 13.7  12.5 - 15.4 % Final    Platelets  75 (L)  140 - 450 k/uL Final    MPV 2023 8.4  6.0 - - 8.3 g/dL Final    Albumin 06/28/2023 4.1  3.5 - 5.2 g/dL Final    Albumin/Globulin Ratio 06/28/2023 1.3  1.0 - 2.5 Final    WBC 06/28/2023 6.4  3.5 - 11.0 k/uL Final    RBC 06/28/2023 3.55 (L)  4.0 - 5.2 m/uL Final    Hemoglobin 06/28/2023 10.9 (L)  12.0 - 16.0 g/dL Final    Hematocrit 06/28/2023 31.5 (L)  36 - 46 % Final    MCV 06/28/2023 88.9  80 - 100 fL Final    MCH 06/28/2023 30.8  26 - 34 pg Final    MCHC 06/28/2023 34.7  31 - 37 g/dL Final    RDW 06/28/2023 13.4  12.5 - 15.4 % Final    Platelets 89/02/7551 104 (L)  140 - 450 k/uL Final    MPV 06/28/2023 8.3  6.0 - 12.0 fL Final    Seg Neutrophils 06/28/2023 80 (H)  36 - 66 % Final    Lymphocytes 06/28/2023 10 (L)  24 - 44 % Final    Monocytes 06/28/2023 7  1 - 7 % Final    Eosinophils % 06/28/2023 3  1 - 4 % Final    Basophils 06/28/2023 0  0 - 2 % Final    Segs Absolute 06/28/2023 5.12  1.8 - 7.7 k/uL Final    Absolute Lymph # 06/28/2023 0.64 (L)  1.0 - 4.8 k/uL Final    Absolute Mono # 06/28/2023 0.45  0.1 - 0.8 k/uL Final    Absolute Eos # 06/28/2023 0.19  0.0 - 0.4 k/uL Final    Basophils Absolute 06/28/2023 0.00  0.0 - 0.2 k/uL Final    Morphology 06/28/2023 Normal   Final       DIAGNOSTICS:  MRI of pelvis 5/26/23    IMPRESSION:  1. No cervical mass consistent with the history of cervical cancer is  identified measuring 6.2 x 6.4 x 3.5 cm extending into the upper 3rd of the  vaginal canal and involving the vaginal wall partly along its anterior aspect  and also along the left side. 2. 2.0 x 1.1 cm left common iliac lymph node close to the bifurcation which  was FDG avid on prior PET-CT. 8 mm right distal external iliac lymph node in  the groin is not FDG avid. 3. No ascites. 4. No appreciable involvement of urinary bladder or rectum or other pelvic  extension. DIAGNOSIS & PLAN:  1.  Cervical Squamous Cell Carcinoma, FGIO stage III C1   - Proceed with planned procedure:  EUA, cervical dilation and placement of PAULIE sleeve under ultrasound

## 2023-07-20 NOTE — ANESTHESIA PRE PROCEDURE
07/12/2023 11:30 AM    BILITOT 0.3 07/12/2023 11:30 AM    ALKPHOS 94 07/12/2023 11:30 AM    AST 20 07/12/2023 11:30 AM    ALT 15 07/12/2023 11:30 AM       POC Tests: No results for input(s): POCGLU, POCNA, POCK, POCCL, POCBUN, POCHEMO, POCHCT in the last 72 hours. Coags:   Lab Results   Component Value Date/Time    PROTIME 14.0 05/31/2023 12:41 PM    INR 1.1 05/31/2023 12:41 PM       HCG (If Applicable): No results found for: PREGTESTUR, PREGSERUM, HCG, HCGQUANT     ABGs: No results found for: PHART, PO2ART, FQP9CUU, JKW4QTT, BEART, R1OVZFXG     Type & Screen (If Applicable):  No results found for: LABABO, LABRH    Drug/Infectious Status (If Applicable):  Lab Results   Component Value Date/Time    HEPCAB NONREACTIVE 08/08/2016 04:03 PM       COVID-19 Screening (If Applicable): No results found for: COVID19        Anesthesia Evaluation  Patient summary reviewed and Nursing notes reviewed no history of anesthetic complications:   Airway: Mallampati: III  TM distance: >3 FB   Neck ROM: full  Mouth opening: > = 3 FB   Dental: normal exam         Pulmonary:Negative Pulmonary ROS breath sounds clear to auscultation                             Cardiovascular:    (+) hypertension:, hyperlipidemia        Rhythm: regular  Rate: normal                    Neuro/Psych:   (+) psychiatric history:            GI/Hepatic/Renal: Neg GI/Hepatic/Renal ROS            Endo/Other: Negative Endo/Other ROS   (+) malignancy/cancer. Abdominal:             Vascular: negative vascular ROS. Other Findings:           Anesthesia Plan      general     ASA 3       Induction: intravenous. MIPS: Postoperative opioids intended and Prophylactic antiemetics administered. Anesthetic plan and risks discussed with patient. Plan discussed with CRNA.                     Christa Sosa MD   7/20/2023

## 2023-07-20 NOTE — PROGRESS NOTES
Received call from Dr Elana Ball oncology and updated about redness at her chest port.   Nurse had perfect served  while in [facility]

## 2023-07-20 NOTE — PROGRESS NOTES
Right ant chest at port noted to be red and warm to touch, redness approx 1-1.5 inch   no drng noted

## 2023-07-20 NOTE — OP NOTE
OPERATIVE NOTE  PATIENT: Mora Gonzalez  YOB: 1960  MRN: [id number]  DATE OF PROCEDURE: 07/20/2023     PRE-OP DIAGNOSIS:   Stage IIIC1r, grade 1, Squamous Cell Cervix Cancer  Treated with whole pelvic radiation therapy, with concomitant weekly Cisplatin  Class III obesity (BMI 40)     POST-OP DIAGNOSIS:   Stage IIIC1r, grade 1, Squamous Cell Cervix Cancer  Treated with whole pelvic radiation therapy, with concomitant weekly Cisplatin  Class III obesity (BMI 40)       PROCEDURE:  Examination under anesthesia + Cervical dilation, with placement of Baldomero Sleeve, under ultrasound guidance     SURGEON: Any Rolon MD     ASSISTANT: Myke Morales, DO Daysi Ramsey MD     ANESTHESIA: General     ESTIMATED BLOOD LOSS (mL): 5cc     COMPLICATIONS: None noted at the end of the procedure     SPECIMENS: None     INDICATIONS:  63yo with a Stage IIIC1r Squamous Cell Carcinoma (SCC) of the CERVIX (Diagnosed 2023), based on a cervix biopsy (05/02/23). PET/CT-scan demonstrates a hyperavidity in the cervix, upper vagina and LEFT common iliac lymph nodes (05/12/23). Ectocervix tumor remains present on the posterior lip of the cervix. Whole pelvic radiation therapy completed 07/20/23, concomitant with 5 cycles of weekly Cisplatin therapy (C5: 07/12/23). Recommendations have been made to proceed with [facility] Sleeve placement in preparation of Tandem & Ovoid intracavitary brachytherapy. She has been advised of the potential risks, benefits and alternatives to the procedure. Informed consent was obtained     FINDINGS:   Vulva is normal appearing. No tumor present on the anterior lip of the cervix. Friable soft tumor noted on the posterior lip of the cervix, with obliteration of the posterior vaginal fornix (3o'clock to 9o'clock). Cervix and vaginal fornices retracted due to prior tumor and radiation induced fibrosis. Radiation changes noted in the vagina and cervix. 5cm uterine sound.      DETAILED DESCRIPTION OF

## 2023-07-21 ENCOUNTER — HOSPITAL ENCOUNTER (OUTPATIENT)
Dept: MRI IMAGING | Age: 63
Discharge: HOME | End: 2023-07-21
Attending: RADIOLOGY
Payer: COMMERCIAL

## 2023-07-21 DIAGNOSIS — C53.0 MALIGNANT NEOPLASM OF ENDOCERVIX (HCC): ICD-10-CM

## 2023-07-21 PROCEDURE — 6360000004 HC RX CONTRAST MEDICATION: Performed by: RADIOLOGY

## 2023-07-21 PROCEDURE — 2580000003 HC RX 258: Performed by: RADIOLOGY

## 2023-07-21 PROCEDURE — A9579 GAD-BASE MR CONTRAST NOS,1ML: HCPCS | Performed by: RADIOLOGY

## 2023-07-21 PROCEDURE — 72197 MRI PELVIS W/O & W/DYE: CPT

## 2023-07-21 RX ORDER — SODIUM CHLORIDE 0.9 % (FLUSH) 0.9 %
10 SYRINGE (ML) INJECTION PRN
Status: DISCONTINUED | OUTPATIENT
Start: 2023-07-21 | End: 2023-07-24 | Stop reason: HOSPADM

## 2023-07-21 RX ORDER — 0.9 % SODIUM CHLORIDE 0.9 %
50 INTRAVENOUS SOLUTION INTRAVENOUS ONCE
Status: COMPLETED | OUTPATIENT
Start: 2023-07-21 | End: 2023-07-21

## 2023-07-21 RX ADMIN — GADOTERIDOL 20 ML: 279.3 INJECTION, SOLUTION INTRAVENOUS at 11:06

## 2023-07-21 RX ADMIN — SODIUM CHLORIDE 50 ML: 9 INJECTION, SOLUTION INTRAVENOUS at 11:07

## 2023-07-21 RX ADMIN — SODIUM CHLORIDE, PRESERVATIVE FREE 10 ML: 5 INJECTION INTRAVENOUS at 11:07

## 2023-07-25 ENCOUNTER — HOSPITAL ENCOUNTER (OUTPATIENT)
Dept: RADIATION ONCOLOGY | Age: 63
Discharge: HOME | End: 2023-07-25
Payer: COMMERCIAL

## 2023-07-25 ENCOUNTER — TELEPHONE (OUTPATIENT)
Dept: ONCOLOGY | Age: 63
End: 2023-07-25

## 2023-07-25 VITALS
WEIGHT: 209 LBS | HEART RATE: 90 BPM | BODY MASS INDEX: 37.62 KG/M2 | RESPIRATION RATE: 16 BRPM | SYSTOLIC BLOOD PRESSURE: 138 MMHG | TEMPERATURE: 98.2 F | OXYGEN SATURATION: 97 % | DIASTOLIC BLOOD PRESSURE: 73 MMHG

## 2023-07-25 PROCEDURE — 6370000000 HC RX 637 (ALT 250 FOR IP): Performed by: RADIOLOGY

## 2023-07-25 PROCEDURE — 57155 INSERT UTERI TANDEM/OVOIDS: CPT | Performed by: RADIOLOGY

## 2023-07-25 PROCEDURE — 77300 RADIATION THERAPY DOSE PLAN: CPT | Performed by: RADIOLOGY

## 2023-07-25 PROCEDURE — 77771 HDR RDNCL NTRSTL/ICAV BRCHTX: CPT | Performed by: RADIOLOGY

## 2023-07-25 PROCEDURE — 77295 3-D RADIOTHERAPY PLAN: CPT | Performed by: RADIOLOGY

## 2023-07-25 PROCEDURE — 77290 THER RAD SIMULAJ FIELD CPLX: CPT | Performed by: RADIOLOGY

## 2023-07-25 PROCEDURE — 6360000002 HC RX W HCPCS: Performed by: RADIOLOGY

## 2023-07-25 PROCEDURE — C1717 BRACHYTX, NON-STR,HDR IR-192: HCPCS | Performed by: RADIOLOGY

## 2023-07-25 PROCEDURE — 77332 RADIATION TREATMENT AID(S): CPT | Performed by: RADIOLOGY

## 2023-07-25 RX ORDER — LORAZEPAM 2 MG/ML
1 INJECTION INTRAMUSCULAR ONCE
Status: COMPLETED | OUTPATIENT
Start: 2023-07-25 | End: 2023-07-25

## 2023-07-25 RX ORDER — ONDANSETRON 2 MG/ML
4 INJECTION INTRAMUSCULAR; INTRAVENOUS ONCE
Status: COMPLETED | OUTPATIENT
Start: 2023-07-25 | End: 2023-07-25

## 2023-07-25 RX ORDER — LIDOCAINE HYDROCHLORIDE 20 MG/ML
JELLY TOPICAL PRN
Status: DISCONTINUED | OUTPATIENT
Start: 2023-07-25 | End: 2023-07-26 | Stop reason: HOSPADM

## 2023-07-25 RX ORDER — GINSENG 100 MG
CAPSULE ORAL ONCE
Status: COMPLETED | OUTPATIENT
Start: 2023-07-25 | End: 2023-07-25

## 2023-07-25 RX ADMIN — HYDROMORPHONE HYDROCHLORIDE 1 MG: 1 INJECTION, SOLUTION INTRAMUSCULAR; INTRAVENOUS; SUBCUTANEOUS at 09:24

## 2023-07-25 RX ADMIN — LORAZEPAM 1 MG: 2 INJECTION INTRAMUSCULAR at 09:36

## 2023-07-25 RX ADMIN — BACITRACIN: 500 OINTMENT TOPICAL at 13:03

## 2023-07-25 RX ADMIN — ONDANSETRON 4 MG: 2 INJECTION INTRAMUSCULAR; INTRAVENOUS at 09:30

## 2023-07-25 RX ADMIN — LIDOCAINE HYDROCHLORIDE: 20 JELLY TOPICAL at 09:25

## 2023-07-25 ASSESSMENT — PAIN SCALES - GENERAL
PAINLEVEL_OUTOF10: 0
PAINLEVEL_OUTOF10: 2
PAINLEVEL_OUTOF10: 0

## 2023-07-25 ASSESSMENT — PAIN DESCRIPTION - LOCATION: LOCATION: PERINEUM

## 2023-07-26 ENCOUNTER — APPOINTMENT (OUTPATIENT)
Dept: RADIATION ONCOLOGY | Age: 63
End: 2023-07-26
Payer: COMMERCIAL

## 2023-07-26 PROCEDURE — 77336 RADIATION PHYSICS CONSULT: CPT | Performed by: RADIOLOGY

## 2023-07-27 ENCOUNTER — HOSPITAL ENCOUNTER (OUTPATIENT)
Dept: RADIATION ONCOLOGY | Age: 63
Discharge: HOME | End: 2023-07-27
Payer: COMMERCIAL

## 2023-07-27 VITALS
RESPIRATION RATE: 16 BRPM | SYSTOLIC BLOOD PRESSURE: 140 MMHG | OXYGEN SATURATION: 97 % | TEMPERATURE: 98.3 F | HEART RATE: 84 BPM | DIASTOLIC BLOOD PRESSURE: 78 MMHG

## 2023-07-27 PROCEDURE — 77771 HDR RDNCL NTRSTL/ICAV BRCHTX: CPT | Performed by: RADIOLOGY

## 2023-07-27 PROCEDURE — 6370000000 HC RX 637 (ALT 250 FOR IP): Performed by: RADIOLOGY

## 2023-07-27 PROCEDURE — 77332 RADIATION TREATMENT AID(S): CPT | Performed by: RADIOLOGY

## 2023-07-27 PROCEDURE — 77300 RADIATION THERAPY DOSE PLAN: CPT | Performed by: RADIOLOGY

## 2023-07-27 PROCEDURE — 77295 3-D RADIOTHERAPY PLAN: CPT | Performed by: RADIOLOGY

## 2023-07-27 PROCEDURE — 6360000002 HC RX W HCPCS: Performed by: RADIOLOGY

## 2023-07-27 PROCEDURE — 77280 THER RAD SIMULAJ FIELD SMPL: CPT | Performed by: RADIOLOGY

## 2023-07-27 PROCEDURE — C1717 BRACHYTX, NON-STR,HDR IR-192: HCPCS | Performed by: RADIOLOGY

## 2023-07-27 PROCEDURE — 57155 INSERT UTERI TANDEM/OVOIDS: CPT | Performed by: RADIOLOGY

## 2023-07-27 RX ORDER — ONDANSETRON 2 MG/ML
4 INJECTION INTRAMUSCULAR; INTRAVENOUS ONCE
Status: COMPLETED | OUTPATIENT
Start: 2023-07-27 | End: 2023-07-27

## 2023-07-27 RX ORDER — GINSENG 100 MG
CAPSULE ORAL ONCE
Status: COMPLETED | OUTPATIENT
Start: 2023-07-27 | End: 2023-07-27

## 2023-07-27 RX ORDER — LIDOCAINE HYDROCHLORIDE 20 MG/ML
JELLY TOPICAL PRN
Status: DISCONTINUED | OUTPATIENT
Start: 2023-07-27 | End: 2023-07-28 | Stop reason: HOSPADM

## 2023-07-27 RX ORDER — LORAZEPAM 2 MG/ML
1 INJECTION INTRAMUSCULAR ONCE
Status: COMPLETED | OUTPATIENT
Start: 2023-07-27 | End: 2023-07-27

## 2023-07-27 RX ADMIN — LIDOCAINE HYDROCHLORIDE: 20 JELLY TOPICAL at 09:18

## 2023-07-27 RX ADMIN — ONDANSETRON 4 MG: 2 INJECTION INTRAMUSCULAR; INTRAVENOUS at 09:19

## 2023-07-27 RX ADMIN — LORAZEPAM 1 MG: 2 INJECTION INTRAMUSCULAR; INTRAVENOUS at 09:22

## 2023-07-27 RX ADMIN — BACITRACIN: 500 OINTMENT TOPICAL at 09:18

## 2023-07-27 RX ADMIN — HYDROMORPHONE HYDROCHLORIDE 1 MG: 1 INJECTION, SOLUTION INTRAMUSCULAR; INTRAVENOUS; SUBCUTANEOUS at 09:23

## 2023-07-27 ASSESSMENT — PAIN SCALES - GENERAL
PAINLEVEL_OUTOF10: 0
PAINLEVEL_OUTOF10: 3
PAINLEVEL_OUTOF10: 0

## 2023-07-27 ASSESSMENT — PAIN DESCRIPTION - LOCATION: LOCATION: PERINEUM

## 2023-08-01 ENCOUNTER — HOSPITAL ENCOUNTER (OUTPATIENT)
Dept: RADIATION ONCOLOGY | Age: 63
Discharge: HOME | End: 2023-08-01
Payer: COMMERCIAL

## 2023-08-01 VITALS
TEMPERATURE: 99.1 F | OXYGEN SATURATION: 97 % | RESPIRATION RATE: 16 BRPM | HEART RATE: 81 BPM | SYSTOLIC BLOOD PRESSURE: 143 MMHG | DIASTOLIC BLOOD PRESSURE: 76 MMHG

## 2023-08-01 PROCEDURE — 77771 HDR RDNCL NTRSTL/ICAV BRCHTX: CPT | Performed by: RADIOLOGY

## 2023-08-01 PROCEDURE — 77280 THER RAD SIMULAJ FIELD SMPL: CPT | Performed by: RADIOLOGY

## 2023-08-01 PROCEDURE — 57155 INSERT UTERI TANDEM/OVOIDS: CPT | Performed by: RADIOLOGY

## 2023-08-01 PROCEDURE — C1717 BRACHYTX, NON-STR,HDR IR-192: HCPCS | Performed by: RADIOLOGY

## 2023-08-01 PROCEDURE — 6360000002 HC RX W HCPCS: Performed by: RADIOLOGY

## 2023-08-01 PROCEDURE — 6370000000 HC RX 637 (ALT 250 FOR IP): Performed by: RADIOLOGY

## 2023-08-01 RX ORDER — ONDANSETRON 2 MG/ML
4 INJECTION INTRAMUSCULAR; INTRAVENOUS ONCE
Status: COMPLETED | OUTPATIENT
Start: 2023-08-01 | End: 2023-08-01

## 2023-08-01 RX ORDER — LORAZEPAM 2 MG/ML
1 INJECTION INTRAMUSCULAR ONCE
Status: COMPLETED | OUTPATIENT
Start: 2023-08-01 | End: 2023-08-01

## 2023-08-01 RX ORDER — GINSENG 100 MG
CAPSULE ORAL ONCE
Status: COMPLETED | OUTPATIENT
Start: 2023-08-01 | End: 2023-08-01

## 2023-08-01 RX ORDER — LIDOCAINE HYDROCHLORIDE 20 MG/ML
JELLY TOPICAL PRN
Status: DISCONTINUED | OUTPATIENT
Start: 2023-08-01 | End: 2023-08-02 | Stop reason: HOSPADM

## 2023-08-01 RX ADMIN — LORAZEPAM 1 MG: 2 INJECTION INTRAMUSCULAR; INTRAVENOUS at 09:31

## 2023-08-01 RX ADMIN — ONDANSETRON 4 MG: 2 INJECTION INTRAMUSCULAR; INTRAVENOUS at 09:31

## 2023-08-01 RX ADMIN — BACITRACIN: 500 OINTMENT TOPICAL at 09:30

## 2023-08-01 RX ADMIN — HYDROMORPHONE HYDROCHLORIDE 1 MG: 1 INJECTION, SOLUTION INTRAMUSCULAR; INTRAVENOUS; SUBCUTANEOUS at 09:31

## 2023-08-01 RX ADMIN — LIDOCAINE HYDROCHLORIDE: 20 JELLY TOPICAL at 09:30

## 2023-08-01 ASSESSMENT — PAIN SCALES - GENERAL: PAINLEVEL_OUTOF10: 4

## 2023-08-01 ASSESSMENT — PAIN DESCRIPTION - LOCATION: LOCATION: BACK

## 2023-08-02 ENCOUNTER — APPOINTMENT (OUTPATIENT)
Dept: RADIATION ONCOLOGY | Age: 63
End: 2023-08-02
Payer: COMMERCIAL

## 2023-08-03 ENCOUNTER — HOSPITAL ENCOUNTER (OUTPATIENT)
Dept: RADIATION ONCOLOGY | Age: 63
Discharge: HOME | End: 2023-08-03
Payer: COMMERCIAL

## 2023-08-03 VITALS
DIASTOLIC BLOOD PRESSURE: 80 MMHG | TEMPERATURE: 99.1 F | OXYGEN SATURATION: 97 % | HEART RATE: 86 BPM | RESPIRATION RATE: 16 BRPM | SYSTOLIC BLOOD PRESSURE: 158 MMHG

## 2023-08-03 PROCEDURE — 6360000002 HC RX W HCPCS: Performed by: RADIOLOGY

## 2023-08-03 PROCEDURE — 77771 HDR RDNCL NTRSTL/ICAV BRCHTX: CPT | Performed by: RADIOLOGY

## 2023-08-03 PROCEDURE — C1717 BRACHYTX, NON-STR,HDR IR-192: HCPCS | Performed by: RADIOLOGY

## 2023-08-03 PROCEDURE — 57155 INSERT UTERI TANDEM/OVOIDS: CPT | Performed by: RADIOLOGY

## 2023-08-03 PROCEDURE — 6370000000 HC RX 637 (ALT 250 FOR IP): Performed by: RADIOLOGY

## 2023-08-03 PROCEDURE — 77280 THER RAD SIMULAJ FIELD SMPL: CPT | Performed by: RADIOLOGY

## 2023-08-03 RX ORDER — GINSENG 100 MG
CAPSULE ORAL ONCE
Status: COMPLETED | OUTPATIENT
Start: 2023-08-03 | End: 2023-08-03

## 2023-08-03 RX ORDER — ONDANSETRON 2 MG/ML
4 INJECTION INTRAMUSCULAR; INTRAVENOUS ONCE
Status: COMPLETED | OUTPATIENT
Start: 2023-08-03 | End: 2023-08-03

## 2023-08-03 RX ORDER — LIDOCAINE HYDROCHLORIDE 20 MG/ML
JELLY TOPICAL PRN
Status: DISCONTINUED | OUTPATIENT
Start: 2023-08-03 | End: 2023-08-04 | Stop reason: HOSPADM

## 2023-08-03 RX ORDER — LORAZEPAM 2 MG/ML
1 INJECTION INTRAMUSCULAR ONCE
Status: COMPLETED | OUTPATIENT
Start: 2023-08-03 | End: 2023-08-03

## 2023-08-03 RX ADMIN — ONDANSETRON 4 MG: 2 INJECTION INTRAMUSCULAR; INTRAVENOUS at 09:07

## 2023-08-03 RX ADMIN — HYDROMORPHONE HYDROCHLORIDE 1 MG: 1 INJECTION, SOLUTION INTRAMUSCULAR; INTRAVENOUS; SUBCUTANEOUS at 09:07

## 2023-08-03 RX ADMIN — BACITRACIN: 500 OINTMENT TOPICAL at 10:30

## 2023-08-03 RX ADMIN — BACITRACIN: 500 OINTMENT TOPICAL at 09:06

## 2023-08-03 RX ADMIN — LORAZEPAM 1 MG: 2 INJECTION INTRAMUSCULAR; INTRAVENOUS at 09:07

## 2023-08-03 RX ADMIN — LIDOCAINE HYDROCHLORIDE: 20 JELLY TOPICAL at 09:05

## 2023-08-03 RX ADMIN — HYDROMORPHONE HYDROCHLORIDE 1 MG: 1 INJECTION, SOLUTION INTRAMUSCULAR; INTRAVENOUS; SUBCUTANEOUS at 11:08

## 2023-08-03 ASSESSMENT — PAIN SCALES - GENERAL: PAINLEVEL_OUTOF10: 6

## 2023-08-03 NOTE — PROGRESS NOTES
Leah Gonzalez  8/3/2023      There were no vitals taken for this visit. Pain Score: 0    Fatigue: Moderate    Mucous Membrane Alteration  Drainage: No  Drainage Odor: No  Vaginal Bleeding: No    Elimination Alterations  Constipation: no  Diarrhea:  no  Urinary Frequency/Urgency: No  Urinary Retention: No  Dysuria: No  Urinary Incontinence: No  Proctitis: No    Skin Alteration   Sensation:  Radiation Dermatitis:  None    Procedure time out completed Yes    Patient seen today for 4th HDR treatment. Medications administered per orders, see M.A.R. for further documentation. Hoff placed per protocol and pt tolerated well. Dr. Sharyle Mascot in room and pelvic exam started. Equipment placed per MD, pt tolerated well. SIM scan completed, pt pulled to stretcher while equipment was held in place. VS checked per protocol.      Eliel Woods RN  8/3/2023  9:26 AM

## 2023-08-04 ENCOUNTER — TELEPHONE (OUTPATIENT)
Dept: RADIATION ONCOLOGY | Age: 63
End: 2023-08-04

## 2023-08-04 DIAGNOSIS — C53.0 MALIGNANT NEOPLASM OF ENDOCERVIX (HCC): Primary | ICD-10-CM

## 2023-08-04 RX ORDER — SULFAMETHOXAZOLE AND TRIMETHOPRIM 800; 160 MG/1; MG/1
1 TABLET ORAL 2 TIMES DAILY
Qty: 14 TABLET | Refills: 0 | Status: SHIPPED | OUTPATIENT
Start: 2023-08-04 | End: 2023-08-11

## 2023-08-04 NOTE — TELEPHONE ENCOUNTER
Left message for pt to update her that antibiotic was prescribed for port infection and that Dr. Evans Sheehan and Dr. Eden Wright spoke and Dr. Evans Sheehan said it would be ok for her port to be removed. Patient verbalized understanding.

## 2023-08-08 ENCOUNTER — HOSPITAL ENCOUNTER (OUTPATIENT)
Dept: RADIATION ONCOLOGY | Age: 63
Discharge: HOME | End: 2023-08-08
Payer: COMMERCIAL

## 2023-08-08 VITALS
SYSTOLIC BLOOD PRESSURE: 137 MMHG | TEMPERATURE: 98.8 F | DIASTOLIC BLOOD PRESSURE: 72 MMHG | OXYGEN SATURATION: 97 % | HEART RATE: 100 BPM | RESPIRATION RATE: 16 BRPM

## 2023-08-08 PROCEDURE — 77771 HDR RDNCL NTRSTL/ICAV BRCHTX: CPT | Performed by: RADIOLOGY

## 2023-08-08 PROCEDURE — 77295 3-D RADIOTHERAPY PLAN: CPT | Performed by: RADIOLOGY

## 2023-08-08 PROCEDURE — 77300 RADIATION THERAPY DOSE PLAN: CPT | Performed by: RADIOLOGY

## 2023-08-08 PROCEDURE — 6360000002 HC RX W HCPCS: Performed by: RADIOLOGY

## 2023-08-08 PROCEDURE — 6370000000 HC RX 637 (ALT 250 FOR IP): Performed by: RADIOLOGY

## 2023-08-08 PROCEDURE — 77280 THER RAD SIMULAJ FIELD SMPL: CPT | Performed by: RADIOLOGY

## 2023-08-08 PROCEDURE — 57155 INSERT UTERI TANDEM/OVOIDS: CPT | Performed by: RADIOLOGY

## 2023-08-08 PROCEDURE — C1717 BRACHYTX, NON-STR,HDR IR-192: HCPCS | Performed by: RADIOLOGY

## 2023-08-08 RX ORDER — LIDOCAINE HYDROCHLORIDE 20 MG/ML
JELLY TOPICAL PRN
Status: DISCONTINUED | OUTPATIENT
Start: 2023-08-08 | End: 2023-08-09 | Stop reason: HOSPADM

## 2023-08-08 RX ORDER — GINSENG 100 MG
CAPSULE ORAL PRN
Status: DISCONTINUED | OUTPATIENT
Start: 2023-08-08 | End: 2023-08-09 | Stop reason: HOSPADM

## 2023-08-08 RX ORDER — LORAZEPAM 2 MG/ML
1 INJECTION INTRAMUSCULAR ONCE
Status: COMPLETED | OUTPATIENT
Start: 2023-08-08 | End: 2023-08-08

## 2023-08-08 RX ORDER — ONDANSETRON 2 MG/ML
4 INJECTION INTRAMUSCULAR; INTRAVENOUS ONCE
Status: COMPLETED | OUTPATIENT
Start: 2023-08-08 | End: 2023-08-08

## 2023-08-08 RX ADMIN — LIDOCAINE HYDROCHLORIDE: 20 JELLY TOPICAL at 09:42

## 2023-08-08 RX ADMIN — HYDROMORPHONE HYDROCHLORIDE 1 MG: 1 INJECTION, SOLUTION INTRAMUSCULAR; INTRAVENOUS; SUBCUTANEOUS at 09:20

## 2023-08-08 RX ADMIN — LORAZEPAM 1 MG: 2 INJECTION INTRAMUSCULAR; INTRAVENOUS at 09:22

## 2023-08-08 RX ADMIN — BACITRACIN: 500 OINTMENT TOPICAL at 09:42

## 2023-08-08 RX ADMIN — LIDOCAINE HYDROCHLORIDE: 20 JELLY TOPICAL at 09:26

## 2023-08-08 RX ADMIN — ONDANSETRON 4 MG: 2 INJECTION INTRAMUSCULAR; INTRAVENOUS at 09:24

## 2023-08-08 RX ADMIN — BACITRACIN: 500 OINTMENT TOPICAL at 09:21

## 2023-08-08 ASSESSMENT — PAIN SCALES - GENERAL
PAINLEVEL_OUTOF10: 2
PAINLEVEL_OUTOF10: 1
PAINLEVEL_OUTOF10: 0
PAINLEVEL_OUTOF10: 2
PAINLEVEL_OUTOF10: 1
PAINLEVEL_OUTOF10: 2
PAINLEVEL_OUTOF10: 2
PAINLEVEL_OUTOF10: 4

## 2023-08-08 ASSESSMENT — PAIN DESCRIPTION - LOCATION
LOCATION: VAGINA

## 2023-08-08 ASSESSMENT — PAIN DESCRIPTION - DESCRIPTORS
DESCRIPTORS: CRAMPING

## 2023-08-10 RX ORDER — SODIUM CHLORIDE 9 MG/ML
25 INJECTION, SOLUTION INTRAVENOUS PRN
Status: CANCELLED | OUTPATIENT
Start: 2023-08-10

## 2023-08-11 ENCOUNTER — OFFICE VISIT (OUTPATIENT)
Dept: ONCOLOGY | Age: 63
End: 2023-08-11
Payer: COMMERCIAL

## 2023-08-11 ENCOUNTER — TELEPHONE (OUTPATIENT)
Dept: ONCOLOGY | Age: 63
End: 2023-08-11

## 2023-08-11 ENCOUNTER — HOSPITAL ENCOUNTER (OUTPATIENT)
Dept: INFUSION THERAPY | Age: 63
Discharge: HOME | End: 2023-08-11
Payer: COMMERCIAL

## 2023-08-11 ENCOUNTER — CLINICAL DOCUMENTATION (OUTPATIENT)
Dept: RADIATION ONCOLOGY | Age: 63
End: 2023-08-11

## 2023-08-11 VITALS
BODY MASS INDEX: 36.48 KG/M2 | TEMPERATURE: 97.8 F | SYSTOLIC BLOOD PRESSURE: 133 MMHG | WEIGHT: 202.7 LBS | HEART RATE: 102 BPM | DIASTOLIC BLOOD PRESSURE: 85 MMHG

## 2023-08-11 DIAGNOSIS — E83.52 HYPERCALCEMIA: ICD-10-CM

## 2023-08-11 DIAGNOSIS — Z51.11 CHEMOTHERAPY MANAGEMENT, ENCOUNTER FOR: ICD-10-CM

## 2023-08-11 DIAGNOSIS — C53.8 MALIGNANT NEOPLASM OF OVERLAPPING SITES OF CERVIX UTERI (HCC): ICD-10-CM

## 2023-08-11 DIAGNOSIS — C53.9 MALIGNANT NEOPLASM OF CERVIX, UNSPECIFIED SITE (HCC): Primary | ICD-10-CM

## 2023-08-11 LAB
ALBUMIN SERPL-MCNC: 4 G/DL (ref 3.5–5.2)
ALBUMIN/GLOB SERPL: 1.3 {RATIO} (ref 1–2.5)
ALP SERPL-CCNC: 114 U/L (ref 35–104)
ALT SERPL-CCNC: 13 U/L (ref 5–33)
ANION GAP SERPL CALCULATED.3IONS-SCNC: 10 MMOL/L (ref 9–17)
AST SERPL-CCNC: 18 U/L
BASOPHILS # BLD: 0.06 K/UL (ref 0–0.2)
BASOPHILS NFR BLD: 1 % (ref 0–2)
BILIRUB SERPL-MCNC: 0.3 MG/DL (ref 0.3–1.2)
BUN SERPL-MCNC: 10 MG/DL (ref 8–23)
CALCIUM SERPL-MCNC: 8.7 MG/DL (ref 8.6–10.4)
CHLORIDE SERPL-SCNC: 100 MMOL/L (ref 98–107)
CO2 SERPL-SCNC: 22 MMOL/L (ref 20–31)
CREAT SERPL-MCNC: 1.1 MG/DL (ref 0.5–0.9)
EOSINOPHIL # BLD: 0.17 K/UL (ref 0–0.4)
EOSINOPHILS RELATIVE PERCENT: 3 % (ref 1–4)
ERYTHROCYTE [DISTWIDTH] IN BLOOD BY AUTOMATED COUNT: 21.5 % (ref 12.5–15.4)
GFR SERPL CREATININE-BSD FRML MDRD: 57 ML/MIN/1.73M2
GLUCOSE SERPL-MCNC: 157 MG/DL (ref 70–99)
HCT VFR BLD AUTO: 25.9 % (ref 36–46)
HGB BLD-MCNC: 9 G/DL (ref 12–16)
LYMPHOCYTES NFR BLD: 0.75 K/UL (ref 1–4.8)
LYMPHOCYTES RELATIVE PERCENT: 13 % (ref 24–44)
MCH RBC QN AUTO: 33.1 PG (ref 26–34)
MCHC RBC AUTO-ENTMCNC: 34.9 G/DL (ref 31–37)
MCV RBC AUTO: 94.8 FL (ref 80–100)
MONOCYTES NFR BLD: 0.7 K/UL (ref 0.1–0.8)
MONOCYTES NFR BLD: 12 % (ref 1–7)
MORPHOLOGY: ABNORMAL
NEUTROPHILS NFR BLD: 71 % (ref 36–66)
NEUTS SEG NFR BLD: 4.12 K/UL (ref 1.8–7.7)
PLATELET # BLD AUTO: 161 K/UL (ref 140–450)
PMV BLD AUTO: 8.7 FL (ref 6–12)
POTASSIUM SERPL-SCNC: 4.2 MMOL/L (ref 3.7–5.3)
PROT SERPL-MCNC: 7.2 G/DL (ref 6.4–8.3)
RBC # BLD AUTO: 2.73 M/UL (ref 4–5.2)
SODIUM SERPL-SCNC: 132 MMOL/L (ref 135–144)
WBC OTHER # BLD: 5.8 K/UL (ref 3.5–11)

## 2023-08-11 PROCEDURE — 2580000003 HC RX 258: Performed by: INTERNAL MEDICINE

## 2023-08-11 PROCEDURE — 85025 COMPLETE CBC W/AUTO DIFF WBC: CPT

## 2023-08-11 PROCEDURE — 99214 OFFICE O/P EST MOD 30 MIN: CPT | Performed by: INTERNAL MEDICINE

## 2023-08-11 PROCEDURE — 99211 OFF/OP EST MAY X REQ PHY/QHP: CPT | Performed by: INTERNAL MEDICINE

## 2023-08-11 PROCEDURE — 6360000002 HC RX W HCPCS: Performed by: INTERNAL MEDICINE

## 2023-08-11 PROCEDURE — 3079F DIAST BP 80-89 MM HG: CPT | Performed by: INTERNAL MEDICINE

## 2023-08-11 PROCEDURE — 36591 DRAW BLOOD OFF VENOUS DEVICE: CPT

## 2023-08-11 PROCEDURE — 3075F SYST BP GE 130 - 139MM HG: CPT | Performed by: INTERNAL MEDICINE

## 2023-08-11 PROCEDURE — 80053 COMPREHEN METABOLIC PANEL: CPT

## 2023-08-11 RX ORDER — ALPRAZOLAM 0.5 MG/1
0.5 TABLET ORAL 2 TIMES DAILY PRN
Qty: 20 TABLET | Refills: 0 | Status: SHIPPED | OUTPATIENT
Start: 2023-08-11 | End: 2023-08-21

## 2023-08-11 RX ORDER — HEPARIN 100 UNIT/ML
500 SYRINGE INTRAVENOUS PRN
OUTPATIENT
Start: 2023-08-11

## 2023-08-11 RX ORDER — HEPARIN 100 UNIT/ML
500 SYRINGE INTRAVENOUS PRN
Status: DISCONTINUED | OUTPATIENT
Start: 2023-08-11 | End: 2023-08-12 | Stop reason: HOSPADM

## 2023-08-11 RX ORDER — SODIUM CHLORIDE 9 MG/ML
25 INJECTION, SOLUTION INTRAVENOUS PRN
OUTPATIENT
Start: 2023-08-11

## 2023-08-11 RX ORDER — SODIUM CHLORIDE 0.9 % (FLUSH) 0.9 %
5-40 SYRINGE (ML) INJECTION PRN
OUTPATIENT
Start: 2023-08-11

## 2023-08-11 RX ORDER — SODIUM CHLORIDE 0.9 % (FLUSH) 0.9 %
5-40 SYRINGE (ML) INJECTION PRN
Status: DISCONTINUED | OUTPATIENT
Start: 2023-08-11 | End: 2023-08-12 | Stop reason: HOSPADM

## 2023-08-11 RX ORDER — ALPRAZOLAM 0.5 MG/1
0.5 TABLET ORAL NIGHTLY PRN
COMMUNITY

## 2023-08-11 RX ADMIN — SODIUM CHLORIDE, PRESERVATIVE FREE 20 ML: 5 INJECTION INTRAVENOUS at 14:18

## 2023-08-11 RX ADMIN — HEPARIN 500 UNITS: 100 SYRINGE at 14:19

## 2023-08-11 NOTE — PROGRESS NOTES
HGB 9.0*          BMP:    Recent Labs     08/11/23  1422   *   K 4.2      CO2 22   BUN 10   CREATININE 1.1*   GLUCOSE 157*         Hepatic:   Recent Labs     08/11/23  1422   AST 18   ALT 13   BILITOT 0.3   ALKPHOS 114*         Results for orders placed or performed during the hospital encounter of 08/11/23   CBC with Auto Differential   Result Value Ref Range    WBC 5.8 3.5 - 11.0 k/uL    RBC 2.73 (L) 4.0 - 5.2 m/uL    Hemoglobin 9.0 (L) 12.0 - 16.0 g/dL    Hematocrit 25.9 (L) 36 - 46 %    MCV 94.8 80 - 100 fL    MCH 33.1 26 - 34 pg    MCHC 34.9 31 - 37 g/dL    RDW 21.5 (H) 12.5 - 15.4 %    Platelets 830 350 - 153 k/uL    MPV 8.7 6.0 - 12.0 fL    Neutrophils % PENDING %    Lymphocytes % PENDING %    Monocytes % PENDING %    Eosinophils % PENDING %    Basophils % PENDING %    Neutrophils Absolute PENDING k/uL    Lymphocytes Absolute PENDING k/uL    Monocytes Absolute PENDING k/uL    Eosinophils Absolute PENDING k/uL    Basophils Absolute PENDING 0.0 - 0.2 k/uL   Comprehensive Metabolic Panel   Result Value Ref Range    Glucose 157 (H) 70 - 99 mg/dL    BUN 10 8 - 23 mg/dL    Creatinine 1.1 (H) 0.5 - 0.9 mg/dL    Est, Glom Filt Rate 57 (L) >60 mL/min/1.73m2    Calcium 8.7 8.6 - 10.4 mg/dL    Sodium 132 (L) 135 - 144 mmol/L    Potassium 4.2 3.7 - 5.3 mmol/L    Chloride 100 98 - 107 mmol/L    CO2 22 20 - 31 mmol/L    Anion Gap 10 9 - 17 mmol/L    Alkaline Phosphatase 114 (H) 35 - 104 U/L    ALT 13 5 - 33 U/L    AST 18 <32 U/L    Total Bilirubin 0.3 0.3 - 1.2 mg/dL    Total Protein 7.2 6.4 - 8.3 g/dL    Albumin 4.0 3.5 - 5.2 g/dL    Albumin/Globulin Ratio 1.3 1.0 - 2.5   MRI PELVIS W WO CONTRAST    Result Date: 7/25/2023  EXAMINATION: MRI OF THE PELVIS WITHOUT AND WITH CONTRAST, 7/21/2023 11:04 am TECHNIQUE: Multiplanar multisequence MRI of the pelvis was performed without and with the administration of intravenous contrast. COMPARISON: 05/26/2023 HISTORY: ORDERING SYSTEM PROVIDED HISTORY: Malignant

## 2023-08-11 NOTE — PROGRESS NOTES
[address] [facility]            Radiation Oncology          [address]          [place], [facility]        Zaynab Lutzens: [phone]        F: [phone]       [place]           Dear Dr Reid ref. provider found: Thank you for referring Zuleyma Grigsby I Nail to me for evaluation and treatment. Below is a summary of the patient's recently completed radiation course. If you have questions, please do not hesitate to call me. I look forward to following this patient along with you. Sincerely,  Electronically signed by Joshua Holly MD on 2023 at 1:07 PM EDT      CC: Patient Care Team:  Shawn Stanton DO as PCP - General (Family Medicine)  Shawn Stanton DO as PCP - Empaneled Provider  Mac Morales RN as Nurse Navigator (Oncology)  ------------------------------------------------------------------------------------------------------------------------------------------------------------------------------------------        Date of Service: 2023     Location:  [facility] Radiation Oncology,   [address], [place], [address] [facility]   [phone]        RADIATION ONCOLOGY END OF TREATMENT SUMMARY:    Patient ID:   Chance Briceno  : 1960   MRN: [id number]    DIAGNOSIS:  Cancer Staging   Malignant neoplasm of overlapping sites of cervix uteri [facility])  Staging form: Cervix Uteri, AJCC Version 9  - Clinical stage from 2023: FIGO Stage IIIC1 (cT1b3, cN1a, cM0) - Signed by Adamaris Koenig MD on 2023      TREATMENT DETAILS:          Concurrent Chemotherapy: Weekly Cisplatin  Treatment dates: 2023 to 2023    CLINICAL COURSE:    ECOG 0    Patient completed the treatment as prescribed and tolerated treatment as expected. Patient developed fatigue, diarrhea, abdominal pain, mild dysuria, and symptoms were managed with supportive care.      Toward the end of her course of external beam radiation therapy, patient had a Baldomero sleeve placed by GYN

## 2023-08-11 NOTE — TELEPHONE ENCOUNTER
AVS from 8/11/23      Port removal per ir      Ct pet in 10 weeks      Rv after ct pet with labs couple of day sprior       RV scheduled for 10/20/23 @12:45pm    Pt to schedule pet scan    Emailed ir for port removal    Pt will have labs drawn one week prior to RV    PT was given AVS and appt schedule    Electronically signed by Jennifer Simeon on 8/11/2023 at 3:20 PM

## 2023-08-11 NOTE — TELEPHONE ENCOUNTER
Name: Ramon Reyes  : 1960  MRN: [id number]    Oncology Navigation Follow-Up Note    Contact Type:  Medical Oncology    Notes: Pt @ [facility] for Dr. Mela Merlin f/u. Met w/pt & pt's daughter prior to f/u. Pt inquired on [address] [facility] financial assistance. Per pt approved for partial assistance & unsure of amount owed. Encouraged pt to contact [facility] . Pt verbalized understanding & confirmed had Russel's contact #. Pt denied further questions/concerns. Encouraged to contact writer prn. Will continue to follow.     Electronically signed by Kelby Erickson RN on 2023 at 2:43 PM

## 2023-08-17 ENCOUNTER — OFFICE VISIT (OUTPATIENT)
Dept: GYNECOLOGIC ONCOLOGY | Age: 63
End: 2023-08-17
Payer: COMMERCIAL

## 2023-08-17 VITALS
BODY MASS INDEX: 37.28 KG/M2 | WEIGHT: 202.6 LBS | OXYGEN SATURATION: 97 % | TEMPERATURE: 97.9 F | HEIGHT: 62 IN | HEART RATE: 89 BPM | DIASTOLIC BLOOD PRESSURE: 88 MMHG | SYSTOLIC BLOOD PRESSURE: 138 MMHG

## 2023-08-17 DIAGNOSIS — C53.1 MALIGNANT NEOPLASM OF EXOCERVIX (HCC): Primary | ICD-10-CM

## 2023-08-17 PROCEDURE — 99214 OFFICE O/P EST MOD 30 MIN: CPT | Performed by: PHYSICIAN ASSISTANT

## 2023-08-17 PROCEDURE — 3075F SYST BP GE 130 - 139MM HG: CPT | Performed by: PHYSICIAN ASSISTANT

## 2023-08-17 PROCEDURE — 3079F DIAST BP 80-89 MM HG: CPT | Performed by: PHYSICIAN ASSISTANT

## 2023-08-17 ASSESSMENT — ENCOUNTER SYMPTOMS
RESPIRATORY NEGATIVE: 1
GASTROINTESTINAL NEGATIVE: 1

## 2023-08-17 NOTE — PROGRESS NOTES
[address] [facility], [place] [address], [address]  [address] [facility] [address]    Terrance Martinez is a 65 y.o. female who presents for a follow up for cervical cancer. Chief Complaint:  Cervical cancer    HPI: Patient is a -year-old postmenopausal female who was found to have concerns of vaginal bleeding and presented to her PCP with these concerns. The patient reported vaginal bleeding beginning in 2023. Her gynecologic history is pertinent for menarche age 16, menopause age 48. Her last pelvic exam with Pap smear on file in  revealed low-grade TRUPTI, ASC-H, HPV negative. She was counseled to repeat Pap smear in 1 year. Patient was seen by [address] [place] [address] gynecologic oncology in May 2023. Pelvic examination revealed a cervical mass measuring up to 4.8 cm. Tissue biopsies obtained which later returned a well differentiated invasive squamous cell carcinoma of the cervix. Patient was clinically suspicious for at least a stage IB 3 cervical cancer. PET CT scan was completed which revealed the known FDG activity at the level of the cervix. 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 No FDG activity in the chest.  Therefore patient was clinically stage FIGO IIIC1 disease. Recommended neoadjuvant chemoradiation therapy. Molecular testing through [facility] [place] reveals PDL positive. She has completed 4500 cGy over 25 fractions to the pelvis with weekly cisplatin followed by 2850 cGy intracavitary brachytherapy over 5 fractions. Radiation therapy course from  with completion on 2023. Patient was recently seen by medical oncology, concern for port infection. She is scheduled to have port removal due to this concern. She is scheduled for follow-up PET CT scan for response to treatment in 2023. Today, she is doing well. She is 1 week post radiation therapy. has no abdominal or pelvic pain.  No vaginal bleeding or

## 2023-08-17 NOTE — PROGRESS NOTES
Review of Systems   Constitutional: Negative. HENT:   Negative for lump/mass. Respiratory: Negative. Cardiovascular: Negative. Gastrointestinal: Negative. Genitourinary: Negative. Musculoskeletal: Negative. Skin: Negative. Neurological: Negative.

## 2023-08-21 ENCOUNTER — HOSPITAL ENCOUNTER (OUTPATIENT)
Dept: INTERVENTIONAL RADIOLOGY/VASCULAR | Age: 63
Discharge: HOME | End: 2023-08-23
Payer: COMMERCIAL

## 2023-08-21 ENCOUNTER — TELEPHONE (OUTPATIENT)
Dept: ONCOLOGY | Age: 63
End: 2023-08-21

## 2023-08-21 VITALS
RESPIRATION RATE: 17 BRPM | HEART RATE: 94 BPM | DIASTOLIC BLOOD PRESSURE: 94 MMHG | OXYGEN SATURATION: 96 % | SYSTOLIC BLOOD PRESSURE: 170 MMHG

## 2023-08-21 DIAGNOSIS — C53.9 MALIGNANT NEOPLASM OF CERVIX, UNSPECIFIED SITE (HCC): ICD-10-CM

## 2023-08-21 PROCEDURE — 87205 SMEAR GRAM STAIN: CPT

## 2023-08-21 PROCEDURE — 2709999900 IR REMOVE TUNNELED CVAD W SQ PORT/PUMP INSERT

## 2023-08-21 PROCEDURE — 87070 CULTURE OTHR SPECIMN AEROBIC: CPT

## 2023-08-21 PROCEDURE — 36590 REMOVAL TUNNELED CV CATH: CPT

## 2023-08-21 PROCEDURE — 32555 ASPIRATE PLEURA W/ IMAGING: CPT

## 2023-08-21 PROCEDURE — 77001 FLUOROGUIDE FOR VEIN DEVICE: CPT

## 2023-08-21 RX ORDER — ACETAMINOPHEN 325 MG/1
650 TABLET ORAL EVERY 4 HOURS PRN
Status: DISCONTINUED | OUTPATIENT
Start: 2023-08-21 | End: 2023-08-24 | Stop reason: HOSPADM

## 2023-08-21 NOTE — BRIEF OP NOTE
Brief Postoperative Note    Sotero Gonzalez  YOB: 1960  [id number]    Pre-operative Diagnosis: Rt IJ port; finished chemotherapy    Post-operative Diagnosis: Same    Procedure: Rt IJ port removal    Medications Given: none    Anesthesia: Local    Surgeons/Assistants: Josue Franklin MD    Estimated Blood Loss: minimal    Complications: none    Specimens: were not obtained    Findings: Rt IJ port removed without incident. Pt tolerated well. Port pocket appeared unremarkable without signs infection. Closed in the std fashion.        Electronically signed by Josue Franklin MD on 8/21/2023 at 12:46 PM

## 2023-08-21 NOTE — TELEPHONE ENCOUNTER
Patient reached out stating she has new bill from [facility] and is unsure if she should pay it.  encouraged patient to reach out to [employer] to check status of her application before making any payments. Number provided.

## 2023-08-23 LAB
MICROORGANISM SPEC CULT: NO GROWTH
MICROORGANISM/AGENT SPEC: NORMAL
MICROORGANISM/AGENT SPEC: NORMAL
SPECIMEN DESCRIPTION: NORMAL

## 2023-09-12 ENCOUNTER — HOSPITAL ENCOUNTER (OUTPATIENT)
Dept: RADIATION ONCOLOGY | Age: 63
Discharge: HOME | End: 2023-09-12
Payer: COMMERCIAL

## 2023-09-12 VITALS
WEIGHT: 205.4 LBS | SYSTOLIC BLOOD PRESSURE: 154 MMHG | BODY MASS INDEX: 37.57 KG/M2 | HEART RATE: 94 BPM | DIASTOLIC BLOOD PRESSURE: 92 MMHG | OXYGEN SATURATION: 96 % | TEMPERATURE: 98 F | RESPIRATION RATE: 16 BRPM

## 2023-09-12 PROCEDURE — 99212 OFFICE O/P EST SF 10 MIN: CPT | Performed by: RADIOLOGY

## 2023-09-12 NOTE — PROGRESS NOTES
[facility]            Radiation Oncology          [facility], [address] [facility] [place]: [phone]        F: [phone]       [email or web address]           Date of Service: 2023     Location:  [address],   [facility] [place], [address] [place] [address]   [phone]       RADIATION ONCOLOGY FOLLOW UP NOTE    Patient ID:   Crystal Eagle  : 1960   MRN: [id number]    DIAGNOSIS:  Cancer Staging   Malignant neoplasm of overlapping sites of cervix uteri [facility])  Staging form: Cervix Uteri, AJCC Version 9  - Clinical stage from 2023: FIGO Stage IIIC1 (cT1b3, cN1a, cM0) - Signed by Rosa Duval MD on 2023        INTERVAL HISTORY:   Crystal Eagle is a 65 y.o. Darol Cocker female with a diagnosis of squamous cell carcinoma of the cervix FIGO stage III C1. Patient was treated with definitive concurrent cisplatin and radiation therapy. She received 4500 cGy to the pelvis with simultaneous integrated boost to 5500 cGy to the enlarged lymph nodes. This was then followed by intracavitary brachytherapy boost using tandem and ring. Her radiation therapy was completed on 2023. She presents today for her first posttreatment follow-up. She is doing well and recovering as expected. Her energy is improving. Her appetite is improving. She denies abdominal pain, nausea, vomiting, diarrhea. She denies dysuria or bleeding with urination. She denies vaginal discharge. She has a pending posttreatment PET scan to be completed next month.       MEDICATIONS:    Current Outpatient Medications:     ALPRAZolam (XANAX) 0.5 MG tablet, Take 1 tablet by mouth nightly as needed for Sleep., Disp: , Rfl:     lidocaine-prilocaine (EMLA) 2.5-2.5 % cream, Apply topically as needed daily to port sight., Disp: 1 each, Rfl: 2    BLACK CURRANT SEED OIL PO, Take by mouth 1 teaspoon daily, Disp: , Rfl:     lisinopril (PRINIVIL;ZESTRIL) 20 MG tablet, Take 1

## 2023-10-04 ENCOUNTER — TELEPHONE (OUTPATIENT)
Dept: ONCOLOGY | Age: 63
End: 2023-10-04

## 2023-10-04 NOTE — TELEPHONE ENCOUNTER
Patient called stating she had received bill that was before [facility] approved.  encouraged patient to reach out to [facility] to see if she can update date of service. Patient called back stating phone number to [facility] no longer working. She states she spoke with billing who states no number to department available.  called number previously used and number leads to bill payment prompts.  will resubmit patient's application with updated date of services.

## 2023-10-04 NOTE — TELEPHONE ENCOUNTER
Name: Hernan Pérez  : 1960  MRN: [id number]    Oncology Navigation Follow-Up Note    Contact Type:  Telephone    Notes: Spoke w/pt for f/u call. Pt stated received bill from Dr. Jeana Raymundo office, approved for [id number] [facility] financial assistance, contacted [address] [facility] billing, & instructed to resubmit [address] In[facility]ed [facility] assistance. Pt confirmed Russel's contact #. Pt denied further questions/concerns. Pt confirmed 10/10 PET/CT scan, 10/12 Dr. Mallory Queen f/u, & 10/20 Dr. Oneida Stahl f/u. Encouraged to contact writer prn. Will continue to follow.      Electronically signed by Louise Scott RN on 10/4/2023 at 11:25 AM

## 2023-10-06 ENCOUNTER — TELEPHONE (OUTPATIENT)
Dept: ONCOLOGY | Age: 63
End: 2023-10-06

## 2023-10-10 ENCOUNTER — HOSPITAL ENCOUNTER (OUTPATIENT)
Dept: NUCLEAR MEDICINE | Age: 63
Discharge: HOME | End: 2023-10-12
Attending: INTERNAL MEDICINE
Payer: COMMERCIAL

## 2023-10-10 VITALS — WEIGHT: 207 LBS | BODY MASS INDEX: 37.86 KG/M2

## 2023-10-10 DIAGNOSIS — Z51.11 CHEMOTHERAPY MANAGEMENT, ENCOUNTER FOR: ICD-10-CM

## 2023-10-10 DIAGNOSIS — C53.9 MALIGNANT NEOPLASM OF CERVIX, UNSPECIFIED SITE (HCC): ICD-10-CM

## 2023-10-10 DIAGNOSIS — E83.52 HYPERCALCEMIA: ICD-10-CM

## 2023-10-10 LAB — GLUCOSE BLD-MCNC: 149 MG/DL (ref 65–105)

## 2023-10-10 PROCEDURE — 82947 ASSAY GLUCOSE BLOOD QUANT: CPT

## 2023-10-10 PROCEDURE — 3430000000 HC RX DIAGNOSTIC RADIOPHARMACEUTICAL: Performed by: INTERNAL MEDICINE

## 2023-10-10 PROCEDURE — 78815 PET IMAGE W/CT SKULL-THIGH: CPT

## 2023-10-10 PROCEDURE — 2580000003 HC RX 258: Performed by: INTERNAL MEDICINE

## 2023-10-10 PROCEDURE — A9552 F18 FDG: HCPCS | Performed by: INTERNAL MEDICINE

## 2023-10-10 RX ORDER — FLUDEOXYGLUCOSE F 18 200 MCI/ML
10 INJECTION, SOLUTION INTRAVENOUS
Status: COMPLETED | OUTPATIENT
Start: 2023-10-10 | End: 2023-10-10

## 2023-10-10 RX ORDER — SODIUM CHLORIDE 0.9 % (FLUSH) 0.9 %
10 SYRINGE (ML) INJECTION
Status: COMPLETED | OUTPATIENT
Start: 2023-10-10 | End: 2023-10-10

## 2023-10-10 RX ADMIN — SODIUM CHLORIDE, PRESERVATIVE FREE 10 ML: 5 INJECTION INTRAVENOUS at 09:45

## 2023-10-10 RX ADMIN — FLUDEOXYGLUCOSE F 18 12.07 MILLICURIE: 200 INJECTION, SOLUTION INTRAVENOUS at 09:45

## 2023-10-12 ENCOUNTER — OFFICE VISIT (OUTPATIENT)
Dept: GYNECOLOGIC ONCOLOGY | Age: 63
End: 2023-10-12

## 2023-10-12 VITALS
BODY MASS INDEX: 37.8 KG/M2 | HEART RATE: 84 BPM | HEIGHT: 62 IN | SYSTOLIC BLOOD PRESSURE: 175 MMHG | WEIGHT: 205.4 LBS | OXYGEN SATURATION: 97 % | DIASTOLIC BLOOD PRESSURE: 96 MMHG

## 2023-10-12 DIAGNOSIS — C53.8 MALIGNANT NEOPLASM OF OVERLAPPING SITES OF CERVIX UTERI (HCC): Primary | ICD-10-CM

## 2023-10-12 DIAGNOSIS — Z92.3 HISTORY OF RADIATION THERAPY: ICD-10-CM

## 2023-10-12 DIAGNOSIS — N28.9 RENAL INSUFFICIENCY: ICD-10-CM

## 2023-10-12 DIAGNOSIS — S32.10XA CLOSED FRACTURE OF SACRUM, UNSPECIFIED PORTION OF SACRUM, INITIAL ENCOUNTER (HCC): ICD-10-CM

## 2023-10-12 DIAGNOSIS — E87.1 HYPONATREMIA: ICD-10-CM

## 2023-10-12 DIAGNOSIS — E83.42 HYPOMAGNESEMIA: ICD-10-CM

## 2023-10-12 DIAGNOSIS — Z92.21 HISTORY OF CHEMOTHERAPY: ICD-10-CM

## 2023-10-12 DIAGNOSIS — D64.9 ANEMIA, UNSPECIFIED TYPE: ICD-10-CM

## 2023-10-12 DIAGNOSIS — Z00.00 HEALTHCARE MAINTENANCE: ICD-10-CM

## 2023-10-12 NOTE — PROGRESS NOTES
Review of Systems   Constitutional: Negative.    HENT:  Negative.     Eyes: Negative.    Respiratory: Negative.     Cardiovascular: Negative.    Gastrointestinal: Negative.    Endocrine: Negative.    Genitourinary: Negative.     Musculoskeletal: Negative.    Skin: Negative.    Neurological: Negative.    Hematological:  Bruises/bleeds easily.      
Creatinine 1.1 (H), eGFR 57 (L) (08/11/23)  Magnesium (07/12/23): 1.0 (L)  A CBC and CMP and Magnesium has been ordered to be completed today    4.   Health Maintenance  Not up to date. She reports that she does not have a PCP currently. A referral has been placed to establish care with a [facility] PCP now.  Screening mammogram to be done. Order request has been placed today  Colorectal Cancer Screening, with Colonoscopy overdue since 2021. The patient carries a history of adenomatous polyps diagnosed on prior colonoscopy (2016). A referral to Dr. Juno Alicia has been placed for colonoscopy today.  A fasting Lipid panel has been ordered to be completed prior to her PCP visit    DISPOSITION:     The patient verbalized understanding of our extensive discussion today and of follow up instructions. All questions were answered to her apparent satisfaction. Therefore, she agreed to proceed as planned. Return to the office in 3 months or sooner for any abnormal symptoms. Return precautions have been reviewed.      TIME SUMMARY:  60 minutes F2F time  has been coded  Total time spent: 240 minutes.  In total, 120 minutes included records review, independent interpretation of findings, ordering medications, tests or procedures, referring and communicating with other health care professionals, coordination of care and chart completion outside of the date service (DATES: 02/01/2024)  In total, 120 minutes included records review, obtaining history, performing the examination and / or evaluation, independent interpretation of findings, patient counseling / teaching, face to face discussion with the patient, ordering tests, placement of referrals to other health care professionals, coordination of care and chart completion on the day of service (F2F START TIME: 1100. F2F STOP TIME: 1200)      Mary Roach MD  Gynecologic Oncology  
done once she has completed the cervix cancer treatments.    DISPOSITION:     The patient verbalized understanding of our extensive discussion today and of follow up instructions. All questions were answered to her apparent satisfaction. Therefore, she agreed to proceed as planned. Return to the office in 6 weeks for assessment of treatment effect and consideration for Baldomero Sleeve placement or sooner for any abnormal symptoms. Return precautions have been reviewed.    FROM LAST VISIT: Need to confirm on date of service  TIME SUMMARY:  Total time spent: 60 minutes.  In total, 60 minutes included records review, obtaining history, performing the examination and / or evaluation, independent interpretation of findings, patient counseling / teaching, face to face discussion with the patient, ordering tests or procedures, referring and communicating with other health care professionals, coordination of care and chart completion on the day of service (F2F START TIME: 1400. F2F STOP TIME: 1430).        TIME SUMMARY:  60 minutes F2F time  has been coded  Total time spent: *** minutes.  In total, *** minutes included records review, independent interpretation of findings, ordering medications, tests or procedures, referring and communicating with other health care professionals, coordination of care and chart completion outside of the date service (DATES: *** and ***. *** minutes on each date)  In total, *** minutes included records review, obtaining history, performing the examination and / or evaluation, independent interpretation of findings, patient counseling / teaching, face to face discussion with the patient, ordering medications, tests or procedures, referring and communicating with other health care professionals, coordination of care and chart completion on the day of service (F2F START TIME: ***. F2F STOP TIME: ***. This time excludes the 30 minutes utilized to complete the procedure)      Mary Roach,

## 2023-10-16 ENCOUNTER — HOSPITAL ENCOUNTER (OUTPATIENT)
Age: 63
Discharge: HOME | End: 2023-10-16
Payer: COMMERCIAL

## 2023-10-16 DIAGNOSIS — E83.52 HYPERCALCEMIA: ICD-10-CM

## 2023-10-16 DIAGNOSIS — C53.9 MALIGNANT NEOPLASM OF CERVIX, UNSPECIFIED SITE (HCC): ICD-10-CM

## 2023-10-16 DIAGNOSIS — Z51.11 CHEMOTHERAPY MANAGEMENT, ENCOUNTER FOR: ICD-10-CM

## 2023-10-16 DIAGNOSIS — Z92.21 HISTORY OF CHEMOTHERAPY: ICD-10-CM

## 2023-10-16 LAB
ALBUMIN SERPL-MCNC: 4.2 G/DL (ref 3.5–5.2)
ALBUMIN/GLOB SERPL: 1.1 {RATIO} (ref 1–2.5)
ALP SERPL-CCNC: 121 U/L (ref 35–104)
ALT SERPL-CCNC: 13 U/L (ref 5–33)
ANION GAP SERPL CALCULATED.3IONS-SCNC: 11 MMOL/L (ref 9–17)
AST SERPL-CCNC: 20 U/L
BASOPHILS # BLD: <0.03 K/UL (ref 0–0.2)
BASOPHILS NFR BLD: 0 % (ref 0–2)
BILIRUB SERPL-MCNC: 0.4 MG/DL (ref 0.3–1.2)
BUN SERPL-MCNC: 15 MG/DL (ref 8–23)
CALCIUM SERPL-MCNC: 9.6 MG/DL (ref 8.6–10.4)
CHLORIDE SERPL-SCNC: 100 MMOL/L (ref 98–107)
CHOLEST SERPL-MCNC: 144 MG/DL
CHOLESTEROL/HDL RATIO: 3.2
CO2 SERPL-SCNC: 27 MMOL/L (ref 20–31)
CREAT SERPL-MCNC: 0.9 MG/DL (ref 0.5–0.9)
EOSINOPHIL # BLD: 0.08 K/UL (ref 0–0.44)
EOSINOPHILS RELATIVE PERCENT: 1 % (ref 1–4)
ERYTHROCYTE [DISTWIDTH] IN BLOOD BY AUTOMATED COUNT: 13.9 % (ref 11.8–14.4)
FERRITIN SERPL-MCNC: 346 NG/ML (ref 13–150)
FOLATE SERPL-MCNC: >20 NG/ML
GFR SERPL CREATININE-BSD FRML MDRD: >60 ML/MIN/1.73M2
GLUCOSE SERPL-MCNC: 138 MG/DL (ref 70–99)
HCT VFR BLD AUTO: 32.2 % (ref 36.3–47.1)
HDLC SERPL-MCNC: 45 MG/DL
HGB BLD-MCNC: 11 G/DL (ref 11.9–15.1)
IMM GRANULOCYTES # BLD AUTO: 0.05 K/UL (ref 0–0.3)
IMM GRANULOCYTES NFR BLD: 1 %
IRON SATN MFR SERPL: 21 % (ref 20–55)
IRON SERPL-MCNC: 47 UG/DL (ref 37–145)
LDLC SERPL CALC-MCNC: 71 MG/DL (ref 0–130)
LYMPHOCYTES NFR BLD: 0.71 K/UL (ref 1.1–3.7)
LYMPHOCYTES RELATIVE PERCENT: 10 % (ref 24–43)
MAGNESIUM SERPL-MCNC: 1.4 MG/DL (ref 1.6–2.6)
MCH RBC QN AUTO: 31.3 PG (ref 25.2–33.5)
MCHC RBC AUTO-ENTMCNC: 34.2 G/DL (ref 28.4–34.8)
MCV RBC AUTO: 91.7 FL (ref 82.6–102.9)
MONOCYTES NFR BLD: 0.61 K/UL (ref 0.1–1.2)
MONOCYTES NFR BLD: 9 % (ref 3–12)
NEUTROPHILS NFR BLD: 79 % (ref 36–65)
NEUTS SEG NFR BLD: 5.46 K/UL (ref 1.5–8.1)
NRBC BLD-RTO: 0 PER 100 WBC
PLATELET # BLD AUTO: 163 K/UL (ref 138–453)
PMV BLD AUTO: 10.7 FL (ref 8.1–13.5)
POTASSIUM SERPL-SCNC: 4.2 MMOL/L (ref 3.7–5.3)
PROT SERPL-MCNC: 8 G/DL (ref 6.4–8.3)
RBC # BLD AUTO: 3.51 M/UL (ref 3.95–5.11)
SODIUM SERPL-SCNC: 138 MMOL/L (ref 135–144)
TIBC SERPL-MCNC: 223 UG/DL (ref 250–450)
TRIGL SERPL-MCNC: 141 MG/DL
TSH SERPL DL<=0.05 MIU/L-ACNC: 3.38 UIU/ML (ref 0.3–5)
UNSATURATED IRON BINDING CAPACITY: 176 UG/DL (ref 112–347)
VIT B12 SERPL-MCNC: 516 PG/ML (ref 232–1245)
WBC OTHER # BLD: 6.9 K/UL (ref 3.5–11.3)

## 2023-10-16 PROCEDURE — 80053 COMPREHEN METABOLIC PANEL: CPT

## 2023-10-16 PROCEDURE — 83550 IRON BINDING TEST: CPT

## 2023-10-16 PROCEDURE — 82728 ASSAY OF FERRITIN: CPT

## 2023-10-16 PROCEDURE — 83540 ASSAY OF IRON: CPT

## 2023-10-16 PROCEDURE — 85025 COMPLETE CBC W/AUTO DIFF WBC: CPT

## 2023-10-16 PROCEDURE — 80061 LIPID PANEL: CPT

## 2023-10-16 PROCEDURE — 36415 COLL VENOUS BLD VENIPUNCTURE: CPT

## 2023-10-16 PROCEDURE — 82746 ASSAY OF FOLIC ACID SERUM: CPT

## 2023-10-16 PROCEDURE — 83735 ASSAY OF MAGNESIUM: CPT

## 2023-10-16 PROCEDURE — 84443 ASSAY THYROID STIM HORMONE: CPT

## 2023-10-16 PROCEDURE — 82607 VITAMIN B-12: CPT

## 2023-10-20 ENCOUNTER — OFFICE VISIT (OUTPATIENT)
Dept: ONCOLOGY | Age: 63
End: 2023-10-20
Payer: COMMERCIAL

## 2023-10-20 ENCOUNTER — TELEPHONE (OUTPATIENT)
Dept: ONCOLOGY | Age: 63
End: 2023-10-20

## 2023-10-20 VITALS
WEIGHT: 205.2 LBS | DIASTOLIC BLOOD PRESSURE: 85 MMHG | SYSTOLIC BLOOD PRESSURE: 144 MMHG | HEART RATE: 94 BPM | TEMPERATURE: 97 F | BODY MASS INDEX: 37.53 KG/M2

## 2023-10-20 DIAGNOSIS — Z51.11 CHEMOTHERAPY MANAGEMENT, ENCOUNTER FOR: ICD-10-CM

## 2023-10-20 DIAGNOSIS — C53.8 MALIGNANT NEOPLASM OF OVERLAPPING SITES OF CERVIX UTERI (HCC): Primary | ICD-10-CM

## 2023-10-20 DIAGNOSIS — D64.9 ANEMIA, UNSPECIFIED TYPE: ICD-10-CM

## 2023-10-20 PROCEDURE — 99214 OFFICE O/P EST MOD 30 MIN: CPT | Performed by: INTERNAL MEDICINE

## 2023-10-20 PROCEDURE — 3077F SYST BP >= 140 MM HG: CPT | Performed by: INTERNAL MEDICINE

## 2023-10-20 PROCEDURE — 99211 OFF/OP EST MAY X REQ PHY/QHP: CPT | Performed by: INTERNAL MEDICINE

## 2023-10-20 PROCEDURE — 3079F DIAST BP 80-89 MM HG: CPT | Performed by: INTERNAL MEDICINE

## 2023-10-20 NOTE — PROGRESS NOTES
document that actually reflects the content of the visit, the document can still have some errors including those of syntax and sound a like substitutions which may escape proof reading. It such instances, actual meaning can be extrapolated by contextual diversion.

## 2023-10-20 NOTE — TELEPHONE ENCOUNTER
AVS from 10/20/23    Rv in 6 months with labs   *rv is sched   [email or web address] w labs prior iron tibc ferritin vitb12 folate       PT was given AVS and appt schedule

## 2023-10-28 SDOH — HEALTH STABILITY: PHYSICAL HEALTH: ON AVERAGE, HOW MANY DAYS PER WEEK DO YOU ENGAGE IN MODERATE TO STRENUOUS EXERCISE (LIKE A BRISK WALK)?: 0 DAYS

## 2023-10-28 SDOH — HEALTH STABILITY: PHYSICAL HEALTH: ON AVERAGE, HOW MANY MINUTES DO YOU ENGAGE IN EXERCISE AT THIS LEVEL?: 0 MIN

## 2023-10-28 ASSESSMENT — SOCIAL DETERMINANTS OF HEALTH (SDOH)
WITHIN THE LAST YEAR, HAVE TO BEEN RAPED OR FORCED TO HAVE ANY KIND OF SEXUAL ACTIVITY BY YOUR PARTNER OR EX-PARTNER?: NO
WITHIN THE LAST YEAR, HAVE YOU BEEN KICKED, HIT, SLAPPED, OR OTHERWISE PHYSICALLY HURT BY YOUR PARTNER OR EX-PARTNER?: NO
WITHIN THE LAST YEAR, HAVE YOU BEEN AFRAID OF YOUR PARTNER OR EX-PARTNER?: NO
WITHIN THE LAST YEAR, HAVE YOU BEEN HUMILIATED OR EMOTIONALLY ABUSED IN OTHER WAYS BY YOUR PARTNER OR EX-PARTNER?: NO

## 2023-10-30 ENCOUNTER — OFFICE VISIT (OUTPATIENT)
Dept: FAMILY MEDICINE CLINIC | Age: 63
End: 2023-10-30
Payer: COMMERCIAL

## 2023-10-30 VITALS
HEART RATE: 88 BPM | TEMPERATURE: 97.5 F | SYSTOLIC BLOOD PRESSURE: 130 MMHG | BODY MASS INDEX: 38.04 KG/M2 | RESPIRATION RATE: 16 BRPM | OXYGEN SATURATION: 98 % | DIASTOLIC BLOOD PRESSURE: 86 MMHG | WEIGHT: 208 LBS

## 2023-10-30 DIAGNOSIS — M54.30 BACK PAIN WITH SCIATICA: ICD-10-CM

## 2023-10-30 DIAGNOSIS — M25.552 LEFT HIP PAIN: ICD-10-CM

## 2023-10-30 DIAGNOSIS — M84.48XA SACRAL INSUFFICIENCY FRACTURE, INITIAL ENCOUNTER: Primary | ICD-10-CM

## 2023-10-30 DIAGNOSIS — E78.5 HYPERLIPIDEMIA, UNSPECIFIED HYPERLIPIDEMIA TYPE: ICD-10-CM

## 2023-10-30 DIAGNOSIS — M54.9 BACK PAIN WITH SCIATICA: ICD-10-CM

## 2023-10-30 DIAGNOSIS — R73.01 IFG (IMPAIRED FASTING GLUCOSE): ICD-10-CM

## 2023-10-30 DIAGNOSIS — F41.0 PANIC DISORDER: ICD-10-CM

## 2023-10-30 PROCEDURE — 3075F SYST BP GE 130 - 139MM HG: CPT | Performed by: NURSE PRACTITIONER

## 2023-10-30 PROCEDURE — 3079F DIAST BP 80-89 MM HG: CPT | Performed by: NURSE PRACTITIONER

## 2023-10-30 PROCEDURE — 99205 OFFICE O/P NEW HI 60 MIN: CPT | Performed by: NURSE PRACTITIONER

## 2023-10-30 RX ORDER — PREDNISONE 10 MG/1
TABLET ORAL
Qty: 18 TABLET | Refills: 0 | Status: SHIPPED | OUTPATIENT
Start: 2023-10-30 | End: 2023-11-09

## 2023-10-30 RX ORDER — ROSUVASTATIN CALCIUM 5 MG/1
5 TABLET, COATED ORAL NIGHTLY
Qty: 90 TABLET | Refills: 1 | Status: SHIPPED | OUTPATIENT
Start: 2023-10-30

## 2023-10-30 RX ORDER — ALPRAZOLAM 0.5 MG/1
0.5 TABLET ORAL NIGHTLY PRN
Qty: 30 TABLET | Refills: 0 | Status: SHIPPED | OUTPATIENT
Start: 2023-10-30 | End: 2023-11-29

## 2023-10-30 SDOH — ECONOMIC STABILITY: INCOME INSECURITY: HOW HARD IS IT FOR YOU TO PAY FOR THE VERY BASICS LIKE FOOD, HOUSING, MEDICAL CARE, AND HEATING?: NOT HARD AT ALL

## 2023-10-30 SDOH — ECONOMIC STABILITY: FOOD INSECURITY: WITHIN THE PAST 12 MONTHS, YOU WORRIED THAT YOUR FOOD WOULD RUN OUT BEFORE YOU GOT MONEY TO BUY MORE.: NEVER TRUE

## 2023-10-30 SDOH — ECONOMIC STABILITY: FOOD INSECURITY: WITHIN THE PAST 12 MONTHS, THE FOOD YOU BOUGHT JUST DIDN'T LAST AND YOU DIDN'T HAVE MONEY TO GET MORE.: NEVER TRUE

## 2023-10-30 SDOH — ECONOMIC STABILITY: HOUSING INSECURITY
IN THE LAST 12 MONTHS, WAS THERE A TIME WHEN YOU DID NOT HAVE A STEADY PLACE TO SLEEP OR SLEPT IN A SHELTER (INCLUDING NOW)?: NO

## 2023-10-30 ASSESSMENT — ENCOUNTER SYMPTOMS
DIARRHEA: 0
SORE THROAT: 0
ABDOMINAL PAIN: 0
COUGH: 0
SHORTNESS OF BREATH: 0
CHEST TIGHTNESS: 0
NAUSEA: 0
COLOR CHANGE: 0
RHINORRHEA: 0
ABDOMINAL DISTENTION: 0
BACK PAIN: 0
CONSTIPATION: 0

## 2023-10-30 NOTE — PROGRESS NOTES
activity a week and watch calorie portions and to limit her carbs in her diet. - Foods that helps increase HDL include the following: Fish, nuts, flax, avocados, and legumes (such as soy, kidney beans, chickpeas). - Will cont with low fat/chol diet and rosuvastatin as ordered. Sacral insufficiency fracture, initial encounter  Left hip pain  - referral given for ortho for further evaluation and treatment options, due to MRI results with sacral insufficiency fracture vs osteonecrosis. - [address]    IFG (impaired fasting glucose)  - Hemoglobin A1C; Future    Back pain with sciatica  - start prednisone has prescribed. - back exercises as discussed and given on AVS.     Panic disorder  - Stable: Medication re-filled as needed, con't medications as prescribed, con't current tx plan  - Continue with xanax as previously prescribed. - Pt encouraged to deep breath and relax through anxious moments   - Offered reassurance and allowed to ventilate feelings and ask questions.   - Non-pharmological coping methods discussed. - PDMP reviewed and medication contract signed in office.     - We did discuss the recommended preventative screening guidelines including routine dental and eye exams.   - Detailed education was provided on the patient's after visit summary.  - Will order above noted labs and discuss them at follow up visit. - Will cont to follow with  GYN as instructed for routine PAP. - Annual mammograms as recommended. - pt verbalized understanding plan of care. Medications, labs, diagnostic studies, consultations and follow-up as documented in this encounter. Rest of systems unchanged, continue current treatments.     On this date 10/30/2023 I have spent 60 minutes reviewing previous notes, test results and face to face with the patient discussing the diagnosis and importance of compliance with the treatment plan as well as documenting on the day of the

## 2023-11-02 ENCOUNTER — TELEPHONE (OUTPATIENT)
Dept: FAMILY MEDICINE CLINIC | Age: 63
End: 2023-11-02

## 2023-11-02 DIAGNOSIS — M25.552 LEFT HIP PAIN: Primary | ICD-10-CM

## 2023-11-02 DIAGNOSIS — M84.48XA SACRAL INSUFFICIENCY FRACTURE, INITIAL ENCOUNTER: ICD-10-CM

## 2023-11-02 RX ORDER — HYDROCODONE BITARTRATE AND ACETAMINOPHEN 5; 325 MG/1; MG/1
1 TABLET ORAL EVERY 6 HOURS PRN
Qty: 20 TABLET | Refills: 0 | Status: SHIPPED | OUTPATIENT
Start: 2023-11-02 | End: 2023-11-07

## 2023-11-02 NOTE — TELEPHONE ENCOUNTER
Patient was seen on 10/30/2023 and discussed her hip pain. She states she was started on a steroid burst but she is still having a lot of pain and is unable to walk. She would like a pain medication called into her pharmacy.

## 2023-11-07 ASSESSMENT — SOCIAL DETERMINANTS OF HEALTH (SDOH)
WITHIN THE LAST YEAR, HAVE YOU BEEN HUMILIATED OR EMOTIONALLY ABUSED IN OTHER WAYS BY YOUR PARTNER OR EX-PARTNER?: NO
WITHIN THE LAST YEAR, HAVE YOU BEEN AFRAID OF YOUR PARTNER OR EX-PARTNER?: NO
WITHIN THE LAST YEAR, HAVE TO BEEN RAPED OR FORCED TO HAVE ANY KIND OF SEXUAL ACTIVITY BY YOUR PARTNER OR EX-PARTNER?: NO
WITHIN THE LAST YEAR, HAVE YOU BEEN KICKED, HIT, SLAPPED, OR OTHERWISE PHYSICALLY HURT BY YOUR PARTNER OR EX-PARTNER?: NO

## 2023-11-08 ENCOUNTER — HOSPITAL ENCOUNTER (OUTPATIENT)
Dept: MAMMOGRAPHY | Age: 63
Discharge: HOME | End: 2023-11-10
Payer: COMMERCIAL

## 2023-11-08 VITALS — WEIGHT: 208 LBS | HEIGHT: 62 IN | BODY MASS INDEX: 38.28 KG/M2

## 2023-11-08 DIAGNOSIS — Z00.00 HEALTHCARE MAINTENANCE: ICD-10-CM

## 2023-11-08 PROCEDURE — 77063 BREAST TOMOSYNTHESIS BI: CPT

## 2023-11-10 ENCOUNTER — TELEPHONE (OUTPATIENT)
Dept: ORTHOPEDIC SURGERY | Age: 63
End: 2023-11-10

## 2023-11-10 ENCOUNTER — OFFICE VISIT (OUTPATIENT)
Dept: ORTHOPEDIC SURGERY | Age: 63
End: 2023-11-10

## 2023-11-10 VITALS — HEIGHT: 62 IN | WEIGHT: 207.89 LBS | BODY MASS INDEX: 38.26 KG/M2

## 2023-11-10 DIAGNOSIS — M54.16 LUMBAR BACK PAIN WITH RADICULOPATHY AFFECTING LEFT LOWER EXTREMITY: Primary | ICD-10-CM

## 2023-11-10 DIAGNOSIS — M25.552 LEFT HIP PAIN: ICD-10-CM

## 2023-11-10 RX ORDER — TRAMADOL HYDROCHLORIDE 50 MG/1
50 TABLET ORAL EVERY 6 HOURS PRN
Qty: 28 TABLET | Refills: 0 | Status: SHIPPED | OUTPATIENT
Start: 2023-11-10 | End: 2023-11-17

## 2023-11-10 NOTE — TELEPHONE ENCOUNTER
Central scheduling called to request MRI Lumbar spine without contrast be updated to with and without due to patients Cancer history.

## 2023-11-21 ENCOUNTER — HOSPITAL ENCOUNTER (OUTPATIENT)
Dept: MRI IMAGING | Age: 63
Discharge: HOME | End: 2023-11-23
Attending: ORTHOPAEDIC SURGERY
Payer: COMMERCIAL

## 2023-11-21 DIAGNOSIS — M54.16 LUMBAR BACK PAIN WITH RADICULOPATHY AFFECTING LEFT LOWER EXTREMITY: ICD-10-CM

## 2023-11-21 PROCEDURE — A9579 GAD-BASE MR CONTRAST NOS,1ML: HCPCS | Performed by: ORTHOPAEDIC SURGERY

## 2023-11-21 PROCEDURE — 6360000004 HC RX CONTRAST MEDICATION: Performed by: ORTHOPAEDIC SURGERY

## 2023-11-21 PROCEDURE — 72158 MRI LUMBAR SPINE W/O & W/DYE: CPT

## 2023-11-21 PROCEDURE — 2580000003 HC RX 258: Performed by: ORTHOPAEDIC SURGERY

## 2023-11-21 RX ORDER — SODIUM CHLORIDE 0.9 % (FLUSH) 0.9 %
10 SYRINGE (ML) INJECTION ONCE
Status: COMPLETED | OUTPATIENT
Start: 2023-11-21 | End: 2023-11-21

## 2023-11-21 RX ADMIN — SODIUM CHLORIDE, PRESERVATIVE FREE 10 ML: 5 INJECTION INTRAVENOUS at 13:23

## 2023-11-21 RX ADMIN — GADOTERIDOL 19 ML: 279.3 INJECTION, SOLUTION INTRAVENOUS at 13:23

## 2023-11-27 ENCOUNTER — TELEPHONE (OUTPATIENT)
Dept: ORTHOPEDIC SURGERY | Age: 63
End: 2023-11-27

## 2023-11-27 DIAGNOSIS — M54.16 LUMBAR BACK PAIN WITH RADICULOPATHY AFFECTING LEFT LOWER EXTREMITY: Primary | ICD-10-CM

## 2023-11-27 NOTE — TELEPHONE ENCOUNTER
----- Message from Michel Nesbitt MD sent at 11/27/2023  8:21 AM EST -----  Patient to be seen by Dr. Micheline Jules  ----- Message -----  From: Jacquelyn Khoury Incoming Radiant Results From [employer]/[facility]  Sent: 11/23/2023   8:49 AM EST  To: Michel Nesbitt MD

## 2023-11-27 NOTE — TELEPHONE ENCOUNTER
----- Message from Kavon Moura MD sent at 11/27/2023  8:21 AM EST -----  Patient to be seen by Dr. Tracy Chau  ----- Message -----  From: Jacquelyn Khoury Incoming Radiant Results From [employer]/[facility]  Sent: 11/23/2023   8:49 AM EST  To: Kavon Moura MD

## 2023-11-28 RX ORDER — TRAMADOL HYDROCHLORIDE 50 MG/1
50 TABLET ORAL EVERY 6 HOURS PRN
Qty: 28 TABLET | Refills: 0 | Status: SHIPPED | OUTPATIENT
Start: 2023-11-28 | End: 2023-12-05

## 2023-11-30 ENCOUNTER — HOSPITAL ENCOUNTER (OUTPATIENT)
Age: 63
Setting detail: SPECIMEN
Discharge: HOME | End: 2023-11-30

## 2023-11-30 ENCOUNTER — OFFICE VISIT (OUTPATIENT)
Dept: GYNECOLOGIC ONCOLOGY | Age: 63
End: 2023-11-30
Payer: COMMERCIAL

## 2023-11-30 VITALS
TEMPERATURE: 98.1 F | SYSTOLIC BLOOD PRESSURE: 157 MMHG | BODY MASS INDEX: 37.28 KG/M2 | HEIGHT: 62 IN | WEIGHT: 202.6 LBS | OXYGEN SATURATION: 96 % | HEART RATE: 96 BPM | DIASTOLIC BLOOD PRESSURE: 95 MMHG

## 2023-11-30 DIAGNOSIS — N89.8 VAGINAL DISCHARGE: ICD-10-CM

## 2023-11-30 DIAGNOSIS — C53.1 MALIGNANT NEOPLASM OF EXOCERVIX (HCC): Primary | ICD-10-CM

## 2023-11-30 DIAGNOSIS — E83.42 HYPOMAGNESEMIA: ICD-10-CM

## 2023-11-30 DIAGNOSIS — D64.9 ANEMIA, UNSPECIFIED TYPE: ICD-10-CM

## 2023-11-30 DIAGNOSIS — M84.48XG SACRAL INSUFFICIENCY FRACTURE WITH DELAYED HEALING, SUBSEQUENT ENCOUNTER: ICD-10-CM

## 2023-11-30 PROCEDURE — 99215 OFFICE O/P EST HI 40 MIN: CPT | Performed by: OBSTETRICS & GYNECOLOGY

## 2023-11-30 PROCEDURE — 3077F SYST BP >= 140 MM HG: CPT | Performed by: OBSTETRICS & GYNECOLOGY

## 2023-11-30 PROCEDURE — 3080F DIAST BP >= 90 MM HG: CPT | Performed by: OBSTETRICS & GYNECOLOGY

## 2023-11-30 RX ORDER — LIDOCAINE 50 MG/G
1 PATCH TOPICAL DAILY
Qty: 14 PATCH | Refills: 0 | Status: SHIPPED | OUTPATIENT
Start: 2023-11-30 | End: 2023-12-14

## 2023-12-01 ENCOUNTER — TELEPHONE (OUTPATIENT)
Dept: ONCOLOGY | Age: 63
End: 2023-12-01

## 2023-12-01 LAB
CANDIDA SPECIES: NEGATIVE
GARDNERELLA VAGINALIS: NEGATIVE
SOURCE: NORMAL
TRICHOMONAS: NEGATIVE

## 2023-12-05 ENCOUNTER — TELEPHONE (OUTPATIENT)
Dept: RADIATION ONCOLOGY | Age: 63
End: 2023-12-05

## 2023-12-05 NOTE — TELEPHONE ENCOUNTER
Pt has 3 mo f/u on 12/12/23, however, pt is getting PET scan on 1/12/24, therefore, I called pt and moved her follow up out until after testing. Rescheduled for 1/23/24 @3:00.

## 2024-01-02 ENCOUNTER — HOSPITAL ENCOUNTER (OUTPATIENT)
Dept: NUCLEAR MEDICINE | Age: 64
Discharge: HOME | End: 2024-01-04
Payer: COMMERCIAL

## 2024-01-02 ENCOUNTER — HOSPITAL ENCOUNTER (OUTPATIENT)
Dept: MAMMOGRAPHY | Age: 64
Discharge: HOME | End: 2024-01-04
Payer: COMMERCIAL

## 2024-01-02 DIAGNOSIS — M84.48XG SACRAL INSUFFICIENCY FRACTURE WITH DELAYED HEALING, SUBSEQUENT ENCOUNTER: ICD-10-CM

## 2024-01-02 DIAGNOSIS — C53.1 MALIGNANT NEOPLASM OF EXOCERVIX (HCC): ICD-10-CM

## 2024-01-02 PROCEDURE — 3430000000 HC RX DIAGNOSTIC RADIOPHARMACEUTICAL: Performed by: OBSTETRICS & GYNECOLOGY

## 2024-01-02 PROCEDURE — 78306 BONE IMAGING WHOLE BODY: CPT | Performed by: OBSTETRICS & GYNECOLOGY

## 2024-01-02 PROCEDURE — A9503 TC99M MEDRONATE: HCPCS | Performed by: OBSTETRICS & GYNECOLOGY

## 2024-01-02 RX ORDER — TC 99M MEDRONATE 20 MG/10ML
24.7 INJECTION, POWDER, LYOPHILIZED, FOR SOLUTION INTRAVENOUS
Status: COMPLETED | OUTPATIENT
Start: 2024-01-02 | End: 2024-01-02

## 2024-01-02 RX ADMIN — TC 99M MEDRONATE 24.7 MILLICURIE: 20 INJECTION, POWDER, LYOPHILIZED, FOR SOLUTION INTRAVENOUS at 10:40

## 2024-01-06 SDOH — HEALTH STABILITY: PHYSICAL HEALTH: ON AVERAGE, HOW MANY DAYS PER WEEK DO YOU ENGAGE IN MODERATE TO STRENUOUS EXERCISE (LIKE A BRISK WALK)?: 0 DAYS

## 2024-01-06 SDOH — HEALTH STABILITY: PHYSICAL HEALTH: ON AVERAGE, HOW MANY MINUTES DO YOU ENGAGE IN EXERCISE AT THIS LEVEL?: 0 MIN

## 2024-01-09 ENCOUNTER — OFFICE VISIT (OUTPATIENT)
Dept: ORTHOPEDIC SURGERY | Age: 64
End: 2024-01-09
Payer: COMMERCIAL

## 2024-01-09 VITALS — RESPIRATION RATE: 14 BRPM | HEIGHT: 62 IN | WEIGHT: 202.6 LBS | BODY MASS INDEX: 37.28 KG/M2

## 2024-01-09 DIAGNOSIS — M84.48XG SACRAL INSUFFICIENCY FRACTURE WITH DELAYED HEALING, SUBSEQUENT ENCOUNTER: Primary | ICD-10-CM

## 2024-01-09 PROCEDURE — 99213 OFFICE O/P EST LOW 20 MIN: CPT | Performed by: ORTHOPAEDIC SURGERY

## 2024-01-09 RX ORDER — LANOLIN ALCOHOL/MO/W.PET/CERES
CREAM (GRAM) TOPICAL
Status: ON HOLD | COMMUNITY
Start: 2023-11-02 | End: 2024-01-11

## 2024-01-09 RX ORDER — LANOLIN ALCOHOL/MO/W.PET/CERES
1 CREAM (GRAM) TOPICAL
COMMUNITY

## 2024-01-09 NOTE — PROGRESS NOTES
pack/day for 43.0 years (43.0 ttl pk-yrs)     Types: Cigarettes     Start date: 1976     Quit date: 2018     Years since quittin.7    Smokeless tobacco: Never   Vaping Use    Vaping Use: Never used   Substance and Sexual Activity    Alcohol use: Yes     Alcohol/week: 3.0 standard drinks of alcohol     Types: 3 Cans of beer per week     Comment: 2-3 BEERS WEEKENDS    Drug use: Yes     Frequency: 14.0 times per week     Types: Marijuana (Weed)     Comment: EDIBLES DAILY    Sexual activity: Not Currently   Other Topics Concern    Not on file   Social History Narrative    Not on file     Social Determinants of Health     Financial Resource Strain: Low Risk  (10/30/2023)    Overall Financial Resource Strain (CARDIA)     Difficulty of Paying Living Expenses: Not hard at all   Food Insecurity: Not on file (10/30/2023)   Transportation Needs: Unknown (10/30/2023)    PRAPARE - Transportation     Lack of Transportation (Medical): Not on file     Lack of Transportation (Non-Medical): No   Physical Activity: Inactive (2024)    Exercise Vital Sign     Days of Exercise per Week: 0 days     Minutes of Exercise per Session: 0 min   Stress: Not on file   Social Connections: Not on file   Intimate Partner Violence: Not At Risk (2023)    Humiliation, Afraid, Rape, and Kick questionnaire     Fear of Current or Ex-Partner: No     Emotionally Abused: No     Physically Abused: No     Sexually Abused: No   Housing Stability: Unknown (10/30/2023)    Housing Stability Vital Sign     Unable to Pay for Housing in the Last Year: Not on file     Number of Places Lived in the Last Year: Not on file     Unstable Housing in the Last Year: No     Past Medical History:   Diagnosis Date    Cancer (HCC)     Cervical cancer (HCC)     Essential hypertension 2016    Hyperlipidemia     IFG (impaired fasting glucose) 2020    Panic disorder      Past Surgical History:   Procedure Laterality Date    CERVIX BIOPSY N/A

## 2024-01-10 ENCOUNTER — HOSPITAL ENCOUNTER (OUTPATIENT)
Dept: PREADMISSION TESTING | Age: 64
Setting detail: OUTPATIENT SURGERY
End: 2024-01-10
Attending: ORTHOPAEDIC SURGERY | Admitting: ORTHOPAEDIC SURGERY
Payer: COMMERCIAL

## 2024-01-10 ENCOUNTER — ANESTHESIA EVENT (OUTPATIENT)
Dept: OPERATING ROOM | Age: 64
End: 2024-01-10
Payer: COMMERCIAL

## 2024-01-10 VITALS
RESPIRATION RATE: 20 BRPM | OXYGEN SATURATION: 93 % | DIASTOLIC BLOOD PRESSURE: 74 MMHG | TEMPERATURE: 98.6 F | SYSTOLIC BLOOD PRESSURE: 140 MMHG | HEART RATE: 81 BPM | HEIGHT: 62 IN | WEIGHT: 202 LBS | BODY MASS INDEX: 37.17 KG/M2

## 2024-01-10 LAB
ANION GAP SERPL CALCULATED.3IONS-SCNC: 11 MMOL/L (ref 9–17)
BASOPHILS # BLD: 0 K/UL (ref 0–0.2)
BASOPHILS NFR BLD: 0 % (ref 0–2)
BUN SERPL-MCNC: 15 MG/DL (ref 8–23)
CALCIUM SERPL-MCNC: 9.3 MG/DL (ref 8.6–10.4)
CHLORIDE SERPL-SCNC: 97 MMOL/L (ref 98–107)
CO2 SERPL-SCNC: 28 MMOL/L (ref 20–31)
CREAT SERPL-MCNC: 0.8 MG/DL (ref 0.5–0.9)
EOSINOPHIL # BLD: 0.1 K/UL (ref 0–0.4)
EOSINOPHILS RELATIVE PERCENT: 1 % (ref 0–4)
ERYTHROCYTE [DISTWIDTH] IN BLOOD BY AUTOMATED COUNT: 16.9 % (ref 11.5–14.9)
GFR SERPL CREATININE-BSD FRML MDRD: >60 ML/MIN/1.73M2
GLUCOSE SERPL-MCNC: 83 MG/DL (ref 70–99)
HCT VFR BLD AUTO: 33.7 % (ref 36–46)
HGB BLD-MCNC: 11.2 G/DL (ref 12–16)
LYMPHOCYTES NFR BLD: 1.1 K/UL (ref 1–4.8)
LYMPHOCYTES RELATIVE PERCENT: 16 % (ref 24–44)
MCH RBC QN AUTO: 28.7 PG (ref 26–34)
MCHC RBC AUTO-ENTMCNC: 33.2 G/DL (ref 31–37)
MCV RBC AUTO: 86.6 FL (ref 80–100)
MONOCYTES NFR BLD: 0.7 K/UL (ref 0.1–1.3)
MONOCYTES NFR BLD: 10 % (ref 1–7)
NEUTROPHILS NFR BLD: 73 % (ref 36–66)
NEUTS SEG NFR BLD: 5.3 K/UL (ref 1.3–9.1)
PLATELET # BLD AUTO: 206 K/UL (ref 150–450)
PMV BLD AUTO: 9 FL (ref 6–12)
POTASSIUM SERPL-SCNC: 4 MMOL/L (ref 3.7–5.3)
RBC # BLD AUTO: 3.89 M/UL (ref 4–5.2)
SODIUM SERPL-SCNC: 136 MMOL/L (ref 135–144)
WBC OTHER # BLD: 7.3 K/UL (ref 3.5–11)

## 2024-01-10 PROCEDURE — 80048 BASIC METABOLIC PNL TOTAL CA: CPT

## 2024-01-10 PROCEDURE — 93005 ELECTROCARDIOGRAM TRACING: CPT | Performed by: ANESTHESIOLOGY

## 2024-01-10 PROCEDURE — 85025 COMPLETE CBC W/AUTO DIFF WBC: CPT

## 2024-01-10 PROCEDURE — 36415 COLL VENOUS BLD VENIPUNCTURE: CPT

## 2024-01-10 PROCEDURE — APPSS45 APP SPLIT SHARED TIME 31-45 MINUTES: Performed by: NURSE PRACTITIONER

## 2024-01-10 RX ORDER — ALPRAZOLAM 0.5 MG/1
0.5 TABLET ORAL NIGHTLY PRN
COMMUNITY

## 2024-01-10 RX ORDER — TRAMADOL HYDROCHLORIDE 50 MG/1
50 TABLET ORAL EVERY 6 HOURS PRN
COMMUNITY

## 2024-01-10 NOTE — DISCHARGE INSTRUCTIONS
Pre-op Instructions For Out-Patient Surgery    Medication Instructions:  Please stop herbs and any supplements now (includes vitamins and minerals).    Please contact your surgeon and prescribing physician for pre-op instructions for any blood thinners. Ibuprofen     If you have inhalers/aerosol treatments at home, please use them the morning of your surgery and bring the inhalers with you to the hospital.    Please take the following medications the morning of your surgery with a sip of water:    None     Surgery Instructions:  After midnight before surgery:  Do not eat or drink anything, including water, mints, gum, and hard candy.  You may brush your teeth without swallowing.  No smoking, chewing tobacco, or street drugs.    Please shower or bathe before surgery.  If you were given Surgical Scrub Chlorhexidine Gluconate Liquid (CHG), please shower the night before and the morning of your surgery following the detailed instructions you received during your pre-admission visit.     Please do not wear any cologne, lotion, powder, deodorant, jewelry, piercings, perfume, makeup, nail polish, hair accessories, or hair spray on the day of surgery.  Wear loose comfortable clothing.    Leave your valuables at home but bring a payment source for any after-surgery prescriptions you plan to fill at [facility].  Bring a storage case for any glasses/contacts.    An adult who is responsible for you MUST drive you home and should be with you for the first 24 hours after surgery.     If having out-patient knee and foot surgeries, please arrange for planned crutches, walker, or wheelchair before arriving to the hospital.    The Day of Surgery:  Arrive at [facility] Surgery Entrance at the time directed by your surgeon and check in at the desk.     If you have a living will or healthcare power of , please bring a copy.    You will be taken to the pre-op holding area where  you will be prepared for surgery.  A physical assessment will be performed by a nurse practitioner or house officer.  Your IV will be started and you will meet your anesthesiologist.    When you go to surgery, your family will be directed to the surgical waiting room, where the doctor should speak with them after your surgery.    After surgery, you will be taken to the recovery area.  When you are alert and stable, you will receive instructions and be prepared for discharge.     If you use a Bi-PAP or C-PAP machine, please bring it with you and leave it in the car in case it is needed in recovery room.

## 2024-01-10 NOTE — H&P
Days of Exercise per Week: 0 days     Minutes of Exercise per Session: 0 min   Intimate Partner Violence: Not At Risk (11/7/2023)    Humiliation, Afraid, Rape, and Kick questionnaire     Fear of Current or Ex-Partner: No     Emotionally Abused: No     Physically Abused: No     Sexually Abused: No   Housing Stability: Unknown (10/30/2023)    Housing Stability Vital Sign     Unstable Housing in the Last Year: No           REVIEW OF SYSTEMS      Allergies   Allergen Reactions    Atorvastatin Other (See Comments)     Causes Muscle cramps       Current Outpatient Medications on File Prior to Encounter   Medication Sig Dispense Refill    traMADol (ULTRAM) 50 MG tablet Take 1 tablet by mouth every 6 hours as needed for Pain. Max Daily Amount: 200 mg      ALPRAZolam (XANAX) 0.5 MG tablet Take 1 tablet by mouth nightly as needed for Sleep. Max Daily Amount: 0.5 mg      magnesium oxide (MAG-OX) 400 (240 Mg) MG tablet  (Patient not taking: Reported on 1/10/2024)      Biotin 300 MCG TABS Take 1 tablet by mouth      rosuvastatin (CRESTOR) 5 MG tablet Take 1 tablet by mouth nightly 90 tablet 1    BLACK CURRANT SEED OIL PO Take by mouth 1 teaspoon daily      Multiple Vitamins-Minerals (MULTIVITAMIN ADULTS 50+) TABS Take by mouth       No current facility-administered medications on file prior to encounter.        General health:  Fairly good.  No fever or chills.                 Skin:  No itching, redness or rash.                 HEENT:  No headache, epistaxis or sore throat.                 Neck:  No pain, stiffness or masses.                 Cardiovascular/Respiratory system:  No chest pain, palpitation or shortness of breath.                         Gastrointestinal tract: No abdominal pain, Dysphagia, nausea, vomiting, diarrhea or constipation.              Genitourinary:  No burning on micturition.  No hesitancy, urgency, frequency or discoloration of urine.            Locomotor:  See HPI.               Neuropsychiatric:  No

## 2024-01-11 ENCOUNTER — ANESTHESIA (OUTPATIENT)
Dept: OPERATING ROOM | Age: 64
End: 2024-01-11
Payer: COMMERCIAL

## 2024-01-11 ENCOUNTER — APPOINTMENT (OUTPATIENT)
Dept: GENERAL RADIOLOGY | Age: 64
End: 2024-01-11
Attending: ORTHOPAEDIC SURGERY
Payer: COMMERCIAL

## 2024-01-11 ENCOUNTER — HOSPITAL ENCOUNTER (OUTPATIENT)
Age: 64
Setting detail: OUTPATIENT SURGERY
Discharge: HOME | End: 2024-01-11
Attending: ORTHOPAEDIC SURGERY | Admitting: ORTHOPAEDIC SURGERY
Payer: COMMERCIAL

## 2024-01-11 VITALS
TEMPERATURE: 97 F | BODY MASS INDEX: 37.17 KG/M2 | RESPIRATION RATE: 16 BRPM | SYSTOLIC BLOOD PRESSURE: 159 MMHG | DIASTOLIC BLOOD PRESSURE: 88 MMHG | HEIGHT: 62 IN | OXYGEN SATURATION: 96 % | WEIGHT: 202 LBS | HEART RATE: 86 BPM

## 2024-01-11 DIAGNOSIS — M84.48XA SACRAL INSUFFICIENCY FRACTURE, INITIAL ENCOUNTER: ICD-10-CM

## 2024-01-11 LAB
EKG ATRIAL RATE: 82 BPM
EKG P AXIS: 42 DEGREES
EKG P-R INTERVAL: 164 MS
EKG Q-T INTERVAL: 386 MS
EKG QRS DURATION: 82 MS
EKG QTC CALCULATION (BAZETT): 450 MS
EKG R AXIS: 7 DEGREES
EKG T AXIS: 30 DEGREES
EKG VENTRICULAR RATE: 82 BPM

## 2024-01-11 PROCEDURE — 7100000030 HC ASPR PHASE II RECOVERY - FIRST 15 MIN: Performed by: ORTHOPAEDIC SURGERY

## 2024-01-11 PROCEDURE — 3600000012 HC SURGERY LEVEL 2 ADDTL 15MIN: Performed by: ORTHOPAEDIC SURGERY

## 2024-01-11 PROCEDURE — 88311 DECALCIFY TISSUE: CPT

## 2024-01-11 PROCEDURE — 2709999900 HC NON-CHARGEABLE SUPPLY: Performed by: ORTHOPAEDIC SURGERY

## 2024-01-11 PROCEDURE — 7100000031 HC ASPR PHASE II RECOVERY - ADDTL 15 MIN: Performed by: ORTHOPAEDIC SURGERY

## 2024-01-11 PROCEDURE — 88307 TISSUE EXAM BY PATHOLOGIST: CPT

## 2024-01-11 PROCEDURE — 6370000000 HC RX 637 (ALT 250 FOR IP): Performed by: ANESTHESIOLOGY

## 2024-01-11 PROCEDURE — 88342 IMHCHEM/IMCYTCHM 1ST ANTB: CPT

## 2024-01-11 PROCEDURE — 6360000002 HC RX W HCPCS

## 2024-01-11 PROCEDURE — 7100000001 HC PACU RECOVERY - ADDTL 15 MIN: Performed by: ORTHOPAEDIC SURGERY

## 2024-01-11 PROCEDURE — 88341 IMHCHEM/IMCYTCHM EA ADD ANTB: CPT

## 2024-01-11 PROCEDURE — 2500000003 HC RX 250 WO HCPCS: Performed by: ORTHOPAEDIC SURGERY

## 2024-01-11 PROCEDURE — 2580000003 HC RX 258: Performed by: ANESTHESIOLOGY

## 2024-01-11 PROCEDURE — 3700000000 HC ANESTHESIA ATTENDED CARE: Performed by: ORTHOPAEDIC SURGERY

## 2024-01-11 PROCEDURE — 7100000000 HC PACU RECOVERY - FIRST 15 MIN: Performed by: ORTHOPAEDIC SURGERY

## 2024-01-11 PROCEDURE — 3700000001 HC ADD 15 MINUTES (ANESTHESIA): Performed by: ORTHOPAEDIC SURGERY

## 2024-01-11 PROCEDURE — C1713 ANCHOR/SCREW BN/BN,TIS/BN: HCPCS | Performed by: ORTHOPAEDIC SURGERY

## 2024-01-11 PROCEDURE — C1894 INTRO/SHEATH, NON-LASER: HCPCS | Performed by: ORTHOPAEDIC SURGERY

## 2024-01-11 PROCEDURE — 3600000002 HC SURGERY LEVEL 2 BASE: Performed by: ORTHOPAEDIC SURGERY

## 2024-01-11 PROCEDURE — 7100000011 HC PHASE II RECOVERY - ADDTL 15 MIN: Performed by: ORTHOPAEDIC SURGERY

## 2024-01-11 PROCEDURE — 7100000010 HC PHASE II RECOVERY - FIRST 15 MIN: Performed by: ORTHOPAEDIC SURGERY

## 2024-01-11 PROCEDURE — 2500000003 HC RX 250 WO HCPCS

## 2024-01-11 PROCEDURE — 2500000003 HC RX 250 WO HCPCS: Performed by: NURSE ANESTHETIST, CERTIFIED REGISTERED

## 2024-01-11 PROCEDURE — 6360000002 HC RX W HCPCS: Performed by: ANESTHESIOLOGY

## 2024-01-11 PROCEDURE — 6360000002 HC RX W HCPCS: Performed by: NURSE ANESTHETIST, CERTIFIED REGISTERED

## 2024-01-11 PROCEDURE — 93010 ELECTROCARDIOGRAM REPORT: CPT | Performed by: INTERNAL MEDICINE

## 2024-01-11 DEVICE — CEMENT C01A KYPHX HV-R BONE CEMENT EN
Type: IMPLANTABLE DEVICE | Site: VERTEBRAE | Status: FUNCTIONAL
Brand: KYPHON® HV-R® BONE CEMENT

## 2024-01-11 RX ORDER — ONDANSETRON 2 MG/ML
4 INJECTION INTRAMUSCULAR; INTRAVENOUS
Status: COMPLETED | OUTPATIENT
Start: 2024-01-11 | End: 2024-01-11

## 2024-01-11 RX ORDER — LABETALOL HYDROCHLORIDE 5 MG/ML
10 INJECTION, SOLUTION INTRAVENOUS
Status: DISCONTINUED | OUTPATIENT
Start: 2024-01-11 | End: 2024-01-11 | Stop reason: HOSPADM

## 2024-01-11 RX ORDER — SODIUM CHLORIDE 0.9 % (FLUSH) 0.9 %
5-40 SYRINGE (ML) INJECTION PRN
Status: DISCONTINUED | OUTPATIENT
Start: 2024-01-11 | End: 2024-01-11 | Stop reason: HOSPADM

## 2024-01-11 RX ORDER — TRAMADOL HYDROCHLORIDE 50 MG/1
50 TABLET ORAL EVERY 6 HOURS PRN
Qty: 28 TABLET | Refills: 0 | Status: SHIPPED | OUTPATIENT
Start: 2024-01-11 | End: 2024-01-18

## 2024-01-11 RX ORDER — PROPOFOL 10 MG/ML
INJECTION, EMULSION INTRAVENOUS PRN
Status: DISCONTINUED | OUTPATIENT
Start: 2024-01-11 | End: 2024-01-11 | Stop reason: SDUPTHER

## 2024-01-11 RX ORDER — LIDOCAINE HYDROCHLORIDE 20 MG/ML
INJECTION, SOLUTION EPIDURAL; INFILTRATION; INTRACAUDAL; PERINEURAL PRN
Status: DISCONTINUED | OUTPATIENT
Start: 2024-01-11 | End: 2024-01-11 | Stop reason: SDUPTHER

## 2024-01-11 RX ORDER — SODIUM CHLORIDE 9 MG/ML
INJECTION, SOLUTION INTRAVENOUS PRN
Status: DISCONTINUED | OUTPATIENT
Start: 2024-01-11 | End: 2024-01-11 | Stop reason: HOSPADM

## 2024-01-11 RX ORDER — SODIUM CHLORIDE 0.9 % (FLUSH) 0.9 %
5-40 SYRINGE (ML) INJECTION EVERY 12 HOURS SCHEDULED
Status: DISCONTINUED | OUTPATIENT
Start: 2024-01-11 | End: 2024-01-11 | Stop reason: HOSPADM

## 2024-01-11 RX ORDER — METOCLOPRAMIDE HYDROCHLORIDE 5 MG/ML
10 INJECTION INTRAMUSCULAR; INTRAVENOUS
Status: DISCONTINUED | OUTPATIENT
Start: 2024-01-11 | End: 2024-01-11 | Stop reason: HOSPADM

## 2024-01-11 RX ORDER — MIDAZOLAM HYDROCHLORIDE 1 MG/ML
INJECTION INTRAMUSCULAR; INTRAVENOUS PRN
Status: DISCONTINUED | OUTPATIENT
Start: 2024-01-11 | End: 2024-01-11 | Stop reason: SDUPTHER

## 2024-01-11 RX ORDER — FENTANYL CITRATE 50 UG/ML
INJECTION, SOLUTION INTRAMUSCULAR; INTRAVENOUS PRN
Status: DISCONTINUED | OUTPATIENT
Start: 2024-01-11 | End: 2024-01-11 | Stop reason: SDUPTHER

## 2024-01-11 RX ORDER — ACETAMINOPHEN 500 MG
1000 TABLET ORAL ONCE
Status: COMPLETED | OUTPATIENT
Start: 2024-01-11 | End: 2024-01-11

## 2024-01-11 RX ORDER — FENTANYL CITRATE 0.05 MG/ML
25 INJECTION, SOLUTION INTRAMUSCULAR; INTRAVENOUS EVERY 5 MIN PRN
Status: DISCONTINUED | OUTPATIENT
Start: 2024-01-11 | End: 2024-01-11 | Stop reason: HOSPADM

## 2024-01-11 RX ORDER — ONDANSETRON 2 MG/ML
INJECTION INTRAMUSCULAR; INTRAVENOUS PRN
Status: DISCONTINUED | OUTPATIENT
Start: 2024-01-11 | End: 2024-01-11 | Stop reason: SDUPTHER

## 2024-01-11 RX ORDER — BUPIVACAINE HYDROCHLORIDE AND EPINEPHRINE 2.5; 5 MG/ML; UG/ML
INJECTION, SOLUTION EPIDURAL; INFILTRATION; INTRACAUDAL; PERINEURAL PRN
Status: DISCONTINUED | OUTPATIENT
Start: 2024-01-11 | End: 2024-01-11 | Stop reason: ALTCHOICE

## 2024-01-11 RX ORDER — LIDOCAINE HYDROCHLORIDE 10 MG/ML
1 INJECTION, SOLUTION EPIDURAL; INFILTRATION; INTRACAUDAL; PERINEURAL
Status: DISCONTINUED | OUTPATIENT
Start: 2024-01-11 | End: 2024-01-11 | Stop reason: HOSPADM

## 2024-01-11 RX ORDER — HYDRALAZINE HYDROCHLORIDE 20 MG/ML
10 INJECTION INTRAMUSCULAR; INTRAVENOUS
Status: DISCONTINUED | OUTPATIENT
Start: 2024-01-11 | End: 2024-01-11 | Stop reason: HOSPADM

## 2024-01-11 RX ORDER — CEFAZOLIN SODIUM 1 G/3ML
INJECTION, POWDER, FOR SOLUTION INTRAMUSCULAR; INTRAVENOUS PRN
Status: DISCONTINUED | OUTPATIENT
Start: 2024-01-11 | End: 2024-01-11 | Stop reason: SDUPTHER

## 2024-01-11 RX ORDER — DIPHENHYDRAMINE HYDROCHLORIDE 50 MG/ML
12.5 INJECTION INTRAMUSCULAR; INTRAVENOUS
Status: DISCONTINUED | OUTPATIENT
Start: 2024-01-11 | End: 2024-01-11 | Stop reason: HOSPADM

## 2024-01-11 RX ORDER — HYDROCODONE BITARTRATE AND ACETAMINOPHEN 5; 325 MG/1; MG/1
1 TABLET ORAL EVERY 6 HOURS PRN
Status: COMPLETED | OUTPATIENT
Start: 2024-01-11 | End: 2024-01-11

## 2024-01-11 RX ORDER — ROCURONIUM BROMIDE 10 MG/ML
INJECTION, SOLUTION INTRAVENOUS PRN
Status: DISCONTINUED | OUTPATIENT
Start: 2024-01-11 | End: 2024-01-11 | Stop reason: SDUPTHER

## 2024-01-11 RX ORDER — GABAPENTIN 300 MG/1
300 CAPSULE ORAL ONCE
Status: COMPLETED | OUTPATIENT
Start: 2024-01-11 | End: 2024-01-11

## 2024-01-11 RX ORDER — DEXAMETHASONE SODIUM PHOSPHATE 4 MG/ML
INJECTION, SOLUTION INTRA-ARTICULAR; INTRALESIONAL; INTRAMUSCULAR; INTRAVENOUS; SOFT TISSUE PRN
Status: DISCONTINUED | OUTPATIENT
Start: 2024-01-11 | End: 2024-01-11 | Stop reason: SDUPTHER

## 2024-01-11 RX ORDER — SODIUM CHLORIDE, SODIUM LACTATE, POTASSIUM CHLORIDE, CALCIUM CHLORIDE 600; 310; 30; 20 MG/100ML; MG/100ML; MG/100ML; MG/100ML
INJECTION, SOLUTION INTRAVENOUS CONTINUOUS
Status: DISCONTINUED | OUTPATIENT
Start: 2024-01-11 | End: 2024-01-11 | Stop reason: HOSPADM

## 2024-01-11 RX ADMIN — ROCURONIUM BROMIDE 50 MG: 10 INJECTION, SOLUTION INTRAVENOUS at 13:36

## 2024-01-11 RX ADMIN — DEXAMETHASONE SODIUM PHOSPHATE 8 MG: 4 INJECTION INTRA-ARTICULAR; INTRALESIONAL; INTRAMUSCULAR; INTRAVENOUS; SOFT TISSUE at 13:50

## 2024-01-11 RX ADMIN — SODIUM CHLORIDE, POTASSIUM CHLORIDE, SODIUM LACTATE AND CALCIUM CHLORIDE: 600; 310; 30; 20 INJECTION, SOLUTION INTRAVENOUS at 14:19

## 2024-01-11 RX ADMIN — MIDAZOLAM 2 MG: 1 INJECTION INTRAMUSCULAR; INTRAVENOUS at 13:29

## 2024-01-11 RX ADMIN — PROPOFOL 200 MG: 10 INJECTION, EMULSION INTRAVENOUS at 13:36

## 2024-01-11 RX ADMIN — SUGAMMADEX 200 MG: 100 INJECTION, SOLUTION INTRAVENOUS at 14:31

## 2024-01-11 RX ADMIN — ONDANSETRON 4 MG: 2 INJECTION INTRAMUSCULAR; INTRAVENOUS at 14:24

## 2024-01-11 RX ADMIN — HYDROCODONE BITARTRATE AND ACETAMINOPHEN 1 TABLET: 5; 325 TABLET ORAL at 16:00

## 2024-01-11 RX ADMIN — ACETAMINOPHEN 1000 MG: 500 TABLET ORAL at 11:44

## 2024-01-11 RX ADMIN — FENTANYL CITRATE 25 MCG: 0.05 INJECTION, SOLUTION INTRAMUSCULAR; INTRAVENOUS at 15:10

## 2024-01-11 RX ADMIN — ONDANSETRON 4 MG: 2 INJECTION INTRAMUSCULAR; INTRAVENOUS at 14:59

## 2024-01-11 RX ADMIN — LIDOCAINE HYDROCHLORIDE 100 MG: 20 INJECTION, SOLUTION EPIDURAL; INFILTRATION; INTRACAUDAL; PERINEURAL at 13:36

## 2024-01-11 RX ADMIN — FENTANYL CITRATE 50 MCG: 50 INJECTION, SOLUTION INTRAMUSCULAR; INTRAVENOUS at 13:36

## 2024-01-11 RX ADMIN — FENTANYL CITRATE 25 MCG: 50 INJECTION, SOLUTION INTRAMUSCULAR; INTRAVENOUS at 14:03

## 2024-01-11 RX ADMIN — GABAPENTIN 300 MG: 300 CAPSULE ORAL at 11:44

## 2024-01-11 RX ADMIN — FENTANYL CITRATE 25 MCG: 50 INJECTION, SOLUTION INTRAMUSCULAR; INTRAVENOUS at 14:38

## 2024-01-11 RX ADMIN — FENTANYL CITRATE 25 MCG: 0.05 INJECTION, SOLUTION INTRAMUSCULAR; INTRAVENOUS at 15:00

## 2024-01-11 RX ADMIN — CEFAZOLIN 2 G: 1 INJECTION, POWDER, FOR SOLUTION INTRAMUSCULAR; INTRAVENOUS at 13:49

## 2024-01-11 RX ADMIN — SODIUM CHLORIDE, POTASSIUM CHLORIDE, SODIUM LACTATE AND CALCIUM CHLORIDE: 600; 310; 30; 20 INJECTION, SOLUTION INTRAVENOUS at 11:55

## 2024-01-11 ASSESSMENT — PAIN DESCRIPTION - ORIENTATION
ORIENTATION: RIGHT;LEFT;POSTERIOR
ORIENTATION: LEFT

## 2024-01-11 ASSESSMENT — PAIN SCALES - GENERAL
PAINLEVEL_OUTOF10: 1
PAINLEVEL_OUTOF10: 6
PAINLEVEL_OUTOF10: 5

## 2024-01-11 ASSESSMENT — PAIN DESCRIPTION - LOCATION
LOCATION: BACK

## 2024-01-11 ASSESSMENT — PAIN - FUNCTIONAL ASSESSMENT
PAIN_FUNCTIONAL_ASSESSMENT: 0-10
PAIN_FUNCTIONAL_ASSESSMENT: NONE - DENIES PAIN

## 2024-01-11 ASSESSMENT — PAIN DESCRIPTION - DESCRIPTORS
DESCRIPTORS: DULL;ACHING
DESCRIPTORS: ACHING

## 2024-01-11 NOTE — BRIEF OP NOTE
Brief Postoperative Note      Patient: Leah Gonzalez  YOB: 1960  MRN: [id number]    Date of Procedure: 1/11/2024    Pre-Op Diagnosis Codes:     * Sacral insufficiency fracture, initial encounter [M84.48XA]    Post-Op Diagnosis: Same       Procedure(s):  KYPHOPLASTY LUMBAR SACRAL VERTEBROPLASY L5-S1    Surgeon(s):  Juno French MD    Assistant:  * No surgical staff found *    Anesthesia: General    Estimated Blood Loss (mL): Minimal    Complications: None    Specimens:   ID Type Source Tests Collected by Time Destination   A : SACRUM BONE BIOPSY Bone Back SURGICAL PATHOLOGY Juno French MD 1/11/2024 1406        Implants:  Implant Name Type Inv. Item Serial No.  Lot No. LRB No. Used Action   CEMENT BNE HI VISC RADIOPAQUE KYPHON HV-R - [id number]  CEMENT BNE HI VISC RADIOPAQUE KYPHON HV-R  [employer] SOFAMOR DANEK-WD [id number] N/A 1 Implanted         Drains: * No LDAs found *    Findings: see dictation      Electronically signed by Juno French MD on 1/11/2024 at 2:34 PM

## 2024-01-11 NOTE — ANESTHESIA POSTPROCEDURE EVALUATION
Department of Anesthesiology  Postprocedure Note    Patient: Leah Gonzalez  MRN: [id number]  YOB: 1960  Date of evaluation: 1/11/2024    Procedure Summary     Date: 01/11/24 Room / Location: [address]    Anesthesia Start: 1331 Anesthesia Stop: 1436    Procedure: KYPHOPLASTY LUMBAR SACRAL VERTEBROPLASTY L5-S1 (Back) Diagnosis:       Sacral insufficiency fracture, initial encounter      (Sacral insufficiency fracture, initial encounter [M84.48XA])    Surgeons: Juno French MD Responsible Provider: Diane Ware MD    Anesthesia Type: general ASA Status: 3          Anesthesia Type: No value filed.    Vaibhav Phase I: Vaibhav Score: 10    Vaibhav Phase II: Vaibhav Score: 10    Anesthesia Post Evaluation    Comments: POST- ANESTHESIA EVALUATION       Pt Name: Leah Gonzalez  MRN: [id number]  YOB: 1960  Date of evaluation: 1/11/2024  Time:  4:32 PM      BP (!) 159/88   Pulse 86   Temp 97 °F (36.1 °C) (Infrared)   Resp 16   Ht 1.575 m (5' 2.01\")   Wt 91.6 kg (202 lb)   SpO2 96%   BMI 36.94 kg/m²      Consciousness Level  Awake  Cardiopulmonary Status  Stable  Pain Adequately Treated YES  Nausea / Vomiting  NO  Adequate Hydration  YES  Anesthesia Related Complications NONE      Electronically signed by Diane Ware MD on 1/11/2024 at 4:32 PM        No notable events documented.

## 2024-01-11 NOTE — ANESTHESIA PRE PROCEDURE
Department of Anesthesiology  Preprocedure Note       Name:  Leah Gonzalez   Age:  63 y.o.  :  1960                                          MRN:  [id number]         Date:  2024      Surgeon: Surgeon(s):  Juno French MD    Procedure: Procedure(s):  KYPHOPLASTY LUMBAR SACRAL VERTEBROPLASY L5-S1    Medications prior to admission:   Prior to Admission medications    Medication Sig Start Date End Date Taking? Authorizing Provider   traMADol (ULTRAM) 50 MG tablet Take 1 tablet by mouth every 6 hours as needed for Pain.    ProviderEmmy MD   ALPRAZolam (XANAX) 0.5 MG tablet Take 1 tablet by mouth nightly as needed for Sleep.    ProviderEmmy MD   Biotin 300 MCG TABS Take 1 tablet by mouth    Emmy Chavez MD   rosuvastatin (CRESTOR) 5 MG tablet Take 1 tablet by mouth nightly 10/30/23   Patricia Quinn APRN - CNP   BLACK CURRANT SEED OIL PO Take by mouth 1 teaspoon daily    ProviderEmmy MD   Multiple Vitamins-Minerals (MULTIVITAMIN ADULTS 50+) TABS Take by mouth    Emmy Chavez MD       Current medications:    Current Facility-Administered Medications   Medication Dose Route Frequency Provider Last Rate Last Admin   • lidocaine PF 1 % injection 1 mL  1 mL IntraDERmal Once PRN Eric Banks MD       • lactated ringers IV soln infusion   IntraVENous Continuous Eric Banks  mL/hr at 24 1155 New Bag at 24 1155   • sodium chloride flush 0.9 % injection 5-40 mL  5-40 mL IntraVENous 2 times per day Eric Banks MD       • sodium chloride flush 0.9 % injection 5-40 mL  5-40 mL IntraVENous PRN Eric Banks MD       • 0.9 % sodium chloride infusion   IntraVENous PRN Eric Banks MD       • ceFAZolin (ANCEF) 2000 mg in 0.9% sodium chloride 50 mL IVPB  2,000 mg IntraVENous Once Juno French MD           Allergies:    Allergies   Allergen Reactions   • Atorvastatin Other (See Comments)     Causes Muscle cramps       Problem

## 2024-01-11 NOTE — DISCHARGE INSTRUCTIONS
DISCHARGE INSTRUCTIONS FOR KYPHOPLASTY    After a kyphoplasty to relieve pain from compression fractures, your back may feel sore where the needle went into your back.  This should go away in a few days.  In order to continue your care at home, please follow the instructions below.    For General Anesthesia  Do not drink any alcoholic beverages, make any legal or important decisions, or drive for 24 hours.      Diet    After general anesthesia, start out eating lightly (broth, soup, crackers, toast, etc.) advancing as tolerated to your usual diet.  Try to avoid spicy or greasy/fatty foods for 24 hours.    Drink plenty of fluids after surgery, unless you are on a fluid restriction. Avoid milk/milk product for several hours.    Medications  Take medications as instructed by your surgeon.   Please do not take prescribed pain medication with alcoholic beverages.  When cleared to drive by your surgeon, please do not drive or operate machinery while taking any prescribed pain medication.    Activities  As instructed by your surgeon.  Limit your activities for 24 hours.  Rest when you feel tired.  Getting enough sleep will help you recover.  Try to walk each day. Start by walking a little more than you did the day before. Bit by bit, increase the amount you walk. Walking boosts blood flow and helps prevent pneumonia and constipation.   Avoid lifting, pushing,or pulling anything that would make you strain until surgeon approves. This may include heavy grocery bags and milk containers, a heavy briefcase or backpack, cat litter or dog food bags, a vacuum , or a child.    Surgery Area  Always keep surgical site clean and dry  If covered with a dressing, you may remove it in 48 hours, unless instructed otherwise by your surgeon.  If you are sore where the needle was inserted, put ice or a cold pack on your back for 10 to 20 minutes at a time.  Try to do this every 1 to 2 hours for the next 3 days while awake or until the  swelling goes down.  Put a thin cloth between the ice and your skin.    Call your surgeon for the following:  You have pain that does not get better after you take pain medicine.  You have new or worse symptoms in your legs, belly, or buttocks such as numbness, tingling, weakness, or pain.    Call 911 if you are unable to move an leg/arm at all or have sudden chest pain, shortness of breath, or coughing up blood.  You lose bladder or bowel control.  For an oral temperature (by mouth) is 101 degrees or higher, chills, or excessive sweating.  You have increasing and progressive bleeding or drainage from surgery site.  Signs of an infection:  increased swelling, redness, warmth, hardness, or red streaks leading from around surgery area or yellow or green drainage from incision.  Persistent nausea or vomiting and can’t keep fluids down.  Redness or swelling at IV site.  If you are unable to urinate within 8 hours of surgery.  For any questions or concerns you may have.

## 2024-01-11 NOTE — OP NOTE
[address] [address]                                OPERATIVE REPORT    PATIENT NAME: MARSHA MACIAS                     :        1960  MED REC NO:   [id number]                              ROOM:  ACCOUNT NO:   [id number]                           ADMIT DATE: 2024  PROVIDER:     Juno French MD    DATE OF PROCEDURE:  2024    PREOPERATIVE DIAGNOSIS:  Sacral insufficiency fracture.    POSTOPERATIVE DIAGNOSIS:  Sacral insufficiency fracture.    OPERATION PERFORMED:  Lumbosacral vertebroplasty, L5 correction, and  lumbosacral vertebroplasty at S1.    OPERATING SURGEON:  Juno French MD    ASSISTANT:  None.    ANESTHESIA:  General.    ESTIMATED BLOOD LOSS:  Minimal.    COMPLICATIONS:  None.    SPECIMENS:  Sacral biopsy sent.    IMPLANTS:  Kyphon HV-R cement.    DRAINS:  None.    FINDINGS:  The patient with good cement fill interdigitation on the  left.  On the right, the patient had a leak and I think it occurred  after pressurization but before the cement had fully cured as I did not  notice it the leaking at the time we were injecting but noticed that  directly thereafter.    PROCEDURE IN DETAIL:  The patient was taken to the operating room,  placed under general anesthesia, transferred to the Damion table, and  checked for padding and positioning.  AP and lateral fluoroscopy were  arranged for the procedure.    Back was prepped and draped in the usual sterile fashion.    An 18-gauge spinal needles were advanced from the skin to the sacral  docking position.  Really just at the inferior aspect of the S1 foramen,  the patient had a quite tall S1.    At this juncture, I noticed that our lateral view was just slightly off.  We readjusted this just slightly.  At this point, x-rays look  satisfactory and the spinal needles where back injected with local  anesthetic.  Trocars were then introduced bilaterally with

## 2024-01-11 NOTE — INTERVAL H&P NOTE
Update History & Physical    The patient's History and Physical of January 10, 2024 was reviewed with the patient and I examined the patient. There was no change. Here today for KYPHOPLASTY LUMBAR SACRAL VERTEBROPLASTY L5-S1 per Dr. French.     Pt AAO x 3 in NAD. HRRR. No adventitious lung sounds. No respiratory distress. Pt ambulating with cane with antalgic gait. NPO p MN. Took ultram last night at 2300. No medications taken this am. Stopped vitamins/supplements three days ago. Denies taking any blood thinning medications. Denies recent or current chest pain/pressure, palpitations, SOB, recent URI, fever or chills. Review vitals per RN flowsheet.       Electronically signed by FRANK Wylie CNP on 1/11/2024 at 11:14 AM

## 2024-01-12 ENCOUNTER — TELEPHONE (OUTPATIENT)
Dept: GYNECOLOGIC ONCOLOGY | Age: 64
End: 2024-01-12

## 2024-01-12 ENCOUNTER — HOSPITAL ENCOUNTER (OUTPATIENT)
Dept: NUCLEAR MEDICINE | Age: 64
End: 2024-01-12
Attending: INTERNAL MEDICINE
Payer: COMMERCIAL

## 2024-01-12 DIAGNOSIS — C53.1 MALIGNANT NEOPLASM OF EXOCERVIX (HCC): ICD-10-CM

## 2024-01-12 LAB — GLUCOSE BLD-MCNC: 94 MG/DL (ref 65–105)

## 2024-01-12 PROCEDURE — 2580000003 HC RX 258: Performed by: INTERNAL MEDICINE

## 2024-01-12 PROCEDURE — 82947 ASSAY GLUCOSE BLOOD QUANT: CPT

## 2024-01-12 PROCEDURE — 78815 PET IMAGE W/CT SKULL-THIGH: CPT

## 2024-01-12 PROCEDURE — A9609 HC RX DIAGNOSTIC RADIOPHARMACEUTICAL: HCPCS | Performed by: INTERNAL MEDICINE

## 2024-01-12 PROCEDURE — 3430000000 HC RX DIAGNOSTIC RADIOPHARMACEUTICAL: Performed by: INTERNAL MEDICINE

## 2024-01-12 RX ORDER — FLUDEOXYGLUCOSE F 18 200 MCI/ML
10 INJECTION, SOLUTION INTRAVENOUS
Status: COMPLETED | OUTPATIENT
Start: 2024-01-12 | End: 2024-01-12

## 2024-01-12 RX ORDER — SODIUM CHLORIDE 0.9 % (FLUSH) 0.9 %
10 SYRINGE (ML) INJECTION
Status: COMPLETED | OUTPATIENT
Start: 2024-01-12 | End: 2024-01-12

## 2024-01-12 RX ADMIN — SODIUM CHLORIDE, PRESERVATIVE FREE 10 ML: 5 INJECTION INTRAVENOUS at 12:56

## 2024-01-12 RX ADMIN — FLUDEOXYGLUCOSE F 18 13.06 MILLICURIE: 200 INJECTION, SOLUTION INTRAVENOUS at 12:55

## 2024-01-16 LAB — SURGICAL PATHOLOGY REPORT: NORMAL

## 2024-01-23 ENCOUNTER — HOSPITAL ENCOUNTER (OUTPATIENT)
Dept: RADIATION ONCOLOGY | Age: 64
Discharge: HOME | End: 2024-01-23
Payer: COMMERCIAL

## 2024-01-23 VITALS
WEIGHT: 197.6 LBS | RESPIRATION RATE: 16 BRPM | BODY MASS INDEX: 36.13 KG/M2 | SYSTOLIC BLOOD PRESSURE: 145 MMHG | TEMPERATURE: 98.2 F | OXYGEN SATURATION: 99 % | DIASTOLIC BLOOD PRESSURE: 88 MMHG

## 2024-01-23 PROCEDURE — 99212 OFFICE O/P EST SF 10 MIN: CPT | Performed by: RADIOLOGY

## 2024-01-23 NOTE — PROGRESS NOTES
Leah BARTON Nail  1/23/2024  3:23 PM      Vitals:    01/23/24 1514   BP: (!) 145/88   Resp: 16   Temp: 98.2 °F (36.8 °C)   SpO2: 99%    :     Pain Assessment: None - Denies Pain          Wt Readings from Last 1 Encounters:   01/23/24 89.6 kg (197 lb 9.6 oz)                Current Outpatient Medications:     traMADol (ULTRAM) 50 MG tablet, Take 1 tablet by mouth every 6 hours as needed for Pain., Disp: , Rfl:     ALPRAZolam (XANAX) 0.5 MG tablet, Take 1 tablet by mouth nightly as needed for Sleep., Disp: , Rfl:     Biotin 300 MCG TABS, Take 1 tablet by mouth, Disp: , Rfl:     rosuvastatin (CRESTOR) 5 MG tablet, Take 1 tablet by mouth nightly, Disp: 90 tablet, Rfl: 1    BLACK CURRANT SEED OIL PO, Take by mouth 1 teaspoon daily, Disp: , Rfl:     Multiple Vitamins-Minerals (MULTIVITAMIN ADULTS 50+) TABS, Take by mouth, Disp: , Rfl:         FALLS RISK SCREEN  Instructions:  Assess the patient and enter the appropriate indicators that are present for fall risk identification.   Total the numbers entered and assign a fall risk score from Table 2.  Reassess patient at a minimum every 12 weeks or with status change.    Assessment   Date  1/23/2024     1.  Mental Ability: confusion/cognitively impaired 0     2.  Elimination Issues: incontinence, frequency 0       3.  Ambulatory: use of assistive devices (walker, cane, off-loading devices),        attached to equipment (IV pole, oxygen) 0     4.  Sensory Limitations: dizziness, vertigo, impaired vision 0     5.  Age less than 65        0     6.  Age 65 or greater 0     7.  Medication: diuretics, strong analgesics, hypnotics, sedatives,        antihypertensive agents 0   8.  Falls:  recent history of falls within the last 3 months (not to include slipping or        tripping) 0   TOTAL 0    If score of 4 or greater was education given? No           TABLE 2   Risk Score Risk Level Plan of Care   0-3 Little or  No Risk 1.  Provide assistance as indicated for ambulation 
  New Prescriptions    No medications on file       Orders: No orders of the defined types were placed in this encounter.      CC:  Patient Care Team:  Patricia Quinn APRN - CNP as PCP - General (Certified Nurse Practitioner)  Patricia Quinn APRN - CNP as PCP - Empaneled Provider  Ellie Beal RN as Nurse Navigator (Oncology)     Total time spent on this case on the day of encounter is 20 minutes. This time includes combination of one or more of the following - review of necessary tests, review of pertinent medical records from the EMR, performing medically appropriate examination and evaluation, counseling and educating the patient/family/caregiver, ordering necessary medical tests, procedures etc., documenting the clinical information in the electronic medical record, care coordination, referring and communicating with other health care providers and interpretation of results independently. This note is created with the assistance of a speech recognition program.  While intending to generate a document that actually reflects the content of the visit, the document can still have some errors including those of syntax and sound a like substitutions which may escape proof reading.  It such instances, actual meaning can be extrapolated by contextual diversion.

## 2024-01-25 ENCOUNTER — OFFICE VISIT (OUTPATIENT)
Dept: ORTHOPEDIC SURGERY | Age: 64
End: 2024-01-25
Payer: COMMERCIAL

## 2024-01-25 VITALS — RESPIRATION RATE: 14 BRPM | WEIGHT: 197.53 LBS | BODY MASS INDEX: 36.35 KG/M2 | HEIGHT: 62 IN

## 2024-01-25 DIAGNOSIS — M84.48XG SACRAL INSUFFICIENCY FRACTURE WITH DELAYED HEALING, SUBSEQUENT ENCOUNTER: Primary | ICD-10-CM

## 2024-01-25 PROCEDURE — 99213 OFFICE O/P EST LOW 20 MIN: CPT | Performed by: ORTHOPAEDIC SURGERY

## 2024-01-25 RX ORDER — TRAMADOL HYDROCHLORIDE 50 MG/1
50 TABLET ORAL EVERY 6 HOURS PRN
Qty: 28 TABLET | Refills: 0 | Status: SHIPPED | OUTPATIENT
Start: 2024-01-25 | End: 2024-02-01

## 2024-01-25 NOTE — PROGRESS NOTES
discharge instructions and agree that the records reflect my personal performance and is accurate and complete. Juno French MD 1/25/24       Scribe Attestation:  By signing my name below, I, Alexis Devries, attest that this documentation has been prepared under the direction and in the presence of Dr. Jnuo French. Electronically signed: Torri New, 1/25/24     Please note that this chart was generated using voice recognition Dragon dictation software.  Although every effort was made to ensure the accuracy of this automated transcription, some errors in transcription may have occurred.

## 2024-01-29 ENCOUNTER — HOSPITAL ENCOUNTER (OUTPATIENT)
Age: 64
Discharge: HOME | End: 2024-01-29
Payer: COMMERCIAL

## 2024-01-29 ENCOUNTER — TELEPHONE (OUTPATIENT)
Dept: FAMILY MEDICINE CLINIC | Age: 64
End: 2024-01-29

## 2024-01-29 DIAGNOSIS — R73.01 IFG (IMPAIRED FASTING GLUCOSE): ICD-10-CM

## 2024-01-29 LAB
EST. AVERAGE GLUCOSE BLD GHB EST-MCNC: 111 MG/DL
HBA1C MFR BLD: 5.5 % (ref 4–6)

## 2024-01-29 PROCEDURE — 83036 HEMOGLOBIN GLYCOSYLATED A1C: CPT

## 2024-01-29 PROCEDURE — 36415 COLL VENOUS BLD VENIPUNCTURE: CPT

## 2024-01-29 NOTE — TELEPHONE ENCOUNTER
----- Message from FRANK Cortes CNP sent at 1/29/2024  3:46 PM EST -----  Had colonoscopy done in November by dr. Alicia can we please get the records

## 2024-01-31 ENCOUNTER — OFFICE VISIT (OUTPATIENT)
Dept: FAMILY MEDICINE CLINIC | Age: 64
End: 2024-01-31
Payer: COMMERCIAL

## 2024-01-31 VITALS
HEIGHT: 62 IN | WEIGHT: 198 LBS | SYSTOLIC BLOOD PRESSURE: 134 MMHG | HEART RATE: 105 BPM | TEMPERATURE: 97.7 F | OXYGEN SATURATION: 95 % | DIASTOLIC BLOOD PRESSURE: 82 MMHG | BODY MASS INDEX: 36.44 KG/M2

## 2024-01-31 DIAGNOSIS — F41.0 PANIC DISORDER: Primary | ICD-10-CM

## 2024-01-31 DIAGNOSIS — E78.5 HYPERLIPIDEMIA, UNSPECIFIED HYPERLIPIDEMIA TYPE: ICD-10-CM

## 2024-01-31 DIAGNOSIS — R73.01 IFG (IMPAIRED FASTING GLUCOSE): ICD-10-CM

## 2024-01-31 DIAGNOSIS — M25.552 LEFT HIP PAIN: ICD-10-CM

## 2024-01-31 DIAGNOSIS — M84.48XA SACRAL INSUFFICIENCY FRACTURE, INITIAL ENCOUNTER: ICD-10-CM

## 2024-01-31 PROCEDURE — 99213 OFFICE O/P EST LOW 20 MIN: CPT | Performed by: NURSE PRACTITIONER

## 2024-01-31 PROCEDURE — 3079F DIAST BP 80-89 MM HG: CPT | Performed by: NURSE PRACTITIONER

## 2024-01-31 PROCEDURE — 3075F SYST BP GE 130 - 139MM HG: CPT | Performed by: NURSE PRACTITIONER

## 2024-01-31 RX ORDER — ALPRAZOLAM 0.5 MG/1
0.5 TABLET ORAL NIGHTLY PRN
Qty: 30 TABLET | Refills: 0 | Status: SHIPPED | OUTPATIENT
Start: 2024-01-31 | End: 2024-03-01

## 2024-01-31 ASSESSMENT — ENCOUNTER SYMPTOMS
COUGH: 0
NAUSEA: 0
COLOR CHANGE: 0
RHINORRHEA: 0
SORE THROAT: 0
BACK PAIN: 0
ABDOMINAL PAIN: 0
DIARRHEA: 0
SHORTNESS OF BREATH: 0
CONSTIPATION: 0
ABDOMINAL DISTENTION: 0
CHEST TIGHTNESS: 0

## 2024-01-31 ASSESSMENT — PATIENT HEALTH QUESTIONNAIRE - PHQ9
SUM OF ALL RESPONSES TO PHQ QUESTIONS 1-9: 0
1. LITTLE INTEREST OR PLEASURE IN DOING THINGS: 0
SUM OF ALL RESPONSES TO PHQ QUESTIONS 1-9: 0
SUM OF ALL RESPONSES TO PHQ9 QUESTIONS 1 & 2: 0
SUM OF ALL RESPONSES TO PHQ QUESTIONS 1-9: 0
SUM OF ALL RESPONSES TO PHQ QUESTIONS 1-9: 0
2. FEELING DOWN, DEPRESSED OR HOPELESS: 0

## 2024-01-31 NOTE — PROGRESS NOTES
Patricia Quinn, FRANK-CNP  [facility] MEDICINE  [address]  [facility] [address]  Dept: [phone]  Dept Fax: [phone]     Patient ID: Leah Gonzalez is a 63 y.o. female Established patient    HPI    Pt here today for f/u on chronic medical problems, go over labs and/or diagnostic studies, and medication refills. Pt denies any fever or chills.  Pt today denies any HA, chest pain, or SOB.  Pt denies any N/V/D/C or abdominal pain.    She is able to walk and pain is minimal since having back surgery.     - she recently seen rad/ onc and would like her on vit D/ calcium, having a bone scan on 2/1/2024    Otherwise pt doing well on current tx and no other concerns today.     The patient's past medical, surgical, social, and family history as well as his current medications and allergies were reviewed as documented in today's encounter by RAJINDER Gilliam.      Previous office notes, labs, imaging and hospital records were reviewed prior to and during encounter.    Current Outpatient Medications on File Prior to Visit   Medication Sig Dispense Refill    traMADol (ULTRAM) 50 MG tablet Take 1 tablet by mouth every 6 hours as needed for Pain for up to 7 days. Intended supply: 7 days. Take lowest dose possible to manage pain Max Daily Amount: 200 mg 28 tablet 0    ALPRAZolam (XANAX) 0.5 MG tablet Take 1 tablet by mouth nightly as needed for Sleep.      Biotin 300 MCG TABS Take 1 tablet by mouth      rosuvastatin (CRESTOR) 5 MG tablet Take 1 tablet by mouth nightly 90 tablet 1    BLACK CURRANT SEED OIL PO Take by mouth 1 teaspoon daily      Multiple Vitamins-Minerals (MULTIVITAMIN ADULTS 50+) TABS Take by mouth      traMADol (ULTRAM) 50 MG tablet Take 1 tablet by mouth every 6 hours as needed for Pain.       No current facility-administered medications on file prior to visit.        Subjective:     Review of Systems   Constitutional:  Negative for activity change, fatigue

## 2024-02-01 ENCOUNTER — OFFICE VISIT (OUTPATIENT)
Dept: GYNECOLOGIC ONCOLOGY | Age: 64
End: 2024-02-01

## 2024-02-01 VITALS
WEIGHT: 198.2 LBS | BODY MASS INDEX: 36.25 KG/M2 | TEMPERATURE: 98.2 F | SYSTOLIC BLOOD PRESSURE: 135 MMHG | DIASTOLIC BLOOD PRESSURE: 75 MMHG | OXYGEN SATURATION: 97 % | HEART RATE: 102 BPM

## 2024-02-01 DIAGNOSIS — Z92.21 HISTORY OF CHEMOTHERAPY: ICD-10-CM

## 2024-02-01 DIAGNOSIS — C53.1 MALIGNANT NEOPLASM OF EXOCERVIX (HCC): Primary | ICD-10-CM

## 2024-02-01 DIAGNOSIS — Z92.3 HISTORY OF RADIATION THERAPY: ICD-10-CM

## 2024-02-01 DIAGNOSIS — M84.48XG SACRAL INSUFFICIENCY FRACTURE WITH DELAYED HEALING, SUBSEQUENT ENCOUNTER: ICD-10-CM

## 2024-02-01 DIAGNOSIS — E83.42 HYPOMAGNESEMIA: ICD-10-CM

## 2024-02-03 ENCOUNTER — HOSPITAL ENCOUNTER (OUTPATIENT)
Dept: MAMMOGRAPHY | Age: 64
End: 2024-02-03
Payer: COMMERCIAL

## 2024-02-03 DIAGNOSIS — C53.1 MALIGNANT NEOPLASM OF EXOCERVIX (HCC): ICD-10-CM

## 2024-02-03 DIAGNOSIS — M84.48XG SACRAL INSUFFICIENCY FRACTURE WITH DELAYED HEALING: ICD-10-CM

## 2024-02-03 PROCEDURE — 77080 DXA BONE DENSITY AXIAL: CPT

## 2024-02-08 DIAGNOSIS — M80.00XA AGE-RELATED OSTEOPOROSIS WITH CURRENT PATHOLOGICAL FRACTURE, INITIAL ENCOUNTER: Primary | ICD-10-CM

## 2024-02-08 RX ORDER — ALENDRONATE SODIUM 70 MG/1
70 TABLET ORAL
Qty: 12 TABLET | Refills: 1 | Status: SHIPPED | OUTPATIENT
Start: 2024-02-08

## 2024-02-08 NOTE — PROGRESS NOTES
- DEXA shows osteoporosis in lumbar spine, with a T-score of -3.0  - DEXA shows osteoporosis in left femoral neck, with a T-score of -2.5  - DEXA shows osteopenia in left hip, with a T- score of 1.8  - Will have pt start Calcium/Vit D 600mg BID and increase weight bearing exercise   - Will start Fosamax 70 mg weekly.   - Will recheck DEXA in 2 years.

## 2024-02-08 NOTE — PROGRESS NOTES
Per pcp   Please have pt start calcium/vit D 600 mg BID, increase weight bearing exercise and start Fosamax   Called pt and informed her. She states understanding.

## 2024-02-13 ENCOUNTER — TELEPHONE (OUTPATIENT)
Dept: ONCOLOGY | Age: 64
End: 2024-02-13

## 2024-02-13 NOTE — TELEPHONE ENCOUNTER
Name: Leah Gonzalez  : 1960  MRN: [id number]    Oncology Navigation Follow-Up Note    Contact Type:  Telephone    Notes: Attempted to contact pt for f/u call, no answer, VM left encouraged to contact writer prn.  Will continue to follow.      Electronically signed by Ellie Beal RN on 2024 at 3:18 PM

## 2024-02-22 ENCOUNTER — HOSPITAL ENCOUNTER (OUTPATIENT)
Dept: VASCULAR LAB | Age: 64
Discharge: HOME | End: 2024-02-24
Attending: ORTHOPAEDIC SURGERY
Payer: COMMERCIAL

## 2024-02-22 ENCOUNTER — TELEPHONE (OUTPATIENT)
Dept: ORTHOPEDIC SURGERY | Age: 64
End: 2024-02-22

## 2024-02-22 ENCOUNTER — OFFICE VISIT (OUTPATIENT)
Dept: ORTHOPEDIC SURGERY | Age: 64
End: 2024-02-22
Payer: COMMERCIAL

## 2024-02-22 VITALS — BODY MASS INDEX: 36.47 KG/M2 | RESPIRATION RATE: 14 BRPM | HEIGHT: 62 IN | WEIGHT: 198.19 LBS

## 2024-02-22 DIAGNOSIS — M79.662 PAIN OF LEFT CALF: ICD-10-CM

## 2024-02-22 DIAGNOSIS — M25.552 LEFT HIP PAIN: ICD-10-CM

## 2024-02-22 DIAGNOSIS — M84.48XG SACRAL INSUFFICIENCY FRACTURE WITH DELAYED HEALING, SUBSEQUENT ENCOUNTER: Primary | ICD-10-CM

## 2024-02-22 LAB — ECHO BSA: 1.98 M2

## 2024-02-22 PROCEDURE — 99213 OFFICE O/P EST LOW 20 MIN: CPT | Performed by: ORTHOPAEDIC SURGERY

## 2024-02-22 PROCEDURE — 93970 EXTREMITY STUDY: CPT

## 2024-02-22 RX ORDER — PHENOL 1.4 %
1 AEROSOL, SPRAY (ML) MUCOUS MEMBRANE DAILY
COMMUNITY

## 2024-02-22 NOTE — TELEPHONE ENCOUNTER
Radha from the [facility] calls in requesting information regarding doppler order. Radha inquires if doppler should be for bilateral extremity. Writer confirms with Dr. French that order should be for bilateral lower extremity. Radha voices understanding.

## 2024-02-22 NOTE — PROGRESS NOTES
Patient ID: Leah Gonzalez is a 63 y.o. female    Chief Compliant:  Chief Complaint   Patient presents with    Lower Back Pain     L5-S1        Diagnostic imaging:        Assessment and Plan:  1. Sacral insufficiency fracture with delayed healing, subsequent encounter    2. Left hip pain    3. Pain of left calf        6 weeks post sacral kyphoplasty, 1/11/2024.  For a left sacral insufficiency fracture .  Patient's presenting problem was predominately left hip and leg pain .  Interval she reports pain in her some pain in her left buttock but reports that it is slowly improving.     Overall patient is making acceptable progress however her left calf tenderness is a bit concerning    She also complains of pain in her left calve and is tender to palpate    Duplex Ultrasound left leg    Follow up 4 weeks    HPI:  This is a 63 y.o. female who presents to the clinic today for 6 weeks post sacral kyphoplasty, 1/11/2024.     Patient reports that her lower back pain is improving.     She reports pain in her left calve which is tender to palpate. She also notes pain in her left buttock    Review of Systems   All other systems reviewed and are negative.      Past History:    Current Outpatient Medications:     calcium carbonate 600 MG TABS tablet, Take 1 tablet by mouth daily, Disp: , Rfl:     alendronate (FOSAMAX) 70 MG tablet, Take 1 tablet by mouth every 7 days, Disp: 12 tablet, Rfl: 1    ALPRAZolam (XANAX) 0.5 MG tablet, Take 1 tablet by mouth nightly as needed for Sleep or Anxiety for up to 30 days. Max Daily Amount: 0.5 mg, Disp: 30 tablet, Rfl: 0    Biotin 300 MCG TABS, Take 1 tablet by mouth, Disp: , Rfl:     rosuvastatin (CRESTOR) 5 MG tablet, Take 1 tablet by mouth nightly, Disp: 90 tablet, Rfl: 1    BLACK CURRANT SEED OIL PO, Take by mouth 1 teaspoon daily, Disp: , Rfl:     Multiple Vitamins-Minerals (MULTIVITAMIN ADULTS 50+) TABS, Take by mouth, Disp: , Rfl:   Allergies   Allergen Reactions    Atorvastatin Other (See

## 2024-03-19 ENCOUNTER — TELEPHONE (OUTPATIENT)
Dept: FAMILY MEDICINE CLINIC | Age: 64
End: 2024-03-19

## 2024-03-19 NOTE — TELEPHONE ENCOUNTER
Can't prescribe pain medication.  Offer an appt this week, she recently had spine surgery in January need to make sure nothing else is going on or recommend to follow up with who did her surgery.

## 2024-03-19 NOTE — TELEPHONE ENCOUNTER
Patients sciatica is causing her a lot of pain. (Left leg) Symptoms started about 2 week ago. Patient is asking if PCP has a suggestion to help with the pain.     Symptoms:   - burning   - numbing    - tingling     Please advise.

## 2024-03-21 ENCOUNTER — TELEPHONE (OUTPATIENT)
Dept: ORTHOPEDIC SURGERY | Age: 64
End: 2024-03-21

## 2024-03-21 NOTE — TELEPHONE ENCOUNTER
Received call pt requesting to r/s no show appt on 3/21/2024 w/ Dr. French - pt stated she forgot about appt. Pt stated she is experiencing left leg pain, burning & numbness for the last 2 months.     Pt declined on 3/21/2024 in [place] with Karen in the afternoon. Pt stated she can only go to the [place] office. Pt is scheduled for 3/28/2024 w/ Karen in [place].     Pt would like to know what she should do for this issue in the mean time? Is there something that can be called in? Should she take Tylenol?    Please advise.    Call back#: [phone]

## 2024-03-28 ENCOUNTER — OFFICE VISIT (OUTPATIENT)
Dept: ORTHOPEDIC SURGERY | Age: 64
End: 2024-03-28
Payer: COMMERCIAL

## 2024-03-28 VITALS — HEIGHT: 62 IN | BODY MASS INDEX: 36.44 KG/M2 | WEIGHT: 198 LBS

## 2024-03-28 DIAGNOSIS — M54.16 LUMBAR BACK PAIN WITH RADICULOPATHY AFFECTING LEFT LOWER EXTREMITY: Primary | ICD-10-CM

## 2024-03-28 PROCEDURE — 99213 OFFICE O/P EST LOW 20 MIN: CPT | Performed by: PHYSICIAN ASSISTANT

## 2024-03-28 RX ORDER — GABAPENTIN 300 MG/1
300 CAPSULE ORAL 3 TIMES DAILY
Qty: 90 CAPSULE | Refills: 0 | Status: SHIPPED | OUTPATIENT
Start: 2024-03-28 | End: 2024-04-27

## 2024-03-28 RX ORDER — CYCLOBENZAPRINE HCL 10 MG
10 TABLET ORAL 3 TIMES DAILY PRN
Qty: 21 TABLET | Refills: 0 | Status: SHIPPED | OUTPATIENT
Start: 2024-03-28 | End: 2024-04-07

## 2024-03-28 NOTE — PROGRESS NOTES
Patient ID: Leah Gonzalez is a 63 y.o. female    Chief Compliant:  Chief Complaint   Patient presents with    Post-Op Check     Kypho lumbar sacral      HPI:  This is a 63 y.o. female who presents to the clinic today for 3-month follow-up of left sacral insufficiency fracture with sacral kyphoplasty done on 1/11/2024 by Dr. French.  Patient continues to have some left leg numbness and tingling from her knee to toes.  Patient was also experiencing the symptoms about a month and a half ago she says that overall they have improved slightly she has not done any other conservative treatment at this time.       Review of Systems   All other systems reviewed and are negative.    Past History:    Current Outpatient Medications:     cyclobenzaprine (FLEXERIL) 10 MG tablet, Take 1 tablet by mouth 3 times daily as needed for Muscle spasms Take at night for muscle spasms, Disp: 21 tablet, Rfl: 0    gabapentin (NEURONTIN) 300 MG capsule, Take 1 capsule by mouth 3 times daily for 30 days. Intended supply: 30 days, Disp: 90 capsule, Rfl: 0    calcium carbonate 600 MG TABS tablet, Take 1 tablet by mouth daily, Disp: , Rfl:     alendronate (FOSAMAX) 70 MG tablet, Take 1 tablet by mouth every 7 days, Disp: 12 tablet, Rfl: 1    Biotin 300 MCG TABS, Take 1 tablet by mouth, Disp: , Rfl:     rosuvastatin (CRESTOR) 5 MG tablet, Take 1 tablet by mouth nightly, Disp: 90 tablet, Rfl: 1    BLACK CURRANT SEED OIL PO, Take by mouth 1 teaspoon daily, Disp: , Rfl:     Multiple Vitamins-Minerals (MULTIVITAMIN ADULTS 50+) TABS, Take by mouth, Disp: , Rfl:   Allergies   Allergen Reactions    Atorvastatin Other (See Comments)     Causes Muscle cramps     Social History     Socioeconomic History    Marital status:      Spouse name: Not on file    Number of children: Not on file    Years of education: Not on file    Highest education level: Not on file   Occupational History    Not on file   Tobacco Use    Smoking status: Former     Current

## 2024-03-29 ENCOUNTER — TELEPHONE (OUTPATIENT)
Dept: ORTHOPEDIC SURGERY | Age: 64
End: 2024-03-29

## 2024-03-29 NOTE — TELEPHONE ENCOUNTER
Pt called to verify how to take her meds, gabapentin and flexeril.  Instructions given and pt understands.

## 2024-04-09 ENCOUNTER — HOSPITAL ENCOUNTER (OUTPATIENT)
Age: 64
Setting detail: SPECIMEN
Discharge: HOME | End: 2024-04-09
Payer: COMMERCIAL

## 2024-04-09 ENCOUNTER — HOSPITAL ENCOUNTER (OUTPATIENT)
Dept: PHYSICAL THERAPY | Facility: CLINIC | Age: 64
Setting detail: THERAPIES SERIES
Discharge: HOME | End: 2024-04-09
Payer: COMMERCIAL

## 2024-04-09 ENCOUNTER — TELEPHONE (OUTPATIENT)
Dept: ORTHOPEDIC SURGERY | Age: 64
End: 2024-04-09

## 2024-04-09 DIAGNOSIS — C53.8 MALIGNANT NEOPLASM OF OVERLAPPING SITES OF CERVIX UTERI (HCC): ICD-10-CM

## 2024-04-09 LAB
ALBUMIN SERPL-MCNC: 4.1 G/DL (ref 3.5–5.2)
ALP SERPL-CCNC: 173 U/L (ref 35–104)
ALT SERPL-CCNC: 22 U/L (ref 5–33)
ANION GAP SERPL CALCULATED.3IONS-SCNC: 10 MMOL/L (ref 9–17)
AST SERPL-CCNC: 20 U/L
BASOPHILS # BLD: <0.03 K/UL (ref 0–0.2)
BASOPHILS NFR BLD: 0 % (ref 0–2)
BILIRUB SERPL-MCNC: 0.2 MG/DL (ref 0.3–1.2)
BUN SERPL-MCNC: 16 MG/DL (ref 8–23)
BUN/CREAT SERPL: 20 (ref 9–20)
CALCIUM SERPL-MCNC: 9.3 MG/DL (ref 8.6–10.4)
CHLORIDE SERPL-SCNC: 100 MMOL/L (ref 98–107)
CO2 SERPL-SCNC: 27 MMOL/L (ref 20–31)
CREAT SERPL-MCNC: 0.8 MG/DL (ref 0.5–0.9)
EOSINOPHIL # BLD: 0.13 K/UL (ref 0–0.44)
EOSINOPHILS RELATIVE PERCENT: 2 % (ref 1–4)
ERYTHROCYTE [DISTWIDTH] IN BLOOD BY AUTOMATED COUNT: 14.3 % (ref 11.8–14.4)
FERRITIN SERPL-MCNC: 264 NG/ML (ref 13–150)
FOLATE SERPL-MCNC: >20 NG/ML (ref 4.8–24.2)
GFR SERPL CREATININE-BSD FRML MDRD: 83 ML/MIN/1.73M2
GLUCOSE SERPL-MCNC: 152 MG/DL (ref 70–99)
HCT VFR BLD AUTO: 34.9 % (ref 36.3–47.1)
HGB BLD-MCNC: 11.5 G/DL (ref 11.9–15.1)
IMM GRANULOCYTES # BLD AUTO: 0.03 K/UL (ref 0–0.3)
IMM GRANULOCYTES NFR BLD: 1 %
IRON SATN MFR SERPL: 22 % (ref 20–55)
IRON SERPL-MCNC: 49 UG/DL (ref 37–145)
LYMPHOCYTES NFR BLD: 0.9 K/UL (ref 1.1–3.7)
LYMPHOCYTES RELATIVE PERCENT: 14 % (ref 24–43)
MCH RBC QN AUTO: 29 PG (ref 25.2–33.5)
MCHC RBC AUTO-ENTMCNC: 33 G/DL (ref 28.4–34.8)
MCV RBC AUTO: 88.1 FL (ref 82.6–102.9)
MONOCYTES NFR BLD: 0.6 K/UL (ref 0.1–1.2)
MONOCYTES NFR BLD: 9 % (ref 3–12)
NEUTROPHILS NFR BLD: 74 % (ref 36–65)
NEUTS SEG NFR BLD: 4.76 K/UL (ref 1.5–8.1)
NRBC BLD-RTO: 0 PER 100 WBC
PLATELET # BLD AUTO: 184 K/UL (ref 138–453)
PMV BLD AUTO: 10.5 FL (ref 8.1–13.5)
POTASSIUM SERPL-SCNC: 4.1 MMOL/L (ref 3.7–5.3)
PROT SERPL-MCNC: 7.8 G/DL (ref 6.4–8.3)
RBC # BLD AUTO: 3.96 M/UL (ref 3.95–5.11)
SODIUM SERPL-SCNC: 137 MMOL/L (ref 135–144)
TIBC SERPL-MCNC: 227 UG/DL (ref 250–450)
UNSATURATED IRON BINDING CAPACITY: 178 UG/DL (ref 112–347)
VIT B12 SERPL-MCNC: 581 PG/ML (ref 232–1245)
WBC OTHER # BLD: 6.4 K/UL (ref 3.5–11.3)

## 2024-04-09 PROCEDURE — 82728 ASSAY OF FERRITIN: CPT

## 2024-04-09 PROCEDURE — 83550 IRON BINDING TEST: CPT

## 2024-04-09 PROCEDURE — 97110 THERAPEUTIC EXERCISES: CPT

## 2024-04-09 PROCEDURE — 36415 COLL VENOUS BLD VENIPUNCTURE: CPT

## 2024-04-09 PROCEDURE — 83540 ASSAY OF IRON: CPT

## 2024-04-09 PROCEDURE — 80053 COMPREHEN METABOLIC PANEL: CPT

## 2024-04-09 PROCEDURE — 85025 COMPLETE CBC W/AUTO DIFF WBC: CPT

## 2024-04-09 PROCEDURE — 97161 PT EVAL LOW COMPLEX 20 MIN: CPT

## 2024-04-09 PROCEDURE — 82607 VITAMIN B-12: CPT

## 2024-04-09 PROCEDURE — 82746 ASSAY OF FOLIC ACID SERUM: CPT

## 2024-04-09 NOTE — TELEPHONE ENCOUNTER
Script scanned into chart and then e-faxed along with most recent insurance card and this TE to Jerald MERCHANT at [facility] with patient informing her of the brace order and provided her with Jerald's name and phone number. I also sent an email to Jerald informing him of the fax and requesting that he reach out the patient to facilitate getting brace to her.

## 2024-04-09 NOTE — CONSULTS
restrictive AD and improved gait mechanics for ease of community access.  []  []  []     4. Pt to demonstrate ability to ascend and descend a full flight of stairs with reciprocal stepping mechanics to ease access to basement for laundry.  [] [] []    5. Pt to maintain tandem stance for at least 20\" with LLE posterior demonstrating appropriate postural stability for safety with gait.  [] [] []        Patient goals: reduce pain, improve function    Rehab Potential:  [x] Good  [] Fair  [] Poor   Suggested Professional Referral:  [x] No  [] Yes:   Barriers to Goal Achievement:  [x] No  [] Yes:  Domestic Concerns:  [x] No  [] Yes:    Pt. Education:  [x] Plans/Goals, Risks/Benefits discussed  [x] Home exercise program  Method of Education: [x] Verbal  [x] Demo  [x] Written  Access Code: [email or web address]  URL: [email or web address]  Date: 04/09/2024  Prepared by: Vane Husain    Exercises  - Supine Transversus Abdominis Bracing - Hands on Stomach  - 1 x daily - 7 x weekly - 3 sets - 10 reps  - Supine Sciatic Nerve Glide  - 1 x daily - 7 x weekly - 5 reps - 5\" hold  - Lying Prone  - 1 x daily - 7 x weekly - 2 reps - 1' hold  - Static Prone on Elbows  - 1 x daily - 7 x weekly - 2 reps - 1' hold  - Seated Sciatic Tensioner  - 1 x daily - 7 x weekly - 5 reps - 5\" hold    Discussed benefit of possible AFO for foot drop, cane for safety     Comprehension of Education:  [x] Verbalizes understanding.  [x] Demonstrates understanding.  [x] Needs Review.  [] Demonstrates/verbalizes understanding of HEP/Ed previously given.    Treatment Plan:  [x] Therapeutic Exercise   93190  [] Iontophoresis: 4 mg/mL Dexamethasone Sodium Phosphate  mAmin  87939   [x] Therapeutic Activity  00502 [] Vasopneumatic cold with compression  80258    [x] Gait Training   63483 [] Ultrasound   74491   [x] Neuromuscular Re-education  99198 [] Electrical Stimulation Unattended  36785   [x] Manual Therapy  48836 [] Electrical Stimulation Attended  55887

## 2024-04-09 NOTE — TELEPHONE ENCOUNTER
Was contacted by physical therapy in regards to patient today that she was having trouble with active dorsiflexion of her left foot which patient has had multiple falls and told physical therapy that she had trouble lifting her foot at times resulting in these falls.  Physical therapist requesting orthotic AFO as patient would like to do this option.  We will attempt to get patient a prescription for a left AFO and see patient back on follow-up.

## 2024-04-12 ENCOUNTER — OFFICE VISIT (OUTPATIENT)
Dept: ONCOLOGY | Age: 64
End: 2024-04-12
Payer: COMMERCIAL

## 2024-04-12 ENCOUNTER — TELEPHONE (OUTPATIENT)
Dept: ONCOLOGY | Age: 64
End: 2024-04-12

## 2024-04-12 VITALS
TEMPERATURE: 97.1 F | WEIGHT: 196.6 LBS | BODY MASS INDEX: 35.95 KG/M2 | HEART RATE: 96 BPM | RESPIRATION RATE: 16 BRPM | DIASTOLIC BLOOD PRESSURE: 89 MMHG | SYSTOLIC BLOOD PRESSURE: 143 MMHG

## 2024-04-12 DIAGNOSIS — C53.8 MALIGNANT NEOPLASM OF OVERLAPPING SITES OF CERVIX UTERI (HCC): Primary | ICD-10-CM

## 2024-04-12 DIAGNOSIS — D64.9 ANEMIA, UNSPECIFIED TYPE: ICD-10-CM

## 2024-04-12 PROCEDURE — 99211 OFF/OP EST MAY X REQ PHY/QHP: CPT | Performed by: INTERNAL MEDICINE

## 2024-04-12 PROCEDURE — 3077F SYST BP >= 140 MM HG: CPT | Performed by: INTERNAL MEDICINE

## 2024-04-12 PROCEDURE — 99213 OFFICE O/P EST LOW 20 MIN: CPT | Performed by: INTERNAL MEDICINE

## 2024-04-12 PROCEDURE — 3079F DIAST BP 80-89 MM HG: CPT | Performed by: INTERNAL MEDICINE

## 2024-04-12 RX ORDER — CYCLOBENZAPRINE HCL 5 MG
5 TABLET ORAL NIGHTLY PRN
COMMUNITY

## 2024-04-12 NOTE — PROGRESS NOTES
Leah Gonzalez                                                                                                                  4/12/2024  MRN:   [id number]  YOB: 1960  PCP:                           Patricia Quinn APRN - CNP  Referring Physician: No ref. provider found  Treating Physician Name: KONG PEACE MD      Reason for visit:  Chief Complaint   Patient presents with    Follow-up     Review of disease process     Current problems:  Squamous cell carcinoma of cervix, FIGO III C1  Anxiety  Hypercalcemia secondary to squamous cell carcinoma of cervix  Comorbidity including dyslipidemia hypertension and BMI 36    Active and recent treatments:  Concurrent chemoradiation using cisplatin-6/2023 through 7/2023  HDR-8/2023    Interval history:  Patient presents to the clinic for a follow-up visit and for toxicity check and to review results of her blood work-up.  Overall patient continues to do well.  Continues to follow-up with o gynecological oncology for surveillance and pelvic exams.  Lab workup shows adequate iron stores.  Hemoglobin 11.5.  During this visit patient's allergy, social, medical, surgical history and medications were reviewed and updated.    Summary of Case/History:    Leah Gonzalez a 63 y.o.female is a patient with recently diagnosed squamous cell carcinoma of cervix Presents to clinic to establish care and for further and evaluation.  Patient initially saw her primary care physician for vaginal bleeding.  Patient has had menopause.  Patient was subsequently referred to gynecological oncology.  Patient denies any abdominal pain or pelvic symptoms.  Patient underwent gynecological exam which showed 4.8 cm mass on the cervix.  There was also small amount of blood noted in the vaginal vault.  Bimanual examination was limited due to be obtained body habitus.  Biopsy taken came back as well-differentiated squamous cell carcinoma.  Patient underwent CT PET which showed

## 2024-04-12 NOTE — TELEPHONE ENCOUNTER
AVS from 04/12/2024    Rv in 1 year with labs      Doctor's schedule isnt built for next year.    Pt will have labs drawn 1 week prior to rv  AVS is in recall drawer    PT was given AVS     Electronically signed by Valentina Oropeza on 4/12/2024 at 2:13 PM

## 2024-04-17 ENCOUNTER — TELEPHONE (OUTPATIENT)
Dept: ONCOLOGY | Age: 64
End: 2024-04-17

## 2024-04-17 ENCOUNTER — HOSPITAL ENCOUNTER (OUTPATIENT)
Dept: PHYSICAL THERAPY | Facility: CLINIC | Age: 64
Setting detail: THERAPIES SERIES
Discharge: HOME | End: 2024-04-17
Payer: COMMERCIAL

## 2024-04-17 DIAGNOSIS — E78.5 HYPERLIPIDEMIA, UNSPECIFIED HYPERLIPIDEMIA TYPE: ICD-10-CM

## 2024-04-17 PROCEDURE — 97110 THERAPEUTIC EXERCISES: CPT

## 2024-04-17 RX ORDER — ROSUVASTATIN CALCIUM 5 MG/1
5 TABLET, COATED ORAL NIGHTLY
Qty: 90 TABLET | Refills: 3 | Status: SHIPPED | OUTPATIENT
Start: 2024-04-17

## 2024-04-17 NOTE — FLOWSHEET NOTE
[email or web address]  URL: [email or web address]  Date: 04/17/2024  Prepared by: Javon Jacob    Exercises  - Supine Transversus Abdominis Bracing - Hands on Stomach  - 1 x daily - 7 x weekly - 3 sets - 10 reps  - Supine Sciatic Nerve Glide  - 1 x daily - 7 x weekly - 5 reps - 5\" hold  - Lying Prone  - 1 x daily - 7 x weekly - 2 reps - 1' hold  - Static Prone on Elbows  - 1 x daily - 7 x weekly - 2 reps - 1' hold  - Seated Sciatic Tensioner  - 1 x daily - 7 x weekly - 5 reps - 5\" hold  - Supine March  - 1 x daily - 7 x weekly - 3 sets - 10 reps  - Hooklying Clamshell with Resistance  - 1 x daily - 7 x weekly - 3 sets - 10 reps  - Supine Figure 4 Piriformis Stretch  - 1 x daily - 7 x weekly - 3 sets - 10 reps  Comprehension of Education:  [x] Verbalizes understanding.  [x] Demonstrates understanding.  [] Needs review.  [x] Demonstrates/verbalizes HEP/Ed previously given.     Plan: [x] Continue current frequency toward long and short term goals.    [x] Specific Instructions for subsequent treatments: assess tolerance to previous treatment and progress as able      Time In: 1:02pm            Time Out: 2:00pm    Electronically signed by:  JAVON JACOB PTA

## 2024-04-17 NOTE — TELEPHONE ENCOUNTER
Name: Leah Gonzalez  : 1960  MRN: [id number]    Oncology Navigation Follow-Up Note    Contact Type:  Telephone    Notes: Spoke w/pt for f/u call.  Pt denied questions/concerns.  Pt confirmed  Dr. Roach f/u.  Discussed survivorship care plan & offered survivorship visit after Dr. Roach appt, pt agreeable.  Notified pt writer out of office - & will mail SCP week of .  Requested pt contact writer once SCP received.  Pt verbalized understanding & denied questions/concerns.  Encouraged to contact writer prn.  Will continue to follow.       Electronically signed by Ellie Beal RN on 2024 at 11:59 AM

## 2024-04-19 ENCOUNTER — HOSPITAL ENCOUNTER (OUTPATIENT)
Dept: PHYSICAL THERAPY | Facility: CLINIC | Age: 64
Setting detail: THERAPIES SERIES
Discharge: HOME | End: 2024-04-19
Payer: COMMERCIAL

## 2024-04-19 PROCEDURE — 97110 THERAPEUTIC EXERCISES: CPT

## 2024-04-19 NOTE — FLOWSHEET NOTE
posterior demonstrating appropriate postural stability for safety with gait.  []  []  []        Desensitization     Pt. Education:  [x] Yes  [] No  [x] Reviewed Prior HEP/Ed  Method of Education: [x] Verbal  [] Demo  [] Written    Access Code: [email or web address]  URL: [email or web address]  Date: 04/17/2024  Prepared by: Fortino Jacob  - Supine Transversus Abdominis Bracing - Hands on Stomach  - 1 x daily - 7 x weekly - 3 sets - 10 reps  - Supine Sciatic Nerve Glide  - 1 x daily - 7 x weekly - 5 reps - 5\" hold  - Lying Prone  - 1 x daily - 7 x weekly - 2 reps - 1' hold  - Static Prone on Elbows  - 1 x daily - 7 x weekly - 2 reps - 1' hold  - Seated Sciatic Tensioner  - 1 x daily - 7 x weekly - 5 reps - 5\" hold  - Supine March  - 1 x daily - 7 x weekly - 3 sets - 10 reps  - Hooklying Clamshell with Resistance  - 1 x daily - 7 x weekly - 3 sets - 10 reps  - Supine Figure 4 Piriformis Stretch  - 1 x daily - 7 x weekly - 3 sets - 10 reps    Comprehension of Education:  [] Verbalizes understanding.  [] Demonstrates understanding.  [] Needs review.  [x] Demonstrates/verbalizes HEP/Ed previously given.     Plan: [x] Continue current frequency toward long and short term goals.    [x] Specific Instructions for subsequent treatments: assess tolerance to previous treatment and progress as able      Time In: 1:03 pm            Time Out: 1:50 pm    Electronically signed by:  Roxanne Gu PTA

## 2024-04-23 ENCOUNTER — HOSPITAL ENCOUNTER (OUTPATIENT)
Dept: PHYSICAL THERAPY | Facility: CLINIC | Age: 64
Setting detail: THERAPIES SERIES
Discharge: HOME | End: 2024-04-23
Payer: COMMERCIAL

## 2024-04-23 PROCEDURE — 97110 THERAPEUTIC EXERCISES: CPT

## 2024-04-23 NOTE — FLOWSHEET NOTE
[] [facility]  Outpatient Rehabilitation &  Therapy  [address]  [phone]  [phone] [x] [facility]  Outpatient Rehabilitation &  Therapy  [address]  P: [phone]  F: [phone] [] [facility]  Outpatient Rehabilitation &  Therapy  [address]  P: [phone]  F: [phone] [] [facility]  Outpatient Rehabilitation &  Therapy  [address]  [phone]  [phone] [] [facility]  Outpatient Rehabilitation &  Therapy  [address]   P: [phone]  F: [phone]  [] [facility]  Outpatient Rehabilitation &  Therapy  [address]  P: [phone]  F: [phone] [] [facility]  Outpatient Rehabilitation &  Therapy  [address]  P: [phone]  F: [phone] [] [facility]  Outpatient Rehabilitation &  Therapy  [address]  P: [phone]  F: [phone] [] [facility]  Outpatient Rehabilitation &  Therapy  [address]  P: [phone]  F: [phone]  [] [facility] Outpatient Rehabilitation &  Therapy  [address]  P: [phone]  F: [phone]     Physical Therapy Daily Treatment Note    Date:  2024  Patient Name:  Leah Gonzalez    :  1960  MRN: [id number]  Physician: Karen SEAY                             Insurance: BCBS (60v/ko year hardmax)  Medical Diagnosis: M54.16 (ICD-10-CM) - Lumbar back pain with radiculopathy affecting left lower extremity              Rehab Codes: M54.16, M62.81, R26.89, M79.605  Onset Date: 24                                       Next 's appt.: 24     Visit# / total visits:       Cancels/No Shows: 0/1    Subjective:    Pain:  [] Yes  [x] No Location: L leg/low back Pain Rating: (0-10 scale) 0/10  Pain altered Tx:  [x] No

## 2024-04-25 DIAGNOSIS — M54.16 LUMBAR BACK PAIN WITH RADICULOPATHY AFFECTING LEFT LOWER EXTREMITY: ICD-10-CM

## 2024-04-25 RX ORDER — GABAPENTIN 300 MG/1
300 CAPSULE ORAL 3 TIMES DAILY
Qty: 90 CAPSULE | Refills: 0 | Status: SHIPPED | OUTPATIENT
Start: 2024-04-25 | End: 2024-05-25

## 2024-05-01 ENCOUNTER — TELEMEDICINE (OUTPATIENT)
Dept: FAMILY MEDICINE CLINIC | Age: 64
End: 2024-05-01
Payer: COMMERCIAL

## 2024-05-01 ENCOUNTER — HOSPITAL ENCOUNTER (OUTPATIENT)
Dept: PHYSICAL THERAPY | Facility: CLINIC | Age: 64
Setting detail: THERAPIES SERIES
Discharge: HOME | End: 2024-05-01
Payer: COMMERCIAL

## 2024-05-01 DIAGNOSIS — F41.0 PANIC DISORDER: Primary | ICD-10-CM

## 2024-05-01 DIAGNOSIS — R73.01 IFG (IMPAIRED FASTING GLUCOSE): ICD-10-CM

## 2024-05-01 DIAGNOSIS — E78.5 HYPERLIPIDEMIA, UNSPECIFIED HYPERLIPIDEMIA TYPE: ICD-10-CM

## 2024-05-01 PROCEDURE — 99213 OFFICE O/P EST LOW 20 MIN: CPT | Performed by: NURSE PRACTITIONER

## 2024-05-01 PROCEDURE — 97110 THERAPEUTIC EXERCISES: CPT

## 2024-05-01 RX ORDER — ROSUVASTATIN CALCIUM 5 MG/1
5 TABLET, COATED ORAL NIGHTLY
Qty: 90 TABLET | Refills: 3 | Status: SHIPPED | OUTPATIENT
Start: 2024-05-01

## 2024-05-01 ASSESSMENT — ENCOUNTER SYMPTOMS
SORE THROAT: 0
CONSTIPATION: 0
DIARRHEA: 0
BACK PAIN: 0
CHEST TIGHTNESS: 0
COLOR CHANGE: 0
COUGH: 0
ABDOMINAL PAIN: 0
NAUSEA: 0
SHORTNESS OF BREATH: 0
RHINORRHEA: 0
ABDOMINAL DISTENTION: 0

## 2024-05-01 NOTE — PROGRESS NOTES
Patricia Quinn, APRN-CNP  [facility]  [facility] MEDICINE  [address], [address]  [facility] [address]  Dept: [phone]  Dept Fax: [phone]     Patient ID: Leah Gonzalez is a 63 y.o. female Established patient    HPI    Leah Gonzalez, was evaluated through a synchronous (real-time) audio-video encounter. The patient (or guardian if applicable) is aware that this is a billable service, which includes applicable co-pays. This Virtual Visit was conducted with patient's (and/or legal guardian's) consent. Patient identification was verified, and a caregiver was present when appropriate.   The patient was located at Home: [address] [place] [id number]  Provider was located at Facility (Appt Dept): [address], [address]  [place] [address]  Confirm you are appropriately licensed, registered, or certified to deliver care in the state where the patient is located as indicated above. If you are not or unsure, please re-schedule the visit: Yes, I confirm.     Leah Gonzalez (1960) presenting virtually for evaluation of the following:  She is having trouble with her foot and is going through PT and taking gabapentin for it and think it is related to her back.  - she isn't having as many panic attacks anymore, she does use it for sleeping maybe once a week.     Otherwise pt doing well on current tx and no other concerns today.     The patient's past medical, surgical, social, and family history as well as his current medications and allergies were reviewed as documented in today's encounter by RAJINDER Gilliam.      Previous office notes, labs, imaging and hospital records were reviewed prior to and during encounter.    Current Outpatient Medications on File Prior to Visit   Medication Sig Dispense Refill    gabapentin (NEURONTIN) 300 MG capsule Take 1 capsule by mouth 3 times daily for 30 days. 90 capsule 0    rosuvastatin (CRESTOR) 5 MG tablet

## 2024-05-01 NOTE — FLOWSHEET NOTE
[] [facility]  Outpatient Rehabilitation &  Therapy  [address]  [phone]  [phone] [x] [facility]  Outpatient Rehabilitation &  Therapy  [address]  P: [phone]  F: [phone] [] [facility]  Outpatient Rehabilitation &  Therapy  [address]  P: [phone]  F: [phone] [] [facility]  Outpatient Rehabilitation &  Therapy  [address]  [phone]  [phone] [] [facility]  Outpatient Rehabilitation &  Therapy  [address]   P: [phone]  F: [phone]  [] [facility]  Outpatient Rehabilitation &  Therapy  [address]  P: [phone]  F: [phone] [] [facility]  Outpatient Rehabilitation &  Therapy  [address]  P: [phone]  F: [phone] [] [facility]  Outpatient Rehabilitation &  Therapy  [address]  P: [phone]  F: [phone] [] [facility]  Outpatient Rehabilitation &  Therapy  [address]  P: [phone]  F: [phone]  [] [facility] Outpatient Rehabilitation &  Therapy  [address]  P: [phone]  F: [phone]     Physical Therapy Daily Treatment Note    Date:  2024  Patient Name:  Leah Gonzalez    :  1960  MRN: [id number]  Physician: Karen SEAY                             Insurance: BCBS (60v/ko year hardmax)  Medical Diagnosis: M54.16 (ICD-10-CM) - Lumbar back pain with radiculopathy affecting left lower extremity              Rehab Codes: M54.16, M62.81, R26.89, M79.605  Onset Date: 24                                       Next 's appt.: 24     Visit# / total visits:       Cancels/No Shows: 0/1    Subjective:    Pain:  [] Yes  [x] No Location: L leg/low back Pain Rating: (0-10 scale) 0/10  Pain altered Tx:  [x] No

## 2024-05-02 ENCOUNTER — OFFICE VISIT (OUTPATIENT)
Dept: GYNECOLOGIC ONCOLOGY | Age: 64
End: 2024-05-02
Payer: COMMERCIAL

## 2024-05-02 VITALS
SYSTOLIC BLOOD PRESSURE: 146 MMHG | HEIGHT: 62 IN | BODY MASS INDEX: 36.7 KG/M2 | HEART RATE: 100 BPM | OXYGEN SATURATION: 95 % | WEIGHT: 199.4 LBS | DIASTOLIC BLOOD PRESSURE: 87 MMHG | TEMPERATURE: 98.1 F

## 2024-05-02 DIAGNOSIS — R20.0 LEG NUMBNESS: ICD-10-CM

## 2024-05-02 DIAGNOSIS — N93.9 VAGINAL BLEEDING: ICD-10-CM

## 2024-05-02 DIAGNOSIS — M54.50 CHRONIC MIDLINE LOW BACK PAIN WITHOUT SCIATICA: Chronic | ICD-10-CM

## 2024-05-02 DIAGNOSIS — G89.29 CHRONIC MIDLINE LOW BACK PAIN WITHOUT SCIATICA: Chronic | ICD-10-CM

## 2024-05-02 DIAGNOSIS — M79.89 LEFT LEG SWELLING: ICD-10-CM

## 2024-05-02 DIAGNOSIS — Z92.3 HISTORY OF RADIATION THERAPY: Chronic | ICD-10-CM

## 2024-05-02 DIAGNOSIS — E83.42 HYPOMAGNESEMIA: Chronic | ICD-10-CM

## 2024-05-02 DIAGNOSIS — Z92.21 HISTORY OF CHEMOTHERAPY: Chronic | ICD-10-CM

## 2024-05-02 DIAGNOSIS — C53.8 MALIGNANT NEOPLASM OF OVERLAPPING SITES OF CERVIX UTERI (HCC): Primary | Chronic | ICD-10-CM

## 2024-05-02 DIAGNOSIS — D64.9 ANEMIA, UNSPECIFIED TYPE: Chronic | ICD-10-CM

## 2024-05-02 DIAGNOSIS — M84.48XG SACRAL INSUFFICIENCY FRACTURE WITH DELAYED HEALING, SUBSEQUENT ENCOUNTER: Chronic | ICD-10-CM

## 2024-05-02 PROCEDURE — 99215 OFFICE O/P EST HI 40 MIN: CPT | Performed by: OBSTETRICS & GYNECOLOGY

## 2024-05-02 PROCEDURE — 3077F SYST BP >= 140 MM HG: CPT | Performed by: OBSTETRICS & GYNECOLOGY

## 2024-05-02 PROCEDURE — 3079F DIAST BP 80-89 MM HG: CPT | Performed by: OBSTETRICS & GYNECOLOGY

## 2024-05-03 ENCOUNTER — HOSPITAL ENCOUNTER (OUTPATIENT)
Dept: PHYSICAL THERAPY | Facility: CLINIC | Age: 64
Setting detail: THERAPIES SERIES
Discharge: HOME | End: 2024-05-03
Payer: COMMERCIAL

## 2024-05-03 PROCEDURE — 97110 THERAPEUTIC EXERCISES: CPT

## 2024-05-03 NOTE — FLOWSHEET NOTE
7 x weekly - 1 sets - 10 reps  Date: 05/03/2024  Prepared by: Roxanne Gu  - Heel Raises with Counter Support  - 1 x daily - 7 x weekly - 2 sets - 10 reps  - Mini Squat  - 1 x daily - 7 x weekly - 2 sets - 10 reps  - Standing Hip Abduction with Counter Support  - 1 x daily - 7 x weekly - 2 sets - 10 reps  - Standing Hip Flexion with Counter Support  - 1 x daily - 7 x weekly - 2 sets - 10 reps  - Standing March with Counter Support  - 1 x daily - 7 x weekly - 2 sets - 10 reps      Comprehension of Education:  [x] Verbalizes understanding.  [x] Demonstrates understanding.  [] Needs review.  [x] Demonstrates/verbalizes HEP/Ed previously given.     Plan: [x] Continue current frequency toward long and short term goals.    [x] Specific Instructions for subsequent treatments: assess tolerance to previous treatment and progress as able      Time In: 1:02 pm         Time Out: 1:57  pm    Electronically signed by:  Roxanne Gu PTA

## 2024-05-08 ENCOUNTER — HOSPITAL ENCOUNTER (OUTPATIENT)
Dept: PHYSICAL THERAPY | Facility: CLINIC | Age: 64
Setting detail: THERAPIES SERIES
Discharge: HOME | End: 2024-05-08
Payer: COMMERCIAL

## 2024-05-08 PROCEDURE — 97110 THERAPEUTIC EXERCISES: CPT

## 2024-05-08 NOTE — FLOWSHEET NOTE
[place]  - Heel Raises with Counter Support  - 1 x daily - 7 x weekly - 2 sets - 10 reps  - Mini Squat  - 1 x daily - 7 x weekly - 2 sets - 10 reps  - Standing Hip Abduction with Counter Support  - 1 x daily - 7 x weekly - 2 sets - 10 reps  - Standing Hip Flexion with Counter Support  - 1 x daily - 7 x weekly - 2 sets - 10 reps  - Standing March with Counter Support  - 1 x daily - 7 x weekly - 2 sets - 10 reps  Date: 05/08/2024  - cross body piriformis stretch - 1 x daily - 7 x weekly - 2 sets ea - 30 seconds hold    Comprehension of Education:  [x] Verbalizes understanding.  [x] Demonstrates understanding.  [] Needs review.  [x] Demonstrates/verbalizes HEP/Ed previously given.     Plan: [x] Continue current frequency toward long and short term goals.    [x] Specific Instructions for subsequent treatments: progress gait and, standing functional strengthening, and stability interventions      Time In: 1:03 pm         Time Out:  1:51 pm    Electronically signed by:  LELAND MARTINEZ    This documentation has been reviewed and entirety of treatment session was under direct supervision by clinical instructor, Vane Khanna PT DPT

## 2024-05-09 ENCOUNTER — OFFICE VISIT (OUTPATIENT)
Dept: ORTHOPEDIC SURGERY | Age: 64
End: 2024-05-09
Payer: COMMERCIAL

## 2024-05-09 VITALS — HEIGHT: 62 IN | WEIGHT: 199 LBS | RESPIRATION RATE: 12 BRPM | BODY MASS INDEX: 36.62 KG/M2

## 2024-05-09 DIAGNOSIS — M54.16 LEFT LUMBAR RADICULOPATHY: ICD-10-CM

## 2024-05-09 DIAGNOSIS — M21.372 FOOT DROP, LEFT: Primary | ICD-10-CM

## 2024-05-09 PROCEDURE — 99213 OFFICE O/P EST LOW 20 MIN: CPT | Performed by: PHYSICIAN ASSISTANT

## 2024-05-09 RX ORDER — CYCLOBENZAPRINE HCL 5 MG
5 TABLET ORAL NIGHTLY PRN
Qty: 10 TABLET | Refills: 0 | Status: SHIPPED | OUTPATIENT
Start: 2024-05-09 | End: 2024-05-19

## 2024-05-09 NOTE — PROGRESS NOTES
fasting glucose) 11/04/2020    Panic disorder      Past Surgical History:   Procedure Laterality Date    CERVIX BIOPSY N/A 07/20/2023    EXAMINATION UNDER ANESTHESIA, CERVICAL DILATION, PLACEMENT OF PAULIE SLEEVE, ULTRASOUND GUIDANCE performed by Mary Roach MD at [facility] OR    CHOLECYSTECTOMY      DENTAL SURGERY      teeth pulled due to gum disease    IR PORT PLACEMENT EQUAL OR GREATER THAN 5 YEARS  05/31/2023    IR PORT PLACEMENT EQUAL OR GREATER THAN 5 YEARS 5/31/2023 Keo Koenig MD [facility] SPECIAL PROCEDURES / Removed 9/2023    SPINE SURGERY N/A 1/11/2024    KYPHOPLASTY LUMBAR SACRAL VERTEBROPLASTY L5-S1 performed by Juno rFench MD at [facility] OR    TUBAL LIGATION      TUNNELED VENOUS PORT PLACEMENT  05/31/2023    chemo port    VAGINA EXAMINATION UNDER ANESTHESIA  07/20/2023    EXAMINATION UNDER ANESTHESIA, CERVICAL DILATION, PLACEMENT OF PAULIE SLEEVE, ULTRASOUND GUIDANCE (Cervix     Family History   Problem Relation Age of Onset    High Blood Pressure Father     COPD Father     High Cholesterol Father     Cataracts Father     High Blood Pressure Sister     High Cholesterol Sister     Thyroid Disease Sister     Anxiety Disorder Sister     Emphysema Brother     Heart Failure Brother         Physical Exam:  Vitals signs and nursing note reviewed.   Constitutional:       Appearance: well-developed.   HENT:      Head: Normocephalic and atraumatic.      Nose: Nose normal.   Eyes:      Conjunctiva/sclera: Conjunctivae normal.   Neck:      Musculoskeletal: Normal range of motion and neck supple.   Pulmonary:      Effort: Pulmonary effort is normal. No respiratory distress.   Neurological:      Mental Status: Alert and oriented to person, place, and time.      Sensory: No sensory deficit.   Psychiatric:         Behavior: Behavior normal.         Thought Content: Thought content normal.  Skin:     General: Skin is warm and dry.   Musculoskeletal:      Comments: Normal gait  Strength is intact throughout except to

## 2024-05-10 ENCOUNTER — HOSPITAL ENCOUNTER (OUTPATIENT)
Dept: PHYSICAL THERAPY | Facility: CLINIC | Age: 64
Setting detail: THERAPIES SERIES
Discharge: HOME | End: 2024-05-10
Payer: COMMERCIAL

## 2024-05-10 ENCOUNTER — TELEPHONE (OUTPATIENT)
Dept: ONCOLOGY | Age: 64
End: 2024-05-10

## 2024-05-10 PROCEDURE — 97110 THERAPEUTIC EXERCISES: CPT

## 2024-05-10 NOTE — TELEPHONE ENCOUNTER
Name: Leah Gonzalez  : 1960  MRN: [id number]    Oncology Navigation Follow-Up Note    Contact Type:  Telephone    Notes: Spoke w/pt for f/u call.  Pt stated completed  Dr. Roach f/u & no evidence of disease.  Offered to send SCP in mail & pt to contact writer once receives to complete survivorship visit.  Pt agreeable & denied questions/concerns.  SCP mailed.  Will continue to follow.      Electronically signed by Ellie Beal RN on 5/10/2024 at 11:26 AM

## 2024-05-10 NOTE — FLOWSHEET NOTE
less than 20% impairment to demonstrate improved functional mobility []  []  []      2. Reduce LLE radicular pain levels to 4/10 or less to improve tolerance to prolonged standing and ambulation. []  []  []      3. Pt to demonstrate ability to ambulate at least 300' with least restrictive AD and improved gait mechanics for ease of community access.  []  []  []      4. Pt to demonstrate ability to ascend and descend a full flight of stairs with reciprocal stepping mechanics to ease access to basement for laundry.  []  []  []      5. Pt to maintain tandem stance for at least 20\" with LLE posterior demonstrating appropriate postural stability for safety with gait.  []  []  []        Desensitization     Pt. Education:  [x] Yes  [] No  [x] Reviewed Prior HEP/Ed  Method of Education: [x] Verbal  [] Demo  [] Written    Access Code: [email or web address]  URL: [email or web address]  Date: 04/17/2024  Prepared by: Fortino Jacob  - Supine Transversus Abdominis Bracing - Hands on Stomach  - 1 x daily - 7 x weekly - 3 sets - 10 reps  - Supine Sciatic Nerve Glide  - 1 x daily - 7 x weekly - 5 reps - 5\" hold  - Lying Prone  - 1 x daily - 7 x weekly - 2 reps - 1' hold  - Static Prone on Elbows  - 1 x daily - 7 x weekly - 2 reps - 1' hold  - Seated Sciatic Tensioner  - 1 x daily - 7 x weekly - 5 reps - 5\" hold  - Supine March  - 1 x daily - 7 x weekly - 3 sets - 10 reps  - Hooklying Clamshell with Resistance  - 1 x daily - 7 x weekly - 3 sets - 10 reps  - Supine Figure 4 Piriformis Stretch  - 1 x daily - 7 x weekly - 3 sets - 10 reps  4/23/24  - Supine Posterior Pelvic Tilt  - 1 x daily - 7 x weekly - 1 sets - 10 reps - 3 seconds  hold  - Supine Lower Trunk Rotation  - 1 x daily - 7 x weekly - 2 sets - 10 reps - 5 seconds hold  - Supine Bridge  - 1 x daily - 7 x weekly - 2 sets - 10 reps  - Prone Hip Extension  - 1 x daily - 7 x weekly - 1 sets - 15 reps  - Clamshell with Resistance  - 1 x daily - 7 x weekly - 2 sets - 10 reps  Date:

## 2024-05-15 ENCOUNTER — HOSPITAL ENCOUNTER (OUTPATIENT)
Dept: PHYSICAL THERAPY | Facility: CLINIC | Age: 64
Setting detail: THERAPIES SERIES
Discharge: HOME | End: 2024-05-15
Payer: COMMERCIAL

## 2024-05-15 PROCEDURE — 97110 THERAPEUTIC EXERCISES: CPT

## 2024-05-15 NOTE — FLOWSHEET NOTE
Adduction Isometric with Ball  - 1 x daily - 7 x weekly - 10 reps - 5\" hold  - Sidelying Hip Abduction  - 1 x daily - 7 x weekly - 1 sets - 10 reps  Date: 05/03/2024  Prepared by: Roxanne Gu  - Heel Raises with Counter Support  - 1 x daily - 7 x weekly - 2 sets - 10 reps  - Mini Squat  - 1 x daily - 7 x weekly - 2 sets - 10 reps  - Standing Hip Abduction with Counter Support  - 1 x daily - 7 x weekly - 2 sets - 10 reps  - Standing Hip Flexion with Counter Support  - 1 x daily - 7 x weekly - 2 sets - 10 reps  - Standing March with Counter Support  - 1 x daily - 7 x weekly - 2 sets - 10 reps  Date: 05/08/2024  - cross body piriformis stretch - 1 x daily - 7 x weekly - 2 sets ea - 30 seconds hold    Comprehension of Education:  [x] Verbalizes understanding.  [x] Demonstrates understanding.  [] Needs review.  [x] Demonstrates/verbalizes HEP/Ed previously given.     Plan: [x] Continue current frequency toward long and short term goals.    [x] Specific Instructions for subsequent treatments: progress gait and, standing functional strengthening, and stability interventions      Time In: 1:01 pm         Time Out: 1:53 pm    Electronically signed by:  EVELIN MADRID PTA

## 2024-05-17 ENCOUNTER — HOSPITAL ENCOUNTER (OUTPATIENT)
Dept: PHYSICAL THERAPY | Facility: CLINIC | Age: 64
Setting detail: THERAPIES SERIES
Discharge: HOME | End: 2024-05-17
Payer: COMMERCIAL

## 2024-05-17 PROCEDURE — 97012 MECHANICAL TRACTION THERAPY: CPT

## 2024-05-17 PROCEDURE — 97110 THERAPEUTIC EXERCISES: CPT

## 2024-05-17 NOTE — FLOWSHEET NOTE
Isometric with Ball  - 1 x daily - 7 x weekly - 10 reps - 5\" hold  - Sidelying Hip Abduction  - 1 x daily - 7 x weekly - 1 sets - 10 reps  Date: 05/03/2024  Prepared by: Roxanne Gu  - Heel Raises with Counter Support  - 1 x daily - 7 x weekly - 2 sets - 10 reps  - Mini Squat  - 1 x daily - 7 x weekly - 2 sets - 10 reps  - Standing Hip Abduction with Counter Support  - 1 x daily - 7 x weekly - 2 sets - 10 reps  - Standing Hip Flexion with Counter Support  - 1 x daily - 7 x weekly - 2 sets - 10 reps  - Standing March with Counter Support  - 1 x daily - 7 x weekly - 2 sets - 10 reps  Date: 05/08/2024  - cross body piriformis stretch - 1 x daily - 7 x weekly - 2 sets ea - 30 seconds hold    Comprehension of Education:  [] Verbalizes understanding.  [] Demonstrates understanding.  [] Needs review.  [x] Demonstrates/verbalizes HEP/Ed previously given.     Plan: [x] Continue current frequency toward long and short term goals.    [x] Specific Instructions for subsequent treatments: progress gait and, standing functional strengthening, and stability interventions      Time In: 1:02 pm         Time Out: 2:00 pm    Electronically signed by:  Roxanne Gu PTA

## 2024-05-22 ENCOUNTER — HOSPITAL ENCOUNTER (OUTPATIENT)
Dept: PHYSICAL THERAPY | Facility: CLINIC | Age: 64
Setting detail: THERAPIES SERIES
Discharge: HOME | End: 2024-05-22
Payer: COMMERCIAL

## 2024-05-22 PROCEDURE — 97110 THERAPEUTIC EXERCISES: CPT

## 2024-05-22 PROCEDURE — 97012 MECHANICAL TRACTION THERAPY: CPT

## 2024-05-22 NOTE — FLOWSHEET NOTE
[] [facility]  Outpatient Rehabilitation &  Therapy  [address]  [phone]  [phone] [x] [facility]  Outpatient Rehabilitation &  Therapy  [address]  P: [phone]  F: [phone] [] [facility]  Outpatient Rehabilitation &  Therapy  [address]  P: [phone]  F: [phone] [] [facility]  Outpatient Rehabilitation &  Therapy  [address]  [phone]  [phone] [] [facility]  Outpatient Rehabilitation &  Therapy  [address]   P: [phone]  F: [phone]  [] [facility]  Outpatient Rehabilitation &  Therapy  [address]  P: [phone]  F: [phone] [] [facility]  Outpatient Rehabilitation &  Therapy  [address]  P: [phone]  F: [phone] [] [facility]  Outpatient Rehabilitation &  Therapy  [address]  P: [phone]  F: [phone] [] [facility]  Outpatient Rehabilitation &  Therapy  [address]  P: [phone]  F: [phone]  [] [facility] Outpatient Rehabilitation &  Therapy  [address]  P: [phone]  F: [phone]     Physical Therapy Daily Treatment Note    Date:  2024  Patient Name:  Leah Gonzalez    :  1960  MRN: [id number]  Physician: Karen SEAY                             Insurance: BCBS (60v/ok year hardmax)  Medical Diagnosis: M54.16 (ICD-10-CM) - Lumbar back pain with radiculopathy affecting left lower extremity              Rehab Codes: M54.16, M62.81, R26.89, M79.605  Onset Date: 24                                       Next 's appt.: 24     Visit# / total visits:       Cancels/No Shows: 0/1  Frequency:  2 x/week for 12 visits    Subjective:    Pain:  [] Yes  [x] No Location: N/A currently Pain Rating: (0-10

## 2024-05-23 DIAGNOSIS — M54.16 LUMBAR BACK PAIN WITH RADICULOPATHY AFFECTING LEFT LOWER EXTREMITY: ICD-10-CM

## 2024-05-23 RX ORDER — GABAPENTIN 300 MG/1
300 CAPSULE ORAL 3 TIMES DAILY
Qty: 90 CAPSULE | Refills: 1 | Status: SHIPPED | OUTPATIENT
Start: 2024-05-23 | End: 2024-07-22

## 2024-05-24 ENCOUNTER — TELEPHONE (OUTPATIENT)
Dept: ONCOLOGY | Age: 64
End: 2024-05-24

## 2024-05-24 ENCOUNTER — HOSPITAL ENCOUNTER (OUTPATIENT)
Dept: PHYSICAL THERAPY | Facility: CLINIC | Age: 64
Setting detail: THERAPIES SERIES
Discharge: HOME | End: 2024-05-24
Payer: COMMERCIAL

## 2024-05-24 PROCEDURE — 97110 THERAPEUTIC EXERCISES: CPT

## 2024-05-24 PROCEDURE — 97012 MECHANICAL TRACTION THERAPY: CPT

## 2024-05-24 NOTE — TELEPHONE ENCOUNTER
Name: Leah Gonzalez  : 1960  MRN: [id number]    Oncology Navigation Follow-Up Note    Contact Type:  Telephone    Notes: Pt called in stating received survivorship care plan.  Reviewed SCP w/pt.  Pt c/o fatigue & confirmed using vaginal dilator.  Pt denied questions/concerns.  Notified navigation ended & encouraged to contact writer prn.  [facility] survivorship folder & SCP mailed to pt.    Cancer Treatment Summary and  Survivorship Care Plan    Provided by Ellie Beal RN on 24        This Cancer Treatment Summary is a brief record of your cancer treatment. The forms provide a way for a cancer survivor to review and retain information about their cancer, cancer treatment, and follow-up care. The forms are also meant to provide basic information about a survivor's care to future healthcare providers.      General Information   Patient name Leah Gonzalez    Patient ID [id number]    Phone [phone] (home)    Date of birth/Age 1960, 63 y.o.          Health Care Team and Follow-Ups    Continue all standard non-cancer related health care with your Primary Care Provider. Any new, unusual and/or persistent symptoms should be brought to the attention of your provider.    Primary Care Physician Patricia Quinn APRN - CNP, [phone]    Coordinating Provider When / How Often?   Medical Oncology visits Dr. RICARDO Joyner   [address] [place], [place] [address]  Phone: [phone]  Fax: [phone]    Every 3-6 months for 2 yrs then every 6-12 months for 3-5 years, then annually based on the patient's risk of disease recurrence per NCCN Guidelines      Next appt:  DUE 2025   Radiation Oncology visits Dr. KATHY Sepulveda  [address] [place] [address]  Phone: [phone]    Every 3-6 months for 2 yrs then every 6-12 months for 3-5 years, then annually based on the patient's risk of disease recurrence per NCCN Guidelines      Next appt:  2024   Gynecologic oncology visits

## 2024-05-24 NOTE — DISCHARGE SUMMARY
[] [facility]  Outpatient Rehabilitation &  Therapy  [address]  [phone]  [phone] [x] [facility]  Outpatient Rehabilitation &  Therapy  [address]  P: [phone]  F: [phone] [] [facility]  Outpatient Rehabilitation &  Therapy  [address]  P: [phone]  F: [phone] [] [facility]  Outpatient Rehabilitation &  Therapy  [address]  [phone]  [phone] [] [facility]  Outpatient Rehabilitation &  Therapy  [address]   P: [phone]  F: [phone]  [] [facility]  Outpatient Rehabilitation &  Therapy  [address]  P: [phone]  F: [phone] [] [facility]  Outpatient Rehabilitation &  Therapy  [address]  P: [phone]  F: [phone] [] [facility]  Outpatient Rehabilitation &  Therapy  [address]  P: [phone]  F: [phone] [] [facility]  Outpatient Rehabilitation &  Therapy  [address]  P: [phone]  F: [phone]  [] [facility] Outpatient Rehabilitation &  Therapy  [address]  P: [phone]  F: [phone]     Physical Therapy Discharge Note    Date: 2024      Patient: Leah Gonzalez  : 1960  MRN: [id number]    Physician: Karen SEAY                             Insurance: BCBS (60v/ko year hardmax)  Medical Diagnosis: M54.16 (ICD-10-CM) - Lumbar back pain with radiculopathy affecting left lower extremity              Rehab Codes: M54.16, M62.81, R26.89, M79.605  Onset Date: 24                                       Next 's appt.: 24     Visit# / total visits:                                 Cancels/No Shows: 0  Date of initial visit: 24                Date of final visit: 24    Subjective:    Pain:  []

## 2024-05-24 NOTE — FLOWSHEET NOTE
reps  - Clamshell with Resistance  - 1 x daily - 7 x weekly - 2 sets - 10 reps  Date: 05/01/2024  Prepared by: Vane Husain  - Supine Straight leg raise  - 1 x daily - 7 x weekly - 2 sets - 10 reps  - Supine Hip Adduction Isometric with Ball  - 1 x daily - 7 x weekly - 10 reps - 5\" hold  - Sidelying Hip Abduction  - 1 x daily - 7 x weekly - 1 sets - 10 reps  Date: 05/03/2024  Prepared by: Roxanne Gu  - Heel Raises with Counter Support  - 1 x daily - 7 x weekly - 2 sets - 10 reps  - Mini Squat  - 1 x daily - 7 x weekly - 2 sets - 10 reps  - Standing Hip Abduction with Counter Support  - 1 x daily - 7 x weekly - 2 sets - 10 reps  - Standing Hip Flexion with Counter Support  - 1 x daily - 7 x weekly - 2 sets - 10 reps  - Standing March with Counter Support  - 1 x daily - 7 x weekly - 2 sets - 10 reps  Date: 05/08/2024  - cross body piriformis stretch - 1 x daily - 7 x weekly - 2 sets ea - 30 seconds hold  Date: 5/24/2024   - Mini Lunge  - 1 x daily - 7 x weekly - 1-2 sets - 10 reps    Comprehension of Education:  [x] Verbalizes understanding.  [x] Demonstrates understanding.  [] Needs review.  [x] Demonstrates/verbalizes HEP/Ed previously given.     Plan: [] Continue current frequency toward long and short term goals.    [x] Specific Instructions for subsequent treatments: D/C this date.       Time In: 12:56 pm         Time Out:  2:00 pm    Electronically signed by:  Roxanne Gu PTA

## 2024-05-27 DIAGNOSIS — F41.0 PANIC DISORDER: ICD-10-CM

## 2024-05-28 RX ORDER — ALPRAZOLAM 0.5 MG/1
TABLET ORAL
Qty: 30 TABLET | Refills: 0 | Status: SHIPPED | OUTPATIENT
Start: 2024-05-28 | End: 2024-06-27

## 2024-07-17 DIAGNOSIS — M54.16 LUMBAR BACK PAIN WITH RADICULOPATHY AFFECTING LEFT LOWER EXTREMITY: ICD-10-CM

## 2024-07-17 DIAGNOSIS — M80.00XA AGE-RELATED OSTEOPOROSIS WITH CURRENT PATHOLOGICAL FRACTURE, INITIAL ENCOUNTER: ICD-10-CM

## 2024-07-17 RX ORDER — ALENDRONATE SODIUM 70 MG/1
70 TABLET ORAL
Qty: 12 TABLET | Refills: 1 | Status: SHIPPED | OUTPATIENT
Start: 2024-07-17

## 2024-07-17 NOTE — TELEPHONE ENCOUNTER
Patient called requesting a refill on her Gabapentin to be sent to \[facility]\"" [facility] instead of [facility].    She is asking for a return call to advise.    Thank you.

## 2024-07-21 RX ORDER — GABAPENTIN 300 MG/1
300 CAPSULE ORAL 3 TIMES DAILY
Qty: 90 CAPSULE | Refills: 1 | Status: SHIPPED | OUTPATIENT
Start: 2024-07-21 | End: 2024-09-19

## 2024-07-22 ENCOUNTER — TELEPHONE (OUTPATIENT)
Dept: RADIATION ONCOLOGY | Age: 64
End: 2024-07-22

## 2024-07-22 NOTE — TELEPHONE ENCOUNTER
Patient called to cancel follow up with Dr. Sepulveda on 7/23/24.   Offered to reschedule patient declined states she will c/b.   Noted patient has a follow up on 8/22/24 with Dr. Roach gyn onc.

## 2024-07-26 ENCOUNTER — HOSPITAL ENCOUNTER (OUTPATIENT)
Age: 64
Discharge: HOME | End: 2024-07-26
Payer: COMMERCIAL

## 2024-07-26 DIAGNOSIS — E78.5 HYPERLIPIDEMIA, UNSPECIFIED HYPERLIPIDEMIA TYPE: ICD-10-CM

## 2024-07-26 DIAGNOSIS — R73.01 IFG (IMPAIRED FASTING GLUCOSE): ICD-10-CM

## 2024-07-26 LAB
CHOLEST SERPL-MCNC: 149 MG/DL (ref 0–199)
CHOLESTEROL/HDL RATIO: 3
EST. AVERAGE GLUCOSE BLD GHB EST-MCNC: 126 MG/DL
HBA1C MFR BLD: 6 % (ref 4–6)
HDLC SERPL-MCNC: 48 MG/DL
LDLC SERPL CALC-MCNC: 73 MG/DL (ref 0–100)
TRIGL SERPL-MCNC: 140 MG/DL
VLDLC SERPL CALC-MCNC: 28 MG/DL

## 2024-07-26 PROCEDURE — 83036 HEMOGLOBIN GLYCOSYLATED A1C: CPT

## 2024-07-26 PROCEDURE — 80061 LIPID PANEL: CPT

## 2024-07-26 PROCEDURE — 36415 COLL VENOUS BLD VENIPUNCTURE: CPT

## 2024-08-01 ENCOUNTER — TELEMEDICINE (OUTPATIENT)
Dept: FAMILY MEDICINE CLINIC | Age: 64
End: 2024-08-01
Payer: COMMERCIAL

## 2024-08-01 DIAGNOSIS — M25.552 LEFT HIP PAIN: ICD-10-CM

## 2024-08-01 DIAGNOSIS — R73.01 IFG (IMPAIRED FASTING GLUCOSE): ICD-10-CM

## 2024-08-01 DIAGNOSIS — M84.48XA SACRAL INSUFFICIENCY FRACTURE, INITIAL ENCOUNTER: ICD-10-CM

## 2024-08-01 DIAGNOSIS — F41.0 PANIC DISORDER: Primary | ICD-10-CM

## 2024-08-01 DIAGNOSIS — E78.2 MIXED HYPERLIPIDEMIA: ICD-10-CM

## 2024-08-01 DIAGNOSIS — C53.8 MALIGNANT NEOPLASM OF OVERLAPPING SITES OF CERVIX UTERI (HCC): ICD-10-CM

## 2024-08-01 PROCEDURE — 99213 OFFICE O/P EST LOW 20 MIN: CPT | Performed by: NURSE PRACTITIONER

## 2024-08-01 RX ORDER — ALPRAZOLAM 0.5 MG/1
0.5 TABLET ORAL NIGHTLY PRN
Qty: 30 TABLET | Refills: 0 | Status: SHIPPED | OUTPATIENT
Start: 2024-08-01 | End: 2024-08-31

## 2024-08-01 ASSESSMENT — ENCOUNTER SYMPTOMS
COLOR CHANGE: 0
COUGH: 0
ABDOMINAL DISTENTION: 0
NAUSEA: 0
SORE THROAT: 0
SHORTNESS OF BREATH: 0
RHINORRHEA: 0
CHEST TIGHTNESS: 0
DIARRHEA: 0
CONSTIPATION: 0
BACK PAIN: 0
ABDOMINAL PAIN: 0

## 2024-08-01 NOTE — PROGRESS NOTES
Patricia Quinn, FRANK-CNP  [facility]  [facility]  [address], [address]  [facility] [address]  Dept: [phone]  Dept Fax: [phone]     Patient ID: Leah Gonzalez is a 63 y.o. female Established patient    HPI    Leah Gonzalez, was evaluated through a synchronous (real-time) audio-video encounter. The patient (or guardian if applicable) is aware that this is a billable service, which includes applicable co-pays. This Virtual Visit was conducted with patient's (and/or legal guardian's) consent. Patient identification was verified, and a caregiver was present when appropriate.   The patient was located at Home: [address] [place] [phone]  Provider was located at Facility (Appt Dept): [address]  [place] [address]  Confirm you are appropriately licensed, registered, or certified to deliver care in the state where the patient is located as indicated above. If you are not or unsure, please re-schedule the visit: Yes, I confirm.     Leah Gonzalez (1960) presenting virtually for evaluation of the following:  She is having trouble with her foot and is going through PT and taking gabapentin for it and think it is related to her back.  - she isn't having as many panic attacks anymore maybe 1-2 a month, she does use the xanax for sleeping sometimes but only about once a week. She is feeling good overall.   - pt states she is a carb junky and loves pasta     - she has completed physical therapy for her leg     Otherwise pt doing well on current tx and no other concerns today.     The patient's past medical, surgical, social, and family history as well as his current medications and allergies were reviewed as documented in today's encounter by RAJINDER Gilliam.      Previous office notes, labs, imaging and hospital records were reviewed prior to and during encounter.    Current Outpatient Medications on File Prior to Visit

## 2024-08-26 PROBLEM — G89.29 CHRONIC MIDLINE LOW BACK PAIN WITHOUT SCIATICA: Status: ACTIVE | Noted: 2024-08-26

## 2024-08-26 PROBLEM — E83.42 HYPOMAGNESEMIA: Status: ACTIVE | Noted: 2024-08-26

## 2024-08-26 PROBLEM — M54.50 CHRONIC MIDLINE LOW BACK PAIN WITHOUT SCIATICA: Status: ACTIVE | Noted: 2024-08-26

## 2024-08-26 PROBLEM — D64.9 ANEMIA: Status: ACTIVE | Noted: 2024-08-26

## 2024-08-26 PROBLEM — M84.48XA SACRAL INSUFFICIENCY FRACTURE: Status: ACTIVE | Noted: 2024-08-26

## 2024-09-03 DIAGNOSIS — M54.16 LUMBAR BACK PAIN WITH RADICULOPATHY AFFECTING LEFT LOWER EXTREMITY: ICD-10-CM

## 2024-09-03 RX ORDER — GABAPENTIN 300 MG/1
300 CAPSULE ORAL 3 TIMES DAILY
Qty: 90 CAPSULE | Refills: 0 | Status: SHIPPED | OUTPATIENT
Start: 2024-09-03 | End: 2024-10-03

## 2024-09-12 ENCOUNTER — OFFICE VISIT (OUTPATIENT)
Dept: GYNECOLOGIC ONCOLOGY | Age: 64
End: 2024-09-12
Payer: COMMERCIAL

## 2024-09-12 VITALS
SYSTOLIC BLOOD PRESSURE: 151 MMHG | TEMPERATURE: 97.4 F | DIASTOLIC BLOOD PRESSURE: 85 MMHG | WEIGHT: 206.6 LBS | HEART RATE: 88 BPM | BODY MASS INDEX: 37.78 KG/M2 | OXYGEN SATURATION: 96 %

## 2024-09-12 DIAGNOSIS — E83.42 HYPOMAGNESEMIA: Primary | ICD-10-CM

## 2024-09-12 PROCEDURE — 3079F DIAST BP 80-89 MM HG: CPT | Performed by: OBSTETRICS & GYNECOLOGY

## 2024-09-12 PROCEDURE — 99215 OFFICE O/P EST HI 40 MIN: CPT | Performed by: OBSTETRICS & GYNECOLOGY

## 2024-09-12 PROCEDURE — 3077F SYST BP >= 140 MM HG: CPT | Performed by: OBSTETRICS & GYNECOLOGY

## 2024-09-12 ASSESSMENT — ENCOUNTER SYMPTOMS
NAUSEA: 0
RECTAL PAIN: 0
CHEST TIGHTNESS: 0
ABDOMINAL DISTENTION: 0
SHORTNESS OF BREATH: 0
VOMITING: 0
COUGH: 0
HEMOPTYSIS: 0
CONSTIPATION: 0
BLOOD IN STOOL: 0
VOICE CHANGE: 0
SORE THROAT: 0
ABDOMINAL PAIN: 0
TROUBLE SWALLOWING: 0
EYE PROBLEMS: 0
WHEEZING: 0
SCLERAL ICTERUS: 0
DIARRHEA: 0
BACK PAIN: 0

## 2024-10-02 ENCOUNTER — HOSPITAL ENCOUNTER (OUTPATIENT)
Age: 64
Discharge: HOME | End: 2024-10-02
Payer: COMMERCIAL

## 2024-10-02 DIAGNOSIS — C53.8 MALIGNANT NEOPLASM OF OVERLAPPING SITES OF CERVIX UTERI (HCC): ICD-10-CM

## 2024-10-02 DIAGNOSIS — D64.9 ANEMIA, UNSPECIFIED TYPE: ICD-10-CM

## 2024-10-02 DIAGNOSIS — E83.42 HYPOMAGNESEMIA: ICD-10-CM

## 2024-10-02 LAB
ALBUMIN SERPL-MCNC: 4.3 G/DL (ref 3.5–5.2)
ALP SERPL-CCNC: 86 U/L (ref 35–104)
ALT SERPL-CCNC: 13 U/L (ref 5–33)
ANION GAP SERPL CALCULATED.3IONS-SCNC: 13 MMOL/L (ref 9–17)
AST SERPL-CCNC: 20 U/L
BASOPHILS # BLD: 0.05 K/UL (ref 0–0.2)
BASOPHILS NFR BLD: 1 % (ref 0–2)
BILIRUB SERPL-MCNC: 0.5 MG/DL (ref 0.3–1.2)
BUN SERPL-MCNC: 15 MG/DL (ref 8–23)
BUN/CREAT SERPL: 19 (ref 9–20)
CALCIUM SERPL-MCNC: 9.5 MG/DL (ref 8.6–10.4)
CHLORIDE SERPL-SCNC: 102 MMOL/L (ref 98–107)
CO2 SERPL-SCNC: 24 MMOL/L (ref 20–31)
CREAT SERPL-MCNC: 0.8 MG/DL (ref 0.5–0.9)
EOSINOPHIL # BLD: 0.12 K/UL (ref 0–0.44)
EOSINOPHILS RELATIVE PERCENT: 2 % (ref 1–4)
ERYTHROCYTE [DISTWIDTH] IN BLOOD BY AUTOMATED COUNT: 14.3 % (ref 11.8–14.4)
GFR, ESTIMATED: 83 ML/MIN/1.73M2
GLUCOSE SERPL-MCNC: 136 MG/DL (ref 70–99)
HCT VFR BLD AUTO: 37.6 % (ref 36.3–47.1)
HGB BLD-MCNC: 12.4 G/DL (ref 11.9–15.1)
IMM GRANULOCYTES # BLD AUTO: 0.04 K/UL (ref 0–0.3)
IMM GRANULOCYTES NFR BLD: 1 %
LYMPHOCYTES NFR BLD: 1.02 K/UL (ref 1.1–3.7)
LYMPHOCYTES RELATIVE PERCENT: 17 % (ref 24–43)
MAGNESIUM SERPL-MCNC: 1.5 MG/DL (ref 1.6–2.6)
MCH RBC QN AUTO: 30.2 PG (ref 25.2–33.5)
MCHC RBC AUTO-ENTMCNC: 33 G/DL (ref 28.4–34.8)
MCV RBC AUTO: 91.7 FL (ref 82.6–102.9)
MONOCYTES NFR BLD: 0.51 K/UL (ref 0.1–1.2)
MONOCYTES NFR BLD: 9 % (ref 3–12)
NEUTROPHILS NFR BLD: 71 % (ref 36–65)
NEUTS SEG NFR BLD: 4.15 K/UL (ref 1.5–8.1)
NRBC BLD-RTO: 0 PER 100 WBC
PLATELET # BLD AUTO: 152 K/UL (ref 138–453)
PMV BLD AUTO: 11.3 FL (ref 8.1–13.5)
POTASSIUM SERPL-SCNC: 4.3 MMOL/L (ref 3.7–5.3)
PROT SERPL-MCNC: 7.8 G/DL (ref 6.4–8.3)
RBC # BLD AUTO: 4.1 M/UL (ref 3.95–5.11)
SODIUM SERPL-SCNC: 139 MMOL/L (ref 135–144)
TSH SERPL DL<=0.05 MIU/L-ACNC: 3.62 UIU/ML (ref 0.3–5)
WBC OTHER # BLD: 5.9 K/UL (ref 3.5–11.3)

## 2024-10-02 PROCEDURE — 80053 COMPREHEN METABOLIC PANEL: CPT

## 2024-10-02 PROCEDURE — 36415 COLL VENOUS BLD VENIPUNCTURE: CPT

## 2024-10-02 PROCEDURE — 85025 COMPLETE CBC W/AUTO DIFF WBC: CPT

## 2024-10-02 PROCEDURE — 84443 ASSAY THYROID STIM HORMONE: CPT

## 2024-10-02 PROCEDURE — 83735 ASSAY OF MAGNESIUM: CPT

## 2024-10-19 DIAGNOSIS — M80.00XA AGE-RELATED OSTEOPOROSIS WITH CURRENT PATHOLOGICAL FRACTURE, INITIAL ENCOUNTER: ICD-10-CM

## 2024-10-21 ENCOUNTER — HOSPITAL ENCOUNTER (OUTPATIENT)
Dept: NEUROLOGY | Age: 64
Discharge: HOME | End: 2024-10-21
Payer: COMMERCIAL

## 2024-10-21 DIAGNOSIS — M21.372 FOOT DROP, LEFT: ICD-10-CM

## 2024-10-21 PROCEDURE — 95886 MUSC TEST DONE W/N TEST COMP: CPT | Performed by: PHYSICAL MEDICINE & REHABILITATION

## 2024-10-21 PROCEDURE — 95911 NRV CNDJ TEST 9-10 STUDIES: CPT | Performed by: PHYSICAL MEDICINE & REHABILITATION

## 2024-10-21 RX ORDER — ALENDRONATE SODIUM 70 MG/1
TABLET ORAL
Qty: 12 TABLET | Refills: 3 | Status: SHIPPED | OUTPATIENT
Start: 2024-10-21

## 2024-10-24 ENCOUNTER — TELEPHONE (OUTPATIENT)
Dept: ORTHOPEDIC SURGERY | Age: 64
End: 2024-10-24

## 2024-10-24 NOTE — TELEPHONE ENCOUNTER
----- Message from GEENA Clarke sent at 10/24/2024  9:56 AM EDT -----  Please have her follow up with me in the next few weeks regarding these findings.

## 2024-10-28 ENCOUNTER — INITIAL CONSULT (OUTPATIENT)
Dept: PAIN MANAGEMENT | Age: 64
End: 2024-10-28
Payer: COMMERCIAL

## 2024-10-28 VITALS — HEIGHT: 62 IN | WEIGHT: 206 LBS | BODY MASS INDEX: 37.91 KG/M2

## 2024-10-28 DIAGNOSIS — M79.605 LEFT LEG PAIN: ICD-10-CM

## 2024-10-28 DIAGNOSIS — M46.1 SACROILIITIS (HCC): ICD-10-CM

## 2024-10-28 DIAGNOSIS — M47.817 LUMBOSACRAL SPONDYLOSIS WITHOUT MYELOPATHY: Primary | ICD-10-CM

## 2024-10-28 DIAGNOSIS — M54.17 LUMBOSACRAL NEURITIS: ICD-10-CM

## 2024-10-28 PROCEDURE — 99204 OFFICE O/P NEW MOD 45 MIN: CPT | Performed by: PAIN MEDICINE

## 2024-10-28 NOTE — PROGRESS NOTES
THAN 5 YEARS 5/31/2023 MD JEANA Lundberg SPECIAL PROCEDURES / Removed 9/2023    SPINE SURGERY N/A 1/11/2024    KYPHOPLASTY LUMBAR SACRAL VERTEBROPLASTY L5-S1 performed by Juno French MD at [facility] OR    TUBAL LIGATION      TUNNELED VENOUS PORT PLACEMENT  05/31/2023    chemo port    VAGINA EXAMINATION UNDER ANESTHESIA  07/20/2023    EXAMINATION UNDER ANESTHESIA, CERVICAL DILATION, PLACEMENT OF PAULIE SLEEVE, ULTRASOUND GUIDANCE (Cervix       Allergies   Allergen Reactions    Atorvastatin Other (See Comments)     Causes Muscle cramps         Current Outpatient Medications:     alendronate (FOSAMAX) 70 MG tablet, TAKE 1 TABLET BY MOUTH WEEKLY  WITH 8 OZ OF PLAIN WATER 30  MINUTES BEFORE FIRST FOOD, DRINK OR MEDS. STAY UPRIGHT FOR 30  MINS, Disp: 12 tablet, Rfl: 3    rosuvastatin (CRESTOR) 5 MG tablet, Take 1 tablet by mouth nightly, Disp: 90 tablet, Rfl: 3    calcium carbonate 600 MG TABS tablet, Take 1 tablet by mouth 2 times daily, Disp: , Rfl:     BLACK CURRANT SEED OIL PO, Take by mouth 1 teaspoon daily, Disp: , Rfl:     Multiple Vitamins-Minerals (MULTIVITAMIN ADULTS 50+) TABS, Take by mouth, Disp: , Rfl:     gabapentin (NEURONTIN) 300 MG capsule, Take 1 capsule by mouth 3 times daily for 30 days., Disp: 90 capsule, Rfl: 0    Family History   Problem Relation Age of Onset    High Blood Pressure Father     COPD Father     High Cholesterol Father     Cataracts Father     High Blood Pressure Sister     High Cholesterol Sister     Thyroid Disease Sister     Anxiety Disorder Sister     Emphysema Brother     Heart Failure Brother        Social History     Socioeconomic History    Marital status:      Spouse name: Not on file    Number of children: Not on file    Years of education: Not on file    Highest education level: Not on file   Occupational History    Not on file   Tobacco Use    Smoking status: Former     Current packs/day: 0.00     Average packs/day: 1 pack/day for 43.0 years (43.0 ttl pk-yrs)

## 2024-10-31 ENCOUNTER — OFFICE VISIT (OUTPATIENT)
Dept: ORTHOPEDIC SURGERY | Age: 64
End: 2024-10-31
Payer: COMMERCIAL

## 2024-10-31 VITALS — HEIGHT: 62 IN | WEIGHT: 206 LBS | BODY MASS INDEX: 37.91 KG/M2

## 2024-10-31 DIAGNOSIS — M21.372 LEFT FOOT DROP: Primary | ICD-10-CM

## 2024-10-31 DIAGNOSIS — M54.16 LUMBAR RADICULOPATHY: ICD-10-CM

## 2024-10-31 DIAGNOSIS — M54.16 LUMBAR BACK PAIN WITH RADICULOPATHY AFFECTING LEFT LOWER EXTREMITY: ICD-10-CM

## 2024-10-31 PROCEDURE — 99213 OFFICE O/P EST LOW 20 MIN: CPT | Performed by: PHYSICIAN ASSISTANT

## 2024-10-31 RX ORDER — GABAPENTIN 300 MG/1
300 CAPSULE ORAL 3 TIMES DAILY
Qty: 270 CAPSULE | Refills: 0 | Status: SHIPPED | OUTPATIENT
Start: 2024-10-31 | End: 2025-01-29

## 2024-10-31 RX ORDER — CYCLOBENZAPRINE HCL 10 MG
10 TABLET ORAL 3 TIMES DAILY PRN
Qty: 21 TABLET | Refills: 0 | Status: SHIPPED | OUTPATIENT
Start: 2024-10-31 | End: 2024-11-10

## 2024-10-31 NOTE — PROGRESS NOTES
Panic disorder      Past Surgical History:   Procedure Laterality Date    CERVIX BIOPSY N/A 07/20/2023    EXAMINATION UNDER ANESTHESIA, CERVICAL DILATION, PLACEMENT OF PAULIE SLEEVE, ULTRASOUND GUIDANCE performed by Mary Roach MD at [facility] OR    CHOLECYSTECTOMY      DENTAL SURGERY      teeth pulled due to gum disease    IR PORT PLACEMENT EQUAL OR GREATER THAN 5 YEARS  05/31/2023    IR PORT PLACEMENT EQUAL OR GREATER THAN 5 YEARS 5/31/2023 Keo Koenig MD [facility] SPECIAL PROCEDURES / Removed 9/2023    SPINE SURGERY N/A 1/11/2024    KYPHOPLASTY LUMBAR SACRAL VERTEBROPLASTY L5-S1 performed by Juno French MD at [facility] OR    TUBAL LIGATION      TUNNELED VENOUS PORT PLACEMENT  05/31/2023    chemo port    VAGINA EXAMINATION UNDER ANESTHESIA  07/20/2023    EXAMINATION UNDER ANESTHESIA, CERVICAL DILATION, PLACEMENT OF PAULIE SLEEVE, ULTRASOUND GUIDANCE (Cervix     Family History   Problem Relation Age of Onset    High Blood Pressure Father     COPD Father     High Cholesterol Father     Cataracts Father     High Blood Pressure Sister     High Cholesterol Sister     Thyroid Disease Sister     Anxiety Disorder Sister     Emphysema Brother     Heart Failure Brother         Physical Exam:  Vitals signs and nursing note reviewed.   Constitutional:       Appearance: well-developed.   HENT:      Head: Normocephalic and atraumatic.      Nose: Nose normal.   Eyes:      Conjunctiva/sclera: Conjunctivae normal.   Neck:      Musculoskeletal: Normal range of motion and neck supple.   Pulmonary:      Effort: Pulmonary effort is normal. No respiratory distress.   Neurological:      Mental Status: Alert and oriented to person, place, and time.      Sensory: No sensory deficit.   Psychiatric:         Behavior: Behavior normal.         Thought Content: Thought content normal.  Skin:     General: Skin is warm and dry.   Musculoskeletal:      Comments: Normal gait  Dorsiflexion of the foot is 2 out of 5.  Patient's been wearing an AFO

## 2024-11-01 ENCOUNTER — TELEMEDICINE (OUTPATIENT)
Dept: FAMILY MEDICINE CLINIC | Age: 64
End: 2024-11-01
Payer: COMMERCIAL

## 2024-11-01 DIAGNOSIS — F41.0 PANIC DISORDER: Primary | ICD-10-CM

## 2024-11-01 DIAGNOSIS — E78.5 HYPERLIPIDEMIA, UNSPECIFIED HYPERLIPIDEMIA TYPE: ICD-10-CM

## 2024-11-01 DIAGNOSIS — M25.552 LEFT HIP PAIN: ICD-10-CM

## 2024-11-01 DIAGNOSIS — M84.48XS SACRAL INSUFFICIENCY FRACTURE, SEQUELA: ICD-10-CM

## 2024-11-01 DIAGNOSIS — I10 ESSENTIAL HYPERTENSION: ICD-10-CM

## 2024-11-01 DIAGNOSIS — R73.01 IFG (IMPAIRED FASTING GLUCOSE): ICD-10-CM

## 2024-11-01 DIAGNOSIS — C53.8 MALIGNANT NEOPLASM OF OVERLAPPING SITES OF CERVIX UTERI (HCC): ICD-10-CM

## 2024-11-01 DIAGNOSIS — E78.2 MIXED HYPERLIPIDEMIA: ICD-10-CM

## 2024-11-01 PROCEDURE — 99213 OFFICE O/P EST LOW 20 MIN: CPT | Performed by: NURSE PRACTITIONER

## 2024-11-01 RX ORDER — ALPRAZOLAM 0.5 MG
0.5 TABLET ORAL NIGHTLY PRN
Qty: 30 TABLET | Refills: 0 | Status: SHIPPED | OUTPATIENT
Start: 2024-11-01 | End: 2024-12-01

## 2024-11-01 RX ORDER — ROSUVASTATIN CALCIUM 5 MG/1
5 TABLET, COATED ORAL NIGHTLY
Qty: 90 TABLET | Refills: 3 | Status: SHIPPED | OUTPATIENT
Start: 2024-11-01

## 2024-11-01 ASSESSMENT — ENCOUNTER SYMPTOMS
COLOR CHANGE: 0
BACK PAIN: 0
NAUSEA: 0
SORE THROAT: 0
ABDOMINAL DISTENTION: 0
DIARRHEA: 0
SHORTNESS OF BREATH: 0
ABDOMINAL PAIN: 0
RHINORRHEA: 0
CONSTIPATION: 0
CHEST TIGHTNESS: 0
COUGH: 0

## 2024-11-01 NOTE — PROGRESS NOTES
negative item  -- DELETE ALL ITEMS NOT EXAMINED]  Vital Signs: Not completed due to virtual visit.     Constitutional: [x] Appears well-developed and well-nourished [x] No apparent distress                            [] Abnormal-   Mental status  [x] Alert and awake  [x] Oriented to person/place/time [x]Able to follow commands       Eyes:  EOM    [x]  Normal  [] Abnormal-  Sclera  [x]  Normal  [] Abnormal -         Discharge [x]  None visible  [] Abnormal -     HENT:   [x] Normocephalic, atraumatic.  [] Abnormal   [x] Mouth/Throat: Mucous membranes are moist.      External Ears [x] Normal  [] Abnormal-      Neck: [x] No visualized mass      Pulmonary/Chest: [x] Respiratory effort normal.  [x] No visualized signs of difficulty breathing or respiratory distress        [] Abnormal-      Neurological:        [x] No Facial Asymmetry (Cranial nerve 7 motor function) (limited exam to video visit)                       [x] No gaze palsy        [] Abnormal-         Skin:                     [x] No significant exanthematous lesions or discoloration noted on facial skin         [] Abnormal-                                  Psychiatric:           [x] Normal Affect [x] No Hallucinations        [] Abnormal-      Other pertinent observable physical exam findings- Patient appears generally well, is speaking full sentences clearly without any observable SOB, no cough, no diaphoresis. Pt stable and anxiety controlled with current medication.     Assessment:      Diagnosis Orders   1. Panic disorder  ALPRAZolam (XANAX) 0.5 MG tablet      2. Mixed hyperlipidemia        3. Sacral insufficiency fracture, sequela        4. Left hip pain        5. IFG (impaired fasting glucose)        6. Essential hypertension        7. Malignant neoplasm of overlapping sites of cervix uteri (HCC)        8. Hyperlipidemia, unspecified hyperlipidemia type  rosuvastatin (CRESTOR) 5 MG tablet        Plan:     Panic disorder  - Stable: Medication re-filled as

## 2024-11-08 ENCOUNTER — HOSPITAL ENCOUNTER (OUTPATIENT)
Dept: MAMMOGRAPHY | Age: 64
Discharge: HOME | End: 2024-11-10
Payer: COMMERCIAL

## 2024-11-08 VITALS — HEIGHT: 62 IN | WEIGHT: 206 LBS | BODY MASS INDEX: 37.91 KG/M2

## 2024-11-08 DIAGNOSIS — Z12.31 VISIT FOR SCREENING MAMMOGRAM: ICD-10-CM

## 2024-11-08 PROCEDURE — 77063 BREAST TOMOSYNTHESIS BI: CPT

## 2024-11-09 ENCOUNTER — HOSPITAL ENCOUNTER (OUTPATIENT)
Dept: MRI IMAGING | Age: 64
Discharge: HOME | End: 2024-11-11
Attending: PAIN MEDICINE
Payer: COMMERCIAL

## 2024-11-09 ENCOUNTER — APPOINTMENT (OUTPATIENT)
Dept: MRI IMAGING | Age: 64
End: 2024-11-09
Attending: PAIN MEDICINE
Payer: COMMERCIAL

## 2024-11-09 DIAGNOSIS — M46.1 SACROILIITIS (HCC): ICD-10-CM

## 2024-11-09 DIAGNOSIS — M54.17 LUMBOSACRAL NEURITIS: ICD-10-CM

## 2024-11-09 DIAGNOSIS — M79.605 LEFT LEG PAIN: ICD-10-CM

## 2024-11-09 PROCEDURE — 72195 MRI PELVIS W/O DYE: CPT

## 2024-11-09 PROCEDURE — 72148 MRI LUMBAR SPINE W/O DYE: CPT

## 2024-11-21 ENCOUNTER — OFFICE VISIT (OUTPATIENT)
Dept: ORTHOPEDIC SURGERY | Age: 64
End: 2024-11-21
Payer: COMMERCIAL

## 2024-11-21 ENCOUNTER — PREP FOR PROCEDURE (OUTPATIENT)
Dept: ORTHOPEDIC SURGERY | Age: 64
End: 2024-11-21

## 2024-11-21 ENCOUNTER — TELEPHONE (OUTPATIENT)
Dept: ORTHOPEDIC SURGERY | Age: 64
End: 2024-11-21

## 2024-11-21 VITALS — HEIGHT: 62 IN | BODY MASS INDEX: 37.91 KG/M2 | RESPIRATION RATE: 14 BRPM | WEIGHT: 206 LBS

## 2024-11-21 DIAGNOSIS — G57.31 RIGHT PERONEAL NERVE PALSY: Primary | ICD-10-CM

## 2024-11-21 DIAGNOSIS — G57.31 RIGHT PERONEAL NERVE PALSY: ICD-10-CM

## 2024-11-21 DIAGNOSIS — M21.372 LEFT FOOT DROP: Primary | ICD-10-CM

## 2024-11-21 PROBLEM — G57.30 PERONEAL NERVE PALSY: Status: ACTIVE | Noted: 2024-11-21

## 2024-11-21 PROCEDURE — 99213 OFFICE O/P EST LOW 20 MIN: CPT | Performed by: ORTHOPAEDIC SURGERY

## 2024-11-21 NOTE — TELEPHONE ENCOUNTER
Called and spoke with patient regarding surgery scheduling.  Information including dates and times given to patient via phone.

## 2024-11-21 NOTE — PROGRESS NOTES
Patient ID: Leah Gonzalez is a 64 y.o. female    Chief Compliant:  No chief complaint on file.       Diagnostic imaging:    EMGs are reviewed patient does have a left peroneal nerve palsy    Assessment and Plan:  1. Left foot drop    2. Right peroneal nerve palsy        S/p sacral kyphoplasty, 1/11/2024    Left foot drop    At this time, the patient is ready to proceed with peroneal nerve release. Risks, benefits, possible complications, and alternatives to therapy were discussed with the patient.  Risks including no relief of pain, infection, neurovascular injury, bleeding, transfusion, need for future surgery, systemic and anesthetic complications were discussed.  The patient understands and wishes to proceed.      Follow up 2 weeks post op    HPI:  This is a 64 y.o. female who presents to the clinic today for follow of left leg pain.     Patient was previously seen by Karen Clarke PA-C on 10/31/2024.     Patient followed with pain management and PT without much relief    By palpation of the shin her pain is severely exacerbated.     She notes she doesn't have any low back pain but prominent numbness tingling burning in her foot    Review of Systems   All other systems reviewed and are negative.      Past History:    Current Outpatient Medications:     rosuvastatin (CRESTOR) 5 MG tablet, Take 1 tablet by mouth nightly, Disp: 90 tablet, Rfl: 3    ALPRAZolam (XANAX) 0.5 MG tablet, Take 1 tablet by mouth nightly as needed for Sleep for up to 30 days. Max Daily Amount: 0.5 mg, Disp: 30 tablet, Rfl: 0    gabapentin (NEURONTIN) 300 MG capsule, Take 1 capsule by mouth 3 times daily for 90 days., Disp: 270 capsule, Rfl: 0    alendronate (FOSAMAX) 70 MG tablet, TAKE 1 TABLET BY MOUTH WEEKLY  WITH 8 OZ OF PLAIN WATER 30  MINUTES BEFORE FIRST FOOD, DRINK OR MEDS. STAY UPRIGHT FOR 30  MINS, Disp: 12 tablet, Rfl: 3    calcium carbonate 600 MG TABS tablet, Take 1 tablet by mouth 2 times daily, Disp: , Rfl:     BLACK CURRANT SEED

## 2024-12-27 NOTE — H&P
HISTORY and PHYSICAL  [facility]       NAME:  Leah Gonzalez  MRN: [id number]   YOB: 1960   Date: 12/30/2024   Age: 64 y.o.  Gender: female     COMPLAINT AND PRESENT HISTORY:   Leah Gonzalez is 64 y.o.,  female, presents for pre-anesthesia/admission testing for PERONEAL NERVE RELEASE LEFT FOOT per Dr. French.  Primary dx: Peroneal nerve palsy [G57.30].    HPI:  See portion of the note below per Dr French from office visit on   Diagnostic imaging:     EMGs are reviewed patient does have a left peroneal nerve palsy     Assessment and Plan:  1. Left foot drop    2. Right peroneal nerve palsy          S/p sacral kyphoplasty, 1/11/2024     Left foot drop     At this time, the patient is ready to proceed with peroneal nerve release. Risks, benefits, possible complications, and alternatives to therapy were discussed with the patient.  Risks including no relief of pain, infection, neurovascular injury, bleeding, transfusion, need for future surgery, systemic and anesthetic complications were discussed.  The patient understands and wishes to proceed.       Follow up 2 weeks post op     HPI:  This is a 64 y.o. female who presents to the clinic today for follow of left leg pain.      Patient was previously seen by Karen Clarke PA-C on 10/31/2024.      Patient followed with pain management and PT without much relief     By palpation of the shin her pain is severely exacerbated.      She notes she doesn't have any low back pain but prominent numbness tingling burning in her foot       Update HPI:    Leah Gonzalez is 64 y.o.,  female, complain of constant burning pain in the left leg from the knee down associated symptoms  numbness/tingling, swelling , and left foot drop.  Pain started one year ago. Pain rated  10/10.  Pt denies recent falls or trauma, no redness ro rashes.  Pain aggravated walking , standing for long time, climbing the stairs  Pt currently on Neurontin and ibuprofen PRN  with mild

## 2024-12-27 NOTE — H&P (VIEW-ONLY)
HISTORY and PHYSICAL  [facility]       NAME:  Leah Gonzalez  MRN: [id number]   YOB: 1960   Date: 12/30/2024   Age: 64 y.o.  Gender: female     COMPLAINT AND PRESENT HISTORY:   Leah Gonzalez is 64 y.o.,  female, presents for pre-anesthesia/admission testing for PERONEAL NERVE RELEASE LEFT FOOT per Dr. French.  Primary dx: Peroneal nerve palsy [G57.30].    HPI:  See portion of the note below per Dr French from office visit on   Diagnostic imaging:     EMGs are reviewed patient does have a left peroneal nerve palsy     Assessment and Plan:  1. Left foot drop    2. Right peroneal nerve palsy          S/p sacral kyphoplasty, 1/11/2024     Left foot drop     At this time, the patient is ready to proceed with peroneal nerve release. Risks, benefits, possible complications, and alternatives to therapy were discussed with the patient.  Risks including no relief of pain, infection, neurovascular injury, bleeding, transfusion, need for future surgery, systemic and anesthetic complications were discussed.  The patient understands and wishes to proceed.       Follow up 2 weeks post op     HPI:  This is a 64 y.o. female who presents to the clinic today for follow of left leg pain.      Patient was previously seen by Karen Clarke PA-C on 10/31/2024.      Patient followed with pain management and PT without much relief     By palpation of the shin her pain is severely exacerbated.      She notes she doesn't have any low back pain but prominent numbness tingling burning in her foot       Update HPI:    Leah Gonzalez is 64 y.o.,  female, complain of constant burning pain in the left leg from the knee down associated symptoms  numbness/tingling, swelling , and left foot drop.  Pain started one year ago. Pain rated  10/10.  Pt denies recent falls or trauma, no redness ro rashes.  Pain aggravated walking , standing for long time, climbing the stairs  Pt currently on Neurontin and ibuprofen PRN  with mild

## 2024-12-30 ENCOUNTER — HOSPITAL ENCOUNTER (OUTPATIENT)
Dept: PREADMISSION TESTING | Age: 64
Discharge: HOME | End: 2025-01-03
Attending: ORTHOPAEDIC SURGERY | Admitting: ORTHOPAEDIC SURGERY
Payer: COMMERCIAL

## 2024-12-30 VITALS
TEMPERATURE: 98.4 F | WEIGHT: 206 LBS | HEIGHT: 63 IN | HEART RATE: 83 BPM | RESPIRATION RATE: 18 BRPM | SYSTOLIC BLOOD PRESSURE: 148 MMHG | BODY MASS INDEX: 36.5 KG/M2 | DIASTOLIC BLOOD PRESSURE: 90 MMHG | OXYGEN SATURATION: 97 %

## 2024-12-30 DIAGNOSIS — Z01.818 PREOP EXAMINATION: Primary | ICD-10-CM

## 2024-12-30 LAB
ANION GAP SERPL CALCULATED.3IONS-SCNC: 12 MMOL/L (ref 9–16)
BASOPHILS # BLD: 0 K/UL (ref 0–0.2)
BASOPHILS NFR BLD: 1 % (ref 0–2)
BUN SERPL-MCNC: 12 MG/DL (ref 8–23)
CALCIUM SERPL-MCNC: 9.9 MG/DL (ref 8.6–10.4)
CHLORIDE SERPL-SCNC: 100 MMOL/L (ref 98–107)
CO2 SERPL-SCNC: 28 MMOL/L (ref 20–31)
CREAT SERPL-MCNC: 0.9 MG/DL (ref 0.7–1.2)
EOSINOPHIL # BLD: 0.1 K/UL (ref 0–0.4)
EOSINOPHILS RELATIVE PERCENT: 2 % (ref 0–4)
ERYTHROCYTE [DISTWIDTH] IN BLOOD BY AUTOMATED COUNT: 15.2 % (ref 11.5–14.9)
GFR, ESTIMATED: 72 ML/MIN/1.73M2
GLUCOSE SERPL-MCNC: 162 MG/DL (ref 74–99)
HCT VFR BLD AUTO: 36.5 % (ref 36–46)
HGB BLD-MCNC: 12.5 G/DL (ref 12–16)
LYMPHOCYTES NFR BLD: 0.9 K/UL (ref 1–4.8)
LYMPHOCYTES RELATIVE PERCENT: 14 % (ref 24–44)
MCH RBC QN AUTO: 30.2 PG (ref 26–34)
MCHC RBC AUTO-ENTMCNC: 34.2 G/DL (ref 31–37)
MCV RBC AUTO: 88.4 FL (ref 80–100)
MONOCYTES NFR BLD: 0.5 K/UL (ref 0.1–1.3)
MONOCYTES NFR BLD: 7 % (ref 1–7)
NEUTROPHILS NFR BLD: 76 % (ref 36–66)
NEUTS SEG NFR BLD: 4.8 K/UL (ref 1.3–9.1)
PLATELET # BLD AUTO: 164 K/UL (ref 150–450)
PMV BLD AUTO: 9.3 FL (ref 6–12)
POTASSIUM SERPL-SCNC: 4.4 MMOL/L (ref 3.7–5.3)
RBC # BLD AUTO: 4.13 M/UL (ref 4–5.2)
SODIUM SERPL-SCNC: 140 MMOL/L (ref 136–145)
WBC OTHER # BLD: 6.3 K/UL (ref 3.5–11)

## 2024-12-30 PROCEDURE — 85025 COMPLETE CBC W/AUTO DIFF WBC: CPT

## 2024-12-30 PROCEDURE — 93005 ELECTROCARDIOGRAM TRACING: CPT | Performed by: ANESTHESIOLOGY

## 2024-12-30 PROCEDURE — 36415 COLL VENOUS BLD VENIPUNCTURE: CPT

## 2024-12-30 PROCEDURE — 80048 BASIC METABOLIC PNL TOTAL CA: CPT

## 2024-12-30 PROCEDURE — APPSS45 APP SPLIT SHARED TIME 31-45 MINUTES: Performed by: NURSE PRACTITIONER

## 2024-12-30 ASSESSMENT — ENCOUNTER SYMPTOMS
GASTROINTESTINAL NEGATIVE: 1
RESPIRATORY NEGATIVE: 1

## 2024-12-30 NOTE — DISCHARGE INSTRUCTIONS
of Surgery:  Arrive at [facility] Surgery Entrance at the time directed by your surgeon and check in at the desk.     If you have a living will or healthcare power of , please bring a copy.    You will be taken to the pre-op holding area where you will be prepared for surgery.  A physical assessment will be performed by a nurse practitioner or house officer.  Your IV will be started and you will meet your anesthesiologist.    When you go to surgery, your family will be directed to the surgical waiting room, where the doctor should speak with them after your surgery.    After surgery, you will be taken to the recovery area.  When you are alert and stable, you will receive instructions and be prepared for discharge.     If you use a Bi-PAP or C-PAP machine, please bring it with you and leave it in the car in case it is needed in recovery room.

## 2024-12-31 LAB
EKG ATRIAL RATE: 76 BPM
EKG P AXIS: 36 DEGREES
EKG P-R INTERVAL: 166 MS
EKG Q-T INTERVAL: 368 MS
EKG QRS DURATION: 78 MS
EKG QTC CALCULATION (BAZETT): 414 MS
EKG R AXIS: 24 DEGREES
EKG T AXIS: 20 DEGREES
EKG VENTRICULAR RATE: 76 BPM

## 2024-12-31 PROCEDURE — 93010 ELECTROCARDIOGRAM REPORT: CPT | Performed by: INTERNAL MEDICINE

## 2025-01-01 DIAGNOSIS — M54.16 LUMBAR BACK PAIN WITH RADICULOPATHY AFFECTING LEFT LOWER EXTREMITY: ICD-10-CM

## 2025-01-02 ENCOUNTER — HOSPITAL ENCOUNTER (OUTPATIENT)
Dept: NUCLEAR MEDICINE | Age: 65
Discharge: HOME | End: 2025-01-04
Attending: OBSTETRICS & GYNECOLOGY
Payer: COMMERCIAL

## 2025-01-02 DIAGNOSIS — C53.8 MALIGNANT NEOPLASM OF OVERLAPPING SITES OF CERVIX UTERI (HCC): ICD-10-CM

## 2025-01-02 LAB — GLUCOSE BLD-MCNC: 128 MG/DL (ref 65–105)

## 2025-01-02 PROCEDURE — 3430000000 HC RX DIAGNOSTIC RADIOPHARMACEUTICAL: Performed by: OBSTETRICS & GYNECOLOGY

## 2025-01-02 PROCEDURE — A9609 HC RX DIAGNOSTIC RADIOPHARMACEUTICAL: HCPCS | Performed by: OBSTETRICS & GYNECOLOGY

## 2025-01-02 PROCEDURE — 82947 ASSAY GLUCOSE BLOOD QUANT: CPT

## 2025-01-02 PROCEDURE — 78815 PET IMAGE W/CT SKULL-THIGH: CPT

## 2025-01-02 PROCEDURE — 2500000003 HC RX 250 WO HCPCS: Performed by: OBSTETRICS & GYNECOLOGY

## 2025-01-02 RX ORDER — FLUDEOXYGLUCOSE F 18 200 MCI/ML
10 INJECTION, SOLUTION INTRAVENOUS
Status: COMPLETED | OUTPATIENT
Start: 2025-01-02 | End: 2025-01-02

## 2025-01-02 RX ORDER — GABAPENTIN 300 MG/1
300 CAPSULE ORAL 3 TIMES DAILY
Qty: 270 CAPSULE | Refills: 3 | OUTPATIENT
Start: 2025-01-02

## 2025-01-02 RX ORDER — SODIUM CHLORIDE 0.9 % (FLUSH) 0.9 %
10 SYRINGE (ML) INJECTION
Status: COMPLETED | OUTPATIENT
Start: 2025-01-02 | End: 2025-01-02

## 2025-01-02 RX ADMIN — FLUDEOXYGLUCOSE F 18 10.28 MILLICURIE: 200 INJECTION, SOLUTION INTRAVENOUS at 12:12

## 2025-01-02 RX ADMIN — SODIUM CHLORIDE, PRESERVATIVE FREE 10 ML: 5 INJECTION INTRAVENOUS at 12:12

## 2025-01-07 ENCOUNTER — ANESTHESIA EVENT (OUTPATIENT)
Dept: OPERATING ROOM | Age: 65
End: 2025-01-07
Payer: COMMERCIAL

## 2025-01-07 NOTE — PRE-PROCEDURE INSTRUCTIONS
Nothing to eat after midnight.  Are you taking any blood thinners? When was the last day?  Make sure to use Hibiclens prior to surgery.  Remove any jewelry and body piercings.  Do you wear glasses? If so, please bring a case to store them in.  Are you having any Covid symptoms?  Do you have any new rashes, infections, etc. that we should be aware of?  Do you have a ride home the day of surgery? It cannot be a cab or medical transportation.  Verify surgery time and what time to arrive at hospital.   Left message regarding arrival time, procedure time, need for , npo at midnight tonight, bathe with hibiclens, pre op phone number for any questions.

## 2025-01-08 ENCOUNTER — HOSPITAL ENCOUNTER (OUTPATIENT)
Age: 65
Setting detail: OUTPATIENT SURGERY
Discharge: HOME | End: 2025-01-08
Attending: ORTHOPAEDIC SURGERY | Admitting: ORTHOPAEDIC SURGERY
Payer: COMMERCIAL

## 2025-01-08 ENCOUNTER — ANESTHESIA (OUTPATIENT)
Dept: OPERATING ROOM | Age: 65
End: 2025-01-08
Payer: COMMERCIAL

## 2025-01-08 VITALS
RESPIRATION RATE: 18 BRPM | HEIGHT: 63 IN | SYSTOLIC BLOOD PRESSURE: 138 MMHG | HEART RATE: 89 BPM | TEMPERATURE: 96.9 F | WEIGHT: 206 LBS | BODY MASS INDEX: 36.5 KG/M2 | OXYGEN SATURATION: 99 % | DIASTOLIC BLOOD PRESSURE: 76 MMHG

## 2025-01-08 DIAGNOSIS — G57.32 LEFT PERONEAL NERVE PALSY: Primary | ICD-10-CM

## 2025-01-08 PROCEDURE — 3600000012 HC SURGERY LEVEL 2 ADDTL 15MIN: Performed by: ORTHOPAEDIC SURGERY

## 2025-01-08 PROCEDURE — 7100000011 HC PHASE II RECOVERY - ADDTL 15 MIN: Performed by: ORTHOPAEDIC SURGERY

## 2025-01-08 PROCEDURE — 7100000000 HC PACU RECOVERY - FIRST 15 MIN: Performed by: ORTHOPAEDIC SURGERY

## 2025-01-08 PROCEDURE — 2580000003 HC RX 258: Performed by: ANESTHESIOLOGY

## 2025-01-08 PROCEDURE — 6370000000 HC RX 637 (ALT 250 FOR IP): Performed by: ANESTHESIOLOGY

## 2025-01-08 PROCEDURE — 7100000031 HC ASPR PHASE II RECOVERY - ADDTL 15 MIN: Performed by: ORTHOPAEDIC SURGERY

## 2025-01-08 PROCEDURE — 7100000030 HC ASPR PHASE II RECOVERY - FIRST 15 MIN: Performed by: ORTHOPAEDIC SURGERY

## 2025-01-08 PROCEDURE — 7100000010 HC PHASE II RECOVERY - FIRST 15 MIN: Performed by: ORTHOPAEDIC SURGERY

## 2025-01-08 PROCEDURE — 2709999900 HC NON-CHARGEABLE SUPPLY: Performed by: ORTHOPAEDIC SURGERY

## 2025-01-08 PROCEDURE — 3600000002 HC SURGERY LEVEL 2 BASE: Performed by: ORTHOPAEDIC SURGERY

## 2025-01-08 PROCEDURE — 2500000003 HC RX 250 WO HCPCS: Performed by: ORTHOPAEDIC SURGERY

## 2025-01-08 PROCEDURE — 2500000003 HC RX 250 WO HCPCS: Performed by: NURSE ANESTHETIST, CERTIFIED REGISTERED

## 2025-01-08 PROCEDURE — 7100000001 HC PACU RECOVERY - ADDTL 15 MIN: Performed by: ORTHOPAEDIC SURGERY

## 2025-01-08 PROCEDURE — 3700000001 HC ADD 15 MINUTES (ANESTHESIA): Performed by: ORTHOPAEDIC SURGERY

## 2025-01-08 PROCEDURE — 6360000002 HC RX W HCPCS: Performed by: NURSE ANESTHETIST, CERTIFIED REGISTERED

## 2025-01-08 PROCEDURE — 3700000000 HC ANESTHESIA ATTENDED CARE: Performed by: ORTHOPAEDIC SURGERY

## 2025-01-08 RX ORDER — LIDOCAINE HYDROCHLORIDE 10 MG/ML
1 INJECTION, SOLUTION EPIDURAL; INFILTRATION; INTRACAUDAL; PERINEURAL
Status: DISCONTINUED | OUTPATIENT
Start: 2025-01-08 | End: 2025-01-08 | Stop reason: HOSPADM

## 2025-01-08 RX ORDER — SODIUM CHLORIDE 0.9 % (FLUSH) 0.9 %
5-40 SYRINGE (ML) INJECTION PRN
Status: DISCONTINUED | OUTPATIENT
Start: 2025-01-08 | End: 2025-01-08 | Stop reason: HOSPADM

## 2025-01-08 RX ORDER — MIDAZOLAM HYDROCHLORIDE 1 MG/ML
INJECTION, SOLUTION INTRAMUSCULAR; INTRAVENOUS
Status: DISCONTINUED | OUTPATIENT
Start: 2025-01-08 | End: 2025-01-08 | Stop reason: SDUPTHER

## 2025-01-08 RX ORDER — DIPHENHYDRAMINE HYDROCHLORIDE 50 MG/ML
12.5 INJECTION INTRAMUSCULAR; INTRAVENOUS
Status: DISCONTINUED | OUTPATIENT
Start: 2025-01-08 | End: 2025-01-08 | Stop reason: HOSPADM

## 2025-01-08 RX ORDER — DEXAMETHASONE SODIUM PHOSPHATE 4 MG/ML
INJECTION, SOLUTION INTRA-ARTICULAR; INTRALESIONAL; INTRAMUSCULAR; INTRAVENOUS; SOFT TISSUE
Status: DISCONTINUED | OUTPATIENT
Start: 2025-01-08 | End: 2025-01-08 | Stop reason: SDUPTHER

## 2025-01-08 RX ORDER — ONDANSETRON 2 MG/ML
4 INJECTION INTRAMUSCULAR; INTRAVENOUS
Status: DISCONTINUED | OUTPATIENT
Start: 2025-01-08 | End: 2025-01-08 | Stop reason: HOSPADM

## 2025-01-08 RX ORDER — SODIUM CHLORIDE 9 MG/ML
INJECTION, SOLUTION INTRAVENOUS CONTINUOUS
Status: DISCONTINUED | OUTPATIENT
Start: 2025-01-08 | End: 2025-01-08 | Stop reason: HOSPADM

## 2025-01-08 RX ORDER — HYDROCODONE BITARTRATE AND ACETAMINOPHEN 5; 325 MG/1; MG/1
1 TABLET ORAL EVERY 4 HOURS PRN
Qty: 18 TABLET | Refills: 0 | Status: SHIPPED | OUTPATIENT
Start: 2025-01-08 | End: 2025-01-11

## 2025-01-08 RX ORDER — BUPIVACAINE HYDROCHLORIDE AND EPINEPHRINE 2.5; 5 MG/ML; UG/ML
INJECTION, SOLUTION EPIDURAL; INFILTRATION; INTRACAUDAL; PERINEURAL PRN
Status: DISCONTINUED | OUTPATIENT
Start: 2025-01-08 | End: 2025-01-08 | Stop reason: ALTCHOICE

## 2025-01-08 RX ORDER — SODIUM CHLORIDE 0.9 % (FLUSH) 0.9 %
5-40 SYRINGE (ML) INJECTION EVERY 12 HOURS SCHEDULED
Status: DISCONTINUED | OUTPATIENT
Start: 2025-01-08 | End: 2025-01-08 | Stop reason: HOSPADM

## 2025-01-08 RX ORDER — FENTANYL CITRATE 50 UG/ML
INJECTION, SOLUTION INTRAMUSCULAR; INTRAVENOUS
Status: DISCONTINUED | OUTPATIENT
Start: 2025-01-08 | End: 2025-01-08 | Stop reason: SDUPTHER

## 2025-01-08 RX ORDER — PROPOFOL 10 MG/ML
INJECTION, EMULSION INTRAVENOUS
Status: DISCONTINUED | OUTPATIENT
Start: 2025-01-08 | End: 2025-01-08 | Stop reason: SDUPTHER

## 2025-01-08 RX ORDER — ONDANSETRON 2 MG/ML
INJECTION INTRAMUSCULAR; INTRAVENOUS
Status: DISCONTINUED | OUTPATIENT
Start: 2025-01-08 | End: 2025-01-08 | Stop reason: SDUPTHER

## 2025-01-08 RX ORDER — GABAPENTIN 300 MG/1
300 CAPSULE ORAL ONCE
Status: COMPLETED | OUTPATIENT
Start: 2025-01-08 | End: 2025-01-08

## 2025-01-08 RX ORDER — FENTANYL CITRATE 0.05 MG/ML
50 INJECTION, SOLUTION INTRAMUSCULAR; INTRAVENOUS EVERY 5 MIN PRN
Status: DISCONTINUED | OUTPATIENT
Start: 2025-01-08 | End: 2025-01-08 | Stop reason: HOSPADM

## 2025-01-08 RX ORDER — SODIUM CHLORIDE 9 MG/ML
INJECTION, SOLUTION INTRAVENOUS PRN
Status: DISCONTINUED | OUTPATIENT
Start: 2025-01-08 | End: 2025-01-08 | Stop reason: HOSPADM

## 2025-01-08 RX ORDER — ACETAMINOPHEN 500 MG
1000 TABLET ORAL ONCE
Status: COMPLETED | OUTPATIENT
Start: 2025-01-08 | End: 2025-01-08

## 2025-01-08 RX ORDER — TRANEXAMIC ACID 100 MG/ML
INJECTION, SOLUTION INTRAVENOUS
Status: DISCONTINUED | OUTPATIENT
Start: 2025-01-08 | End: 2025-01-08 | Stop reason: SDUPTHER

## 2025-01-08 RX ADMIN — ACETAMINOPHEN 1000 MG: 500 TABLET, FILM COATED ORAL at 10:55

## 2025-01-08 RX ADMIN — ONDANSETRON 4 MG: 2 INJECTION, SOLUTION INTRAMUSCULAR; INTRAVENOUS at 12:50

## 2025-01-08 RX ADMIN — SODIUM CHLORIDE: 9 INJECTION, SOLUTION INTRAVENOUS at 12:45

## 2025-01-08 RX ADMIN — PROPOFOL 200 MG: 10 INJECTION, EMULSION INTRAVENOUS at 12:45

## 2025-01-08 RX ADMIN — DEXAMETHASONE SODIUM PHOSPHATE 8 MG: 4 INJECTION INTRA-ARTICULAR; INTRALESIONAL; INTRAMUSCULAR; INTRAVENOUS; SOFT TISSUE at 12:50

## 2025-01-08 RX ADMIN — PROPOFOL 100 MG: 10 INJECTION, EMULSION INTRAVENOUS at 12:49

## 2025-01-08 RX ADMIN — FENTANYL CITRATE 100 MCG: 50 INJECTION INTRAMUSCULAR; INTRAVENOUS at 12:45

## 2025-01-08 RX ADMIN — TRANEXAMIC ACID 1000 MG: 100 INJECTION, SOLUTION INTRAVENOUS at 12:50

## 2025-01-08 RX ADMIN — MIDAZOLAM 2 MG: 1 INJECTION INTRAMUSCULAR; INTRAVENOUS at 12:45

## 2025-01-08 RX ADMIN — GABAPENTIN 300 MG: 300 CAPSULE ORAL at 10:55

## 2025-01-08 ASSESSMENT — PAIN - FUNCTIONAL ASSESSMENT
PAIN_FUNCTIONAL_ASSESSMENT: 0-10
PAIN_FUNCTIONAL_ASSESSMENT: NONE - DENIES PAIN

## 2025-01-08 NOTE — ANESTHESIA PRE PROCEDURE
notes reviewed   no history of anesthetic complications:   Airway: Mallampati: III  TM distance: >3 FB   Neck ROM: full  Mouth opening: > = 3 FB   Dental:    (+) upper dentures and lower dentures      Pulmonary:Negative Pulmonary ROS and normal exam  breath sounds clear to auscultation            Patient did not smoke on day of surgery.                 Cardiovascular:    (+) hypertension:, hyperlipidemia      ECG reviewed  Rhythm: regular  Rate: normal           Beta Blocker:  Not on Beta Blocker         Neuro/Psych:   (+) neuromuscular disease:, psychiatric history:depression/anxiety              ROS comment: Sacral fracture s/p surgery      Peroneal nerve palsy GI/Hepatic/Renal: Neg GI/Hepatic/Renal ROS            Endo/Other:    (+) blood dyscrasia: anemia:., malignancy/cancer (H/O Cervical cancer s/p Chemo and Radiation).                  ROS comment: Marijuana (Weed); Times per week: 14 Abdominal:             Vascular:          Other Findings:         Anesthesia Plan      general     ASA 3       Induction: intravenous.    MIPS: Postoperative opioids intended and Prophylactic antiemetics administered.  Anesthetic plan and risks discussed with patient.      Plan discussed with CRNA.                  Estefanía John MD   1/8/2025

## 2025-01-08 NOTE — DISCHARGE INSTRUCTIONS
Dr. Juno French MD        POSTOPERATIVE INSTRUCTIONS      WOUND CARE  Discontinue dressing in 3-5 days no further dressing required  Ok to  shower with dressing on and continue when discontinued      MEDICATIONS  Use Tylenol and NSAIDs as first line treatment for pain  Please resume all home medications, unless instructed otherwise.    ACTIVITY  OK to ice and elevate the extremity as needed for 2-3 days  NO driving while taking narcotic medications or until instructed otherwise by physician.      EMERGENCIES  Contact Dr. French or his nurse at [phone] if any questions or concerns  **If you have an emergency that requires immediate attention, proceed to the nearest emergency room.    FOLLOW-UP CARE / QUESTIONS  Follow up as previously scheduled  If not previously scheduled call and follow in 2 weeks  Contact the office to confirm/schedule your post-op visits if needed.    DISCHARGE INSTRUCTIONS FOR GENERAL ANESTHESIA    In order to continue your care at home, please follow the instructions below.    Anesthesia - Do not drink any alcoholic beverages or make any legal or important decisions for 24 hours. If your surgery was on an extremity or abdomen, do not drive or operate any machinery until your surgeon approves, otherwise do not drive or operate any machinery for 24 hours or as restricted by your surgeon.     Pain Medications - Please do not drive, operate machinery, or drink alcoholic beverages while taking any prescribed pain medication.     Diet -  Start out eating lightly (broth, soup, crackers, toast, etc.) advancing as tolerated to your usual diet.  Try to avoid spicy and greasy/fatty foods for 24 hours. Drink plenty of fluids after surgery, unless you are on a fluid restriction.  Avoid milk/milk product for several hours.    Call your surgeon for the following:  You have pain that does not get better after you take pain medicine.   For an oral temperature

## 2025-01-08 NOTE — PROGRESS NOTES
Call back to room Dr. French working, made aware patient has scabbed area left outer ankle on side he is working. To go ahead and get patient ready except IV and he will be down to evaluate

## 2025-01-08 NOTE — INTERVAL H&P NOTE
Update History & Physical    The patient's History and Physical of December 30, 2024 was reviewed with the patient and I examined the patient. There was no change. Here today for PERONEAL NERVE RELEASE LEFT FOOT per Dr. French.     Pt has scabbed wound to left lateral ankle with minimal erythema, no drainage or bleeding. Pt AAO x 3 in NAD. HRRR. No adventitious lung sounds. No respiratory distress. NPO p MN. Took no medications this am. Stopped vitamins/supplements over one week ago. Denies taking any blood thinning medications. Denies recent or current chest pain/pressure, palpitations, SOB, recent URI, fever or chills. Review vitals per RN flowsheet.       Electronically signed by FRANK Wylie CNP on 1/8/2025 at 10:20 AM

## 2025-01-08 NOTE — BRIEF OP NOTE
Brief Postoperative Note      Patient: Leah Gonzalez  YOB: 1960  MRN: [id number]    Date of Procedure: 1/8/2025    Pre-Op Diagnosis Codes:      * Peroneal nerve palsy [G57.30]    Post-Op Diagnosis: Same       Procedure - peroneal nerve release    Surgeon(s):  Juno French MD    Assistant:  * No surgical staff found *    Anesthesia: General    Estimated Blood Loss (mL): less than 50     Complications: None    Specimens:   * No specimens in log *    Implants:  * No implants in log *      Drains: * No LDAs found *    Findings:  Infection Present At Time Of Surgery (PATOS) (choose all levels that have infection present):  No infection present  Other Findings: see dictation    Electronically signed by Juno French MD on 1/8/2025 at 1:09 PM

## 2025-01-08 NOTE — OP NOTE
[address] [place] [address]                            OPERATIVE REPORT      PATIENT NAME: MARSHA MACIAS               : 1960  MED REC NO: [id number]                          ROOM: [facility]  ACCOUNT NO: [id number]                       ADMIT DATE: 2025  PROVIDER: Juno French MD      DATE OF PROCEDURE:  2025    SURGEON:  Juno French MD    PREOPERATIVE DIAGNOSIS:  Left peroneal nerve palsy with very positive Tinel's around the fibular neck.    POSTOPERATIVE DIAGNOSIS:  Left peroneal nerve palsy with very positive Tinel's around the fibular neck.    PROCEDURE PERFORMED:  Peroneal nerve release.    ASSISTANT:  None.    ANESTHESIA:  General.    BLOOD LOSS:  Less than 50.    COMPLICATIONS:  None.    SPECIMEN:  None.    ANESTHESIA:  LMA general.    IMPLANTS:  None.    DRAINS:  None.    FINDINGS:  Peroneal nerve released across the fibular neck, roughly 4 cm distally and 4 cm proximally, ensuring fibrous bands had been released.    PROCEDURE IN DETAIL:  The patient was taken to operating room, placed under LMA general anesthesia, positioned in lateral decubitus position with the bean bag.    Left lower extremity was prepped and draped in usual sterile fashion.  Pneumatic tourniquet was elevated to 300 mmHg.    Lateral incision was made transversely longitudinally, angling from the anterolateral leg up towards the posterior quarter of the popliteal fossa.  Dissection now carried down to the fibular neck.  Deep fascia was released.  Peroneal nerve was identified.  Dissection was carried distally around the fibular neck palpably distally through the muscle.  A little bit of the proximal muscle was released and the deep fascia of the muscle to ensure that this was not further compressing the nerve.  The nerve was now traced proximally and released.  At this juncture, the nerve palpably was without significant constriction.

## 2025-01-08 NOTE — ANESTHESIA POSTPROCEDURE EVALUATION
Department of Anesthesiology  Postprocedure Note    Patient: Leah Gonzalez  MRN: [id number]  YOB: 1960  Date of evaluation: 1/8/2025    Procedure Summary       Date: 01/08/25 Room / Location: [address]    Anesthesia Start: 1243 Anesthesia Stop: 1337    Procedure: PERONEAL NERVE RELEASE LEFT FOOT (Left) Diagnosis:       Peroneal nerve palsy      (Peroneal nerve palsy [G57.30])    Surgeons: Juno French MD Responsible Provider: Estefanía John MD    Anesthesia Type: General ASA Status: 3            Anesthesia Type: General    Vaibhav Phase I: Vaibhav Score: 10    Vaibhav Phase II: Vaibhav Score: 10    Anesthesia Post Evaluation    Patient location during evaluation: PACU  Patient participation: complete - patient participated  Level of consciousness: awake and alert  Airway patency: patent  Nausea & Vomiting: no vomiting  Cardiovascular status: hemodynamically stable  Respiratory status: acceptable  Hydration status: euvolemic  Comments: POST- ANESTHESIA EVALUATION       Pt Name: Leah Gonzalez  MRN: [id number]  YOB: 1960  Date of evaluation: 1/8/2025  Time:  2:26 PM      /76   Pulse 89   Temp 96.9 °F (36.1 °C) (Temporal)   Resp 18   Ht 1.588 m (5' 2.5\")   Wt 93.4 kg (206 lb)   SpO2 99%   BMI 37.08 kg/m²      Consciousness Level  Awake  Cardiopulmonary Status  Stable  Pain Adequately Treated YES  Nausea / Vomiting  NO  Adequate Hydration  YES  Anesthesia Related Complications NONE      Electronically signed by Kang Daniels MD on 1/8/2025 at 2:26 PM         Pain management: satisfactory to patient    No notable events documented.

## 2025-01-16 DIAGNOSIS — M54.16 LUMBAR BACK PAIN WITH RADICULOPATHY AFFECTING LEFT LOWER EXTREMITY: ICD-10-CM

## 2025-01-16 NOTE — TELEPHONE ENCOUNTER
Gabapentin pended.  Last refill 10/31/24 to 1/19/25.  DOS 1/8/25 for left foot, peroneal nerve release

## 2025-01-17 RX ORDER — GABAPENTIN 300 MG/1
300 CAPSULE ORAL 3 TIMES DAILY
Qty: 90 CAPSULE | Refills: 2 | Status: SHIPPED | OUTPATIENT
Start: 2025-01-17 | End: 2025-04-17

## 2025-01-23 ENCOUNTER — OFFICE VISIT (OUTPATIENT)
Dept: ORTHOPEDIC SURGERY | Age: 65
End: 2025-01-23

## 2025-01-23 VITALS — RESPIRATION RATE: 16 BRPM | HEIGHT: 62 IN | BODY MASS INDEX: 37.91 KG/M2 | WEIGHT: 206 LBS

## 2025-01-23 DIAGNOSIS — G57.31 RIGHT PERONEAL NERVE PALSY: Primary | ICD-10-CM

## 2025-01-23 PROCEDURE — 99024 POSTOP FOLLOW-UP VISIT: CPT | Performed by: ORTHOPAEDIC SURGERY

## 2025-01-23 NOTE — PROGRESS NOTES
Patient ID: Leah Gonzalez is a 64 y.o. female    Chief Compliant:  Chief Complaint   Patient presents with    Ankle Pain        Diagnostic imaging:        Assessment and Plan:  1. Right peroneal nerve palsy        Very good outcome following peroneal nerve release pain gone foot drop better still little bit of lateral calf pain    Follow up 8w    HPI:  This is a 64 y.o. female who presents to the clinic today for 2-week postop peroneal nerve release.         Review of Systems   All other systems reviewed and are negative.      Past History:    Current Outpatient Medications:     gabapentin (NEURONTIN) 300 MG capsule, Take 1 capsule by mouth 3 times daily for 90 days., Disp: 90 capsule, Rfl: 2    rosuvastatin (CRESTOR) 5 MG tablet, Take 1 tablet by mouth nightly, Disp: 90 tablet, Rfl: 3    alendronate (FOSAMAX) 70 MG tablet, TAKE 1 TABLET BY MOUTH WEEKLY  WITH 8 OZ OF PLAIN WATER 30  MINUTES BEFORE FIRST FOOD, DRINK OR MEDS. STAY UPRIGHT FOR 30  MINS, Disp: 12 tablet, Rfl: 3    calcium carbonate 600 MG TABS tablet, Take 1 tablet by mouth 2 times daily, Disp: , Rfl:     BLACK CURRANT SEED OIL PO, Take by mouth 1 teaspoon daily, Disp: , Rfl:     Multiple Vitamins-Minerals (MULTIVITAMIN ADULTS 50+) TABS, Take by mouth, Disp: , Rfl:   Allergies   Allergen Reactions    Atorvastatin Other (See Comments)     Causes Muscle cramps     Social History     Socioeconomic History    Marital status:      Spouse name: Not on file    Number of children: Not on file    Years of education: Not on file    Highest education level: Not on file   Occupational History    Not on file   Tobacco Use    Smoking status: Former     Current packs/day: 1.00     Average packs/day: 1 pack/day for 49.7 years (49.7 ttl pk-yrs)     Types: Cigarettes     Start date: 7/12/1976    Smokeless tobacco: Never    Tobacco comments:     Quit 2017   Vaping Use    Vaping status: Never Used   Substance and Sexual Activity    Alcohol use: Yes     Alcohol/week:

## 2025-01-29 PROBLEM — Z01.818 PREOP EXAMINATION: Status: RESOLVED | Noted: 2024-12-30 | Resolved: 2025-01-29

## 2025-02-01 SDOH — ECONOMIC STABILITY: FOOD INSECURITY: WITHIN THE PAST 12 MONTHS, THE FOOD YOU BOUGHT JUST DIDN'T LAST AND YOU DIDN'T HAVE MONEY TO GET MORE.: NEVER TRUE

## 2025-02-01 SDOH — ECONOMIC STABILITY: TRANSPORTATION INSECURITY
IN THE PAST 12 MONTHS, HAS THE LACK OF TRANSPORTATION KEPT YOU FROM MEDICAL APPOINTMENTS OR FROM GETTING MEDICATIONS?: NO

## 2025-02-01 SDOH — ECONOMIC STABILITY: INCOME INSECURITY: IN THE LAST 12 MONTHS, WAS THERE A TIME WHEN YOU WERE NOT ABLE TO PAY THE MORTGAGE OR RENT ON TIME?: NO

## 2025-02-01 SDOH — ECONOMIC STABILITY: FOOD INSECURITY: WITHIN THE PAST 12 MONTHS, YOU WORRIED THAT YOUR FOOD WOULD RUN OUT BEFORE YOU GOT MONEY TO BUY MORE.: NEVER TRUE

## 2025-02-01 SDOH — ECONOMIC STABILITY: TRANSPORTATION INSECURITY
IN THE PAST 12 MONTHS, HAS LACK OF TRANSPORTATION KEPT YOU FROM MEETINGS, WORK, OR FROM GETTING THINGS NEEDED FOR DAILY LIVING?: NO

## 2025-02-01 ASSESSMENT — PATIENT HEALTH QUESTIONNAIRE - PHQ9
SUM OF ALL RESPONSES TO PHQ QUESTIONS 1-9: 0
2. FEELING DOWN, DEPRESSED OR HOPELESS: NOT AT ALL
SUM OF ALL RESPONSES TO PHQ QUESTIONS 1-9: 0
SUM OF ALL RESPONSES TO PHQ9 QUESTIONS 1 & 2: 0
SUM OF ALL RESPONSES TO PHQ QUESTIONS 1-9: 0
1. LITTLE INTEREST OR PLEASURE IN DOING THINGS: NOT AT ALL
1. LITTLE INTEREST OR PLEASURE IN DOING THINGS: NOT AT ALL
SUM OF ALL RESPONSES TO PHQ QUESTIONS 1-9: 0
SUM OF ALL RESPONSES TO PHQ9 QUESTIONS 1 & 2: 0
2. FEELING DOWN, DEPRESSED OR HOPELESS: NOT AT ALL

## 2025-02-04 ENCOUNTER — OFFICE VISIT (OUTPATIENT)
Dept: FAMILY MEDICINE CLINIC | Age: 65
End: 2025-02-04
Payer: COMMERCIAL

## 2025-02-04 VITALS
TEMPERATURE: 97.5 F | DIASTOLIC BLOOD PRESSURE: 74 MMHG | SYSTOLIC BLOOD PRESSURE: 118 MMHG | WEIGHT: 207 LBS | RESPIRATION RATE: 16 BRPM | BODY MASS INDEX: 37.86 KG/M2 | HEART RATE: 94 BPM | OXYGEN SATURATION: 97 %

## 2025-02-04 DIAGNOSIS — I10 ESSENTIAL HYPERTENSION: ICD-10-CM

## 2025-02-04 DIAGNOSIS — F41.0 PANIC DISORDER: Primary | ICD-10-CM

## 2025-02-04 DIAGNOSIS — R73.01 IFG (IMPAIRED FASTING GLUCOSE): ICD-10-CM

## 2025-02-04 DIAGNOSIS — M84.48XS SACRAL INSUFFICIENCY FRACTURE, SEQUELA: ICD-10-CM

## 2025-02-04 DIAGNOSIS — E78.2 MIXED HYPERLIPIDEMIA: ICD-10-CM

## 2025-02-04 DIAGNOSIS — M25.552 LEFT HIP PAIN: ICD-10-CM

## 2025-02-04 DIAGNOSIS — L30.9 DERMATITIS: ICD-10-CM

## 2025-02-04 DIAGNOSIS — C53.8 MALIGNANT NEOPLASM OF OVERLAPPING SITES OF CERVIX UTERI (HCC): ICD-10-CM

## 2025-02-04 PROCEDURE — 3078F DIAST BP <80 MM HG: CPT | Performed by: NURSE PRACTITIONER

## 2025-02-04 PROCEDURE — 3074F SYST BP LT 130 MM HG: CPT | Performed by: NURSE PRACTITIONER

## 2025-02-04 PROCEDURE — 99214 OFFICE O/P EST MOD 30 MIN: CPT | Performed by: NURSE PRACTITIONER

## 2025-02-04 RX ORDER — NYSTATIN AND TRIAMCINOLONE ACETONIDE 100000; 1 [USP'U]/G; MG/G
CREAM TOPICAL
Qty: 60 G | Refills: 1 | Status: SHIPPED | OUTPATIENT
Start: 2025-02-04

## 2025-02-04 RX ORDER — ALPRAZOLAM 0.5 MG
0.5 TABLET ORAL NIGHTLY PRN
Qty: 30 TABLET | Refills: 0 | Status: SHIPPED | OUTPATIENT
Start: 2025-02-04 | End: 2025-03-06

## 2025-02-04 ASSESSMENT — ENCOUNTER SYMPTOMS
SHORTNESS OF BREATH: 0
CONSTIPATION: 0
DIARRHEA: 0
ABDOMINAL DISTENTION: 0
BACK PAIN: 0
NAUSEA: 0
SORE THROAT: 0
RHINORRHEA: 0
COLOR CHANGE: 0
COUGH: 0
CHEST TIGHTNESS: 0
ABDOMINAL PAIN: 0

## 2025-02-04 NOTE — PROGRESS NOTES
Patricia Quinn, APRN-CNP  [facility]  [facility] MEDICINE  [address], [address]  [facility] [address]  Dept: [phone]  Dept Fax: [phone]     Patient ID: Leah Gonzalez is a 64 y.o. female Established patient    HPI    Pt here today for f/u on chronic medical problems,go over labs and/or diagnostic studies, and medication refills. Pt denies any fever or chills.  Pt today denies any HA, chest pain, or SOB.  Pt denies any N/V/D/C or abdominal pain.    Rash to left ankle that started with a small spot about 2 months ago and now has gotten bigger. She bought a spray online and that seemed to make it worse. It itches every once in a while but not bad and denies any tingling or burning.     Xanax is still working and keeping her panic attacks controlled. She is more anxious right now due to son is moving out to live with his girlfriend and she is having to move out of her house that she has lived in for 25 years due to landlord passed away and the dtr wants to sell the home.     Otherwise pt doing well on current tx and no other concerns today.     The patient's past medical, surgical, social, and family history as well as his current medications and allergies were reviewed as documented in today's encounter by RAJINDER Gilliam.      Previous office notes, labs, imaging and hospital records were reviewed prior to and during encounter.    Current Outpatient Medications on File Prior to Visit   Medication Sig Dispense Refill    gabapentin (NEURONTIN) 300 MG capsule Take 1 capsule by mouth 3 times daily for 90 days. 90 capsule 2    rosuvastatin (CRESTOR) 5 MG tablet Take 1 tablet by mouth nightly 90 tablet 3    alendronate (FOSAMAX) 70 MG tablet TAKE 1 TABLET BY MOUTH WEEKLY  WITH 8 OZ OF PLAIN WATER 30  MINUTES BEFORE FIRST FOOD, DRINK OR MEDS. STAY UPRIGHT FOR 30  MINS 12 tablet 3    calcium carbonate 600 MG TABS tablet Take 1 tablet by mouth 2 times daily      BLACK

## 2025-02-04 NOTE — PATIENT INSTRUCTIONS
- Get labs done a few days before next appointment. You will want to be fasting which means nothing to eat or drink for 12 hours before completing labs.

## 2025-02-13 ENCOUNTER — OFFICE VISIT (OUTPATIENT)
Dept: GYNECOLOGIC ONCOLOGY | Age: 65
End: 2025-02-13
Payer: COMMERCIAL

## 2025-02-13 VITALS
SYSTOLIC BLOOD PRESSURE: 154 MMHG | OXYGEN SATURATION: 97 % | HEART RATE: 111 BPM | TEMPERATURE: 97.2 F | BODY MASS INDEX: 38.01 KG/M2 | WEIGHT: 207.8 LBS | DIASTOLIC BLOOD PRESSURE: 96 MMHG

## 2025-02-13 DIAGNOSIS — Z92.3 HISTORY OF RADIATION THERAPY: ICD-10-CM

## 2025-02-13 DIAGNOSIS — Z92.21 HISTORY OF CHEMOTHERAPY: ICD-10-CM

## 2025-02-13 DIAGNOSIS — C53.8 MALIGNANT NEOPLASM OF OVERLAPPING SITES OF CERVIX UTERI (HCC): Primary | ICD-10-CM

## 2025-02-13 DIAGNOSIS — E83.42 HYPOMAGNESEMIA: ICD-10-CM

## 2025-02-13 PROCEDURE — 3080F DIAST BP >= 90 MM HG: CPT | Performed by: OBSTETRICS & GYNECOLOGY

## 2025-02-13 PROCEDURE — 3077F SYST BP >= 140 MM HG: CPT | Performed by: OBSTETRICS & GYNECOLOGY

## 2025-02-13 PROCEDURE — 99215 OFFICE O/P EST HI 40 MIN: CPT | Performed by: OBSTETRICS & GYNECOLOGY

## 2025-02-13 NOTE — PROGRESS NOTES
[facility] Gynecologic Oncology  [address] [place], [address]  [place] [address]    GYNECOLOGIC ONCOLOGY    PATIENT NAME: Leah Gonzalez  MRN: [id number]  DATE: 02/13/2025       TREATMENT SUMMARY:  THESE RECORDS HAVE BEEN REVIEWED, UNLESS OTHERWISE INDICATED     Stage IIIC1r, grade 1, Squamous Cell carcinoma of the CERVIX (diagnosed 2023)   The patient previously experienced symptoms of post-menopausal bleeding, pain in her right labia, and bleeding with vaginal dilator.   Reports resolution of symptoms today.     DIAGNOSIS ESTABLISHED:   Pap test (07/27/16): ASCUS. Endocervix component present. Shift in yakov suggestive of bacterial vaginosis.HPV negative  Pap test (10/26/21): LSIL and ASCUS-H. Endocervix component present. Shift in yakov suggestive of bacterial vaginosis. HPV negative  Cervix biopsy (05/02/23): Grade 1, Squamous cell carcinoma and abundant necrosis    DIAGNOSTIC STUDIES:  IMAGING EVALUATION:  PET CT Skull Base to Mid Thigh 01/02/25  Calcified uterine fibroid.    Chronic osseous findings within the pelvis.    No metabolically active disease.       DEFINITIVE TREATMENT:   5 cycles 40mg/m2 Cisplatin (C1: 06/14/23. C5: 07/12/23), under the care of Dr. Michael Joyner and concurrent with 4500cGy EBRT and 1000cGy LN boost over 25 fractions and 35 days, under the care of Dr. Azul Sepulveda (START DATE: 06/14/23. COMPLETION DATE: 07/19/23)  Baldomero Sleeve placed (07/20/23), by Dr. Mary Roach  Followed by 2850cGy of Tandem and Ovoid placement, over 14 days (F1: 07/25/23. F5: 08/08/23)    HEALTH MAINTENANCE:     See TREATMENT SUMMARY for details of pap test history    Mammogram (11/08/24): BR2. Records have been reviewed , no evidence of malignancy seen. Next due November 2025    Colorectal Cancer Screening, with Colonoscopy (11/17/23) by Dr. Juno Alicia: Segmental areas of mildly congested and vascular pattern decreased mucosa in the rectosigmoid colon. Diverticulosis in the sigmoid colon. 
Review of Systems   Constitutional:  Negative for appetite change, chills, diaphoresis, fatigue, fever and unexpected weight change.   HENT:   Negative for hearing loss, lump/mass, mouth sores, nosebleeds, sore throat, tinnitus, trouble swallowing and voice change.    Eyes:  Negative for eye problems and icterus.   Respiratory:  Negative for chest tightness, cough, hemoptysis, shortness of breath and wheezing.    Cardiovascular:  Negative for chest pain, leg swelling and palpitations.   Gastrointestinal:  Negative for abdominal distention, abdominal pain, blood in stool, constipation, diarrhea, nausea, rectal pain and vomiting.   Endocrine: Negative for hot flashes.   Genitourinary:  Negative for bladder incontinence, difficulty urinating, dyspareunia, dysuria, frequency, hematuria, menstrual problem, nocturia, pelvic pain, vaginal bleeding and vaginal discharge.    Musculoskeletal:  Negative for arthralgias, back pain, flank pain, gait problem, myalgias, neck pain and neck stiffness.   Skin:  Positive for rash (left ankle / foot) and wound (Healing LLE incision: nerve release to left calf). Negative for itching.   Neurological:  Negative for dizziness, extremity weakness, gait problem, headaches, light-headedness, numbness, seizures and speech difficulty.   Hematological:  Negative for adenopathy. Bruises/bleeds easily (bruise easily).   Psychiatric/Behavioral:  Negative for confusion, decreased concentration, depression, sleep disturbance and suicidal ideas. The patient is nervous/anxious.        
stores with return visit     Telephone note, JOSE DE JESUS Patterson (05/08/23 to 08/17/23) [Gynecologic Oncology]  Ann-Marie Bolivar: Pt called stating that since biopsy on 05/02/23 she is experiencing heavier vaginal bleeding - saturating 3 pads at night (8 hour period) with thick clotting and increased fatigue. Pt does state that the bleeding is heavier at night.Pt denies being lightheaded and no abdominal pain. Please advise pt  Returned call to discuss patient symptoms.  Patient had labs drawn today and we will add on H&H given her fatigue to ensure stability. Reviewed CBC from 05/02/23 Hbg 13.1. We reviewed expected symptomology of bleeding and when to notify office if heavy bleeding occurs. We then reviewed her biopsy positive for squamous cell carcinoma of the cervix. I placed a referral to radiation oncology to schedule NP appointment next week. She is understandable PET/CT scan on 05/12/23 and appointment to follow with Dr. Roach to discuss definitive care plan. Patient was understanding of diagnosis and care plan.  Visit note on 08/17/23: Normal appearing external genitalia. Medium speculum placed in vaginal canal reveals no blood or abnormal discharge. Visualization of normal appearing anterior cervix. The posterior cervix is not fully visualized however without appearance of lesions. White fibrotic appearing mucosal changes noted to L posterior cervicovaginal junction. No evidence of necrosis. Bimanual examination reveals a smooth vaginal canal and cervicovaginal mucosa. The cervix is palpable and appears smooth, without firmness, nodules or tenderness. No blood on examination     Telephone note, Dr. Mandi Maurer (05/05/23)  Blood Glucose 138 (H), Calcium 11.0 (H), Sodium 147 (H), CO2 15 (L), Anion Gap 28 (H) and AST 38 (H)  Please add A1C, PTH, and Vitamin D levels to further evaluate and let her know                Visit note, Dr. Mandi Maurer (07/27/16 to 05/01/23) [Family Medicine]                  
MD.  Electronically Signed: NORMA SANTAMARIA. 02/13/25 1:20 PM     I, Mary Roach MD, personally performed the services described in this documentation as scribed, in my presence, and it is both accurate and complete..      Mary Roach MD  Gynecologic Oncology

## 2025-03-20 ENCOUNTER — OFFICE VISIT (OUTPATIENT)
Dept: ORTHOPEDIC SURGERY | Age: 65
End: 2025-03-20

## 2025-03-20 VITALS — WEIGHT: 207 LBS | BODY MASS INDEX: 38.09 KG/M2 | HEIGHT: 62 IN | RESPIRATION RATE: 18 BRPM

## 2025-03-20 DIAGNOSIS — G57.32 LEFT PERONEAL NERVE PALSY: Primary | ICD-10-CM

## 2025-03-20 PROCEDURE — 99024 POSTOP FOLLOW-UP VISIT: CPT | Performed by: ORTHOPAEDIC SURGERY

## 2025-03-20 NOTE — PROGRESS NOTES
11/04/2020    Left foot drop     Panic disorder      Past Surgical History:   Procedure Laterality Date    CERVIX BIOPSY N/A 07/20/2023    EXAMINATION UNDER ANESTHESIA, CERVICAL DILATION, PLACEMENT OF PAULIE SLEEVE, ULTRASOUND GUIDANCE performed by Mary Roach MD at [facility] OR    CHOLECYSTECTOMY      DENTAL SURGERY      teeth pulled due to gum disease    FOOT SURGERY Left 1/8/2025    PERONEAL NERVE RELEASE LEFT FOOT performed by Juno French MD at [facility] OR    IR PORT PLACEMENT > 5 YEARS  05/31/2023    IR PORT PLACEMENT EQUAL OR GREATER THAN 5 YEARS 5/31/2023 Keo Koenig MD [facility] SPECIAL PROCEDURES / Removed 9/2023    PORT SURGERY      removed    SPINE SURGERY N/A 01/11/2024    KYPHOPLASTY LUMBAR SACRAL VERTEBROPLASTY L5-S1 performed by Juno French MD at [facility] OR    TUBAL LIGATION      VAGINA EXAMINATION UNDER ANESTHESIA  07/20/2023    EXAMINATION UNDER ANESTHESIA, CERVICAL DILATION, PLACEMENT OF PAULIE SLEEVE, ULTRASOUND GUIDANCE (Cervix     Family History   Problem Relation Age of Onset    High Blood Pressure Father     COPD Father     High Cholesterol Father     Cataracts Father     High Blood Pressure Sister     High Cholesterol Sister     Thyroid Disease Sister     Anxiety Disorder Sister     Emphysema Brother     Heart Failure Brother         Physical Exam:  Vitals signs and nursing note reviewed.   Constitutional:       Appearance: well-developed.   HENT:      Head: Normocephalic and atraumatic.      Nose: Nose normal.   Eyes:      Conjunctiva/sclera: Conjunctivae normal.   Neck:      Musculoskeletal: Normal range of motion and neck supple.   Pulmonary:      Effort: Pulmonary effort is normal. No respiratory distress.   Musculoskeletal:      Comments: Normal gait     Skin:     General: Skin is warm and dry.   Neurological:      Mental Status: Alert and oriented to person, place, and time.      Sensory: No sensory deficit.   Psychiatric:         Behavior: Behavior normal.         Thought Content:

## 2025-04-07 ENCOUNTER — TELEPHONE (OUTPATIENT)
Dept: FAMILY MEDICINE CLINIC | Age: 65
End: 2025-04-07

## 2025-04-07 NOTE — TELEPHONE ENCOUNTER
Patient called in stating that she was seen by PCP back on 02.04.2025 for rash on her left ankle.  Patient states that she still has 2 spots on her left ankle, 2 spots on her right ankle, and now it spread to her right leg.    Patient is asking if there is a different cream that PCP would recommend and if she is wanting to see her back in office.  Please advise.

## 2025-04-29 ENCOUNTER — HOSPITAL ENCOUNTER (OUTPATIENT)
Age: 65
Discharge: HOME | End: 2025-04-29
Payer: COMMERCIAL

## 2025-04-29 ENCOUNTER — RESULTS FOLLOW-UP (OUTPATIENT)
Dept: FAMILY MEDICINE CLINIC | Age: 65
End: 2025-04-29

## 2025-04-29 DIAGNOSIS — E83.42 HYPOMAGNESEMIA: ICD-10-CM

## 2025-04-29 DIAGNOSIS — I10 ESSENTIAL HYPERTENSION: ICD-10-CM

## 2025-04-29 DIAGNOSIS — F41.0 PANIC DISORDER: ICD-10-CM

## 2025-04-29 DIAGNOSIS — R73.01 IFG (IMPAIRED FASTING GLUCOSE): ICD-10-CM

## 2025-04-29 DIAGNOSIS — E78.2 MIXED HYPERLIPIDEMIA: ICD-10-CM

## 2025-04-29 LAB
ALBUMIN SERPL-MCNC: 4.3 G/DL (ref 3.5–5.2)
ALBUMIN/GLOB SERPL: 1.4 {RATIO} (ref 1–2.5)
ALP SERPL-CCNC: 78 U/L (ref 35–104)
ALT SERPL-CCNC: 16 U/L (ref 10–35)
ANION GAP SERPL CALCULATED.3IONS-SCNC: 13 MMOL/L (ref 9–16)
AST SERPL-CCNC: 24 U/L (ref 10–35)
BILIRUB SERPL-MCNC: 0.4 MG/DL (ref 0–1.2)
BUN SERPL-MCNC: 9 MG/DL (ref 8–23)
CALCIUM SERPL-MCNC: 9.2 MG/DL (ref 8.6–10.4)
CHLORIDE SERPL-SCNC: 102 MMOL/L (ref 98–107)
CHOLEST SERPL-MCNC: 168 MG/DL (ref 0–199)
CHOLESTEROL/HDL RATIO: 3.1
CO2 SERPL-SCNC: 24 MMOL/L (ref 20–31)
CREAT SERPL-MCNC: 0.8 MG/DL (ref 0.6–0.9)
ERYTHROCYTE [DISTWIDTH] IN BLOOD BY AUTOMATED COUNT: 14 % (ref 11.8–14.4)
EST. AVERAGE GLUCOSE BLD GHB EST-MCNC: 123 MG/DL
GFR, ESTIMATED: 83 ML/MIN/1.73M2
GLUCOSE SERPL-MCNC: 143 MG/DL (ref 74–99)
HBA1C MFR BLD: 5.9 % (ref 4–6)
HCT VFR BLD AUTO: 38.1 % (ref 36.3–47.1)
HDLC SERPL-MCNC: 54 MG/DL
HGB BLD-MCNC: 12.6 G/DL (ref 11.9–15.1)
LDLC SERPL CALC-MCNC: 81 MG/DL (ref 0–100)
MAGNESIUM SERPL-MCNC: 1.6 MG/DL (ref 1.6–2.4)
MCH RBC QN AUTO: 29 PG (ref 25.2–33.5)
MCHC RBC AUTO-ENTMCNC: 33.1 G/DL (ref 28.4–34.8)
MCV RBC AUTO: 87.8 FL (ref 82.6–102.9)
NRBC BLD-RTO: 0 PER 100 WBC
PLATELET # BLD AUTO: 174 K/UL (ref 138–453)
PMV BLD AUTO: 11.5 FL (ref 8.1–13.5)
POTASSIUM SERPL-SCNC: 4 MMOL/L (ref 3.7–5.3)
PROT SERPL-MCNC: 7.4 G/DL (ref 6.6–8.7)
RBC # BLD AUTO: 4.34 M/UL (ref 3.95–5.11)
SODIUM SERPL-SCNC: 139 MMOL/L (ref 136–145)
TRIGL SERPL-MCNC: 167 MG/DL
TSH SERPL DL<=0.05 MIU/L-ACNC: 2.72 UIU/ML (ref 0.27–4.2)
VLDLC SERPL CALC-MCNC: 33 MG/DL (ref 1–30)
WBC OTHER # BLD: 5.6 K/UL (ref 3.5–11.3)

## 2025-04-29 PROCEDURE — 80053 COMPREHEN METABOLIC PANEL: CPT

## 2025-04-29 PROCEDURE — 85027 COMPLETE CBC AUTOMATED: CPT

## 2025-04-29 PROCEDURE — 36415 COLL VENOUS BLD VENIPUNCTURE: CPT

## 2025-04-29 PROCEDURE — 83036 HEMOGLOBIN GLYCOSYLATED A1C: CPT

## 2025-04-29 PROCEDURE — 80061 LIPID PANEL: CPT

## 2025-04-29 PROCEDURE — 84443 ASSAY THYROID STIM HORMONE: CPT

## 2025-04-29 PROCEDURE — 83735 ASSAY OF MAGNESIUM: CPT

## 2025-05-09 ENCOUNTER — OFFICE VISIT (OUTPATIENT)
Dept: FAMILY MEDICINE CLINIC | Age: 65
End: 2025-05-09

## 2025-05-09 ENCOUNTER — TELEPHONE (OUTPATIENT)
Dept: FAMILY MEDICINE CLINIC | Age: 65
End: 2025-05-09

## 2025-05-09 VITALS
RESPIRATION RATE: 16 BRPM | WEIGHT: 209 LBS | SYSTOLIC BLOOD PRESSURE: 134 MMHG | OXYGEN SATURATION: 96 % | TEMPERATURE: 97.4 F | DIASTOLIC BLOOD PRESSURE: 86 MMHG | BODY MASS INDEX: 38.23 KG/M2 | HEART RATE: 96 BPM

## 2025-05-09 DIAGNOSIS — I10 ESSENTIAL HYPERTENSION: ICD-10-CM

## 2025-05-09 DIAGNOSIS — R73.01 IFG (IMPAIRED FASTING GLUCOSE): ICD-10-CM

## 2025-05-09 DIAGNOSIS — E78.2 MIXED HYPERLIPIDEMIA: ICD-10-CM

## 2025-05-09 DIAGNOSIS — C53.8 MALIGNANT NEOPLASM OF OVERLAPPING SITES OF CERVIX UTERI (HCC): ICD-10-CM

## 2025-05-09 DIAGNOSIS — L30.9 DERMATITIS: Primary | ICD-10-CM

## 2025-05-09 DIAGNOSIS — F41.0 PANIC DISORDER: ICD-10-CM

## 2025-05-09 RX ORDER — TRIAMCINOLONE ACETONIDE 40 MG/ML
60 INJECTION, SUSPENSION INTRA-ARTICULAR; INTRAMUSCULAR ONCE
Status: COMPLETED | OUTPATIENT
Start: 2025-05-09 | End: 2025-05-09

## 2025-05-09 RX ORDER — CEPHALEXIN 500 MG/1
500 CAPSULE ORAL 3 TIMES DAILY
Qty: 21 CAPSULE | Refills: 0 | Status: SHIPPED | OUTPATIENT
Start: 2025-05-09 | End: 2025-05-16

## 2025-05-09 RX ADMIN — TRIAMCINOLONE ACETONIDE 60 MG: 40 INJECTION, SUSPENSION INTRA-ARTICULAR; INTRAMUSCULAR at 14:14

## 2025-05-09 ASSESSMENT — ENCOUNTER SYMPTOMS
CHEST TIGHTNESS: 0
SHORTNESS OF BREATH: 0
ABDOMINAL DISTENTION: 0
SORE THROAT: 0
DIARRHEA: 0
BACK PAIN: 0
COLOR CHANGE: 0
RHINORRHEA: 0
NAUSEA: 0
COUGH: 0
ABDOMINAL PAIN: 0
CONSTIPATION: 0

## 2025-05-09 NOTE — PROGRESS NOTES
Patricia Quinn, APRN-CNP  [facility]  [facility] MEDICINE  [address], [address]  [place] [address]  Dept: [phone]  Dept Fax: [phone]     Patient ID: Leah Gonzalez is a 64 y.o. female Established patient    HPI    Presents for regular check-up and review of labs. Today, patient complains of  rash to bilateral legs that is spreading.  Pt denies any fever or chills.  Pt today denies any HA, chest pain, or SOB.  Pt denies any N/V/D/C or abdominal pain.    Preventative care, female:  Last PAP: 10/26/2021 sees dr. Roach   Last mammogram: 11/8/2024  Last colonoscopy: 11/28/2023  Nicotine use: former  Alcohol use: yes   Drug use: yes marijuana   Last dental appointment: a couple months has dentures   Last optometry appointment: 2 years ago     Xanax is still working and keeping her panic attacks controlled.    Otherwise pt doing well on current tx and no other concerns today.     The patient's past medical, surgical, social, and family history as well as his current medications and allergies were reviewed as documented in today's encounter by RAJINDER Gilliam.      Previous office notes, labs, imaging and hospital records were reviewed prior to and during encounter.    Current Outpatient Medications on File Prior to Visit   Medication Sig Dispense Refill    rosuvastatin (CRESTOR) 5 MG tablet Take 1 tablet by mouth nightly 90 tablet 3    alendronate (FOSAMAX) 70 MG tablet TAKE 1 TABLET BY MOUTH WEEKLY  WITH 8 OZ OF PLAIN WATER 30  MINUTES BEFORE FIRST FOOD, DRINK OR MEDS. STAY UPRIGHT FOR 30  MINS 12 tablet 3    calcium carbonate 600 MG TABS tablet Take 1 tablet by mouth 2 times daily      BLACK CURRANT SEED OIL PO Take by mouth 1 teaspoon daily      Multiple Vitamins-Minerals (MULTIVITAMIN ADULTS 50+) TABS Take by mouth      nystatin-triamcinolone (MYCOLOG II) 701991-6.1 UNIT/GM-% cream Apply topically 2 times daily. (Patient not taking: Reported on 5/9/2025) 60 g 1

## 2025-05-09 NOTE — TELEPHONE ENCOUNTER
----- Message from FRANK Cortes CNP sent at 5/9/2025  3:15 PM EDT -----  Tried calling pt to let her know I am starting her on antibiotic as well, the rash looks like it could be a staph/ strep infection. Have her take it 3 times a day. Please call around [phone] to let her know. she doesn't have a cell phone just house phone

## 2025-05-12 ENCOUNTER — TELEPHONE (OUTPATIENT)
Age: 65
End: 2025-05-12

## 2025-05-12 NOTE — TELEPHONE ENCOUNTER
Writer attempted to contact patient to get her scheduled for a 1 yr f/u with  left a voicemail and phone number for a return call back

## 2025-05-20 ENCOUNTER — HOSPITAL ENCOUNTER (OUTPATIENT)
Age: 65
Discharge: HOME | End: 2025-05-20
Payer: COMMERCIAL

## 2025-05-20 LAB — T PALLIDUM AB SER QL IA: NONREACTIVE

## 2025-05-20 PROCEDURE — 86780 TREPONEMA PALLIDUM: CPT

## 2025-05-20 PROCEDURE — 36415 COLL VENOUS BLD VENIPUNCTURE: CPT

## 2025-06-18 ENCOUNTER — OFFICE VISIT (OUTPATIENT)
Dept: GYNECOLOGIC ONCOLOGY | Age: 65
End: 2025-06-18
Payer: COMMERCIAL

## 2025-06-18 VITALS
SYSTOLIC BLOOD PRESSURE: 164 MMHG | TEMPERATURE: 98.2 F | HEART RATE: 92 BPM | DIASTOLIC BLOOD PRESSURE: 85 MMHG | OXYGEN SATURATION: 94 % | BODY MASS INDEX: 38.56 KG/M2 | WEIGHT: 210.8 LBS

## 2025-06-18 DIAGNOSIS — Z92.3 HISTORY OF RADIATION THERAPY: ICD-10-CM

## 2025-06-18 DIAGNOSIS — C53.8 MALIGNANT NEOPLASM OF OVERLAPPING SITES OF CERVIX UTERI (HCC): Primary | ICD-10-CM

## 2025-06-18 DIAGNOSIS — Z92.21 HISTORY OF CHEMOTHERAPY: ICD-10-CM

## 2025-06-18 DIAGNOSIS — E83.42 HYPOMAGNESEMIA: ICD-10-CM

## 2025-06-18 PROCEDURE — 3077F SYST BP >= 140 MM HG: CPT | Performed by: OBSTETRICS & GYNECOLOGY

## 2025-06-18 PROCEDURE — 3079F DIAST BP 80-89 MM HG: CPT | Performed by: OBSTETRICS & GYNECOLOGY

## 2025-06-18 PROCEDURE — 99215 OFFICE O/P EST HI 40 MIN: CPT | Performed by: OBSTETRICS & GYNECOLOGY

## 2025-06-18 NOTE — PROGRESS NOTES
Review of Systems   Constitutional:  Negative for appetite change, chills, diaphoresis, fatigue, fever and unexpected weight change.   HENT:   Negative for hearing loss, lump/mass, mouth sores, nosebleeds, sore throat, tinnitus, trouble swallowing and voice change.    Eyes:  Negative for eye problems and icterus.   Respiratory:  Negative for chest tightness, cough, hemoptysis, shortness of breath and wheezing.    Cardiovascular:  Negative for chest pain, leg swelling and palpitations.   Gastrointestinal:  Negative for abdominal distention, abdominal pain, blood in stool, constipation, diarrhea, nausea, rectal pain and vomiting.   Endocrine: Negative for hot flashes.   Genitourinary:  Negative for bladder incontinence, difficulty urinating, dyspareunia, dysuria, frequency, hematuria, menstrual problem, nocturia, pelvic pain, vaginal bleeding and vaginal discharge.    Musculoskeletal:  Negative for arthralgias, back pain, flank pain, gait problem, myalgias, neck pain and neck stiffness.   Skin:  Positive for rash (ezcema). Negative for itching and wound.   Neurological:  Negative for dizziness, extremity weakness, gait problem, headaches, light-headedness, numbness, seizures and speech difficulty.   Hematological:  Negative for adenopathy. Does not bruise/bleed easily.   Psychiatric/Behavioral:  Negative for confusion, decreased concentration, depression, sleep disturbance and suicidal ideas. The patient is not nervous/anxious.

## 2025-06-18 NOTE — PROGRESS NOTES
[facility] Gynecologic Oncology  [address], [address]  [place] [address]    GYNECOLOGIC ONCOLOGY  FOLLOW UP VISIT NOTE  PATIENT NAME: Leah Gonzalez  MRN: [id number]  DATE: 06/18/2025     REASON FOR VISIT:      Stage IIIC1r, grade 1, Squamous Cell Carcinoma of the CERVIX   Surveillance visit    HISTORY OF PRESENT ILLNESS:      Leah Gonzalez is a 63yo with a Stage IIIC1r, grade 1, Squamous Cell Cervix Cancer (Diagnosis 2023). Diagnosis has been  established with a cervix biopsy (05/02/23). Staging criteria is met by evidence of metastasis to the left pelvic lymph nodes noted on PET/CT-scan (05/12/23). She has completed initial treatment with 5 cycles of Cisplatin (C5: 07/12/23), concurrent with 4500cGy EBRT and 1000cGy LN boost (COMPLETION DATE: 07/19/23). Baldomero Sleeve placed (07/20/23), by Dr. Mary Roach. Followed by 2850cGy of Intracavitary brachytherapy (F1: 07/25/23. F5: 08/08/23). She presents, unaccompanied, to the office today.     /95 => 164/85    No concerns    She reports no new pain or bleeding and maintains a good appetite.    She has no issues with urination, including the ability to fully empty her bladder, and has not experienced any episodes of incontinence.     She does not experience daytime fatigue or the need for naps.     Her bowel movements are regular and she does not experience nausea, bloating, or early satiety.     She has been diagnosed with either eczema or psoriasis on her foot, which is currently under treatment with a topical cream prescribed by her dermatologist.     She experiences leg cramps, which she attributes to nerve release.     She has not had intercourse for 8 years following her 's death. She uses a dilator.     She experiences severe leg cramps at night, which are unresponsive to stretching exercises. Physical therapy for her leg has not resulted in any improvement.      CHART PREP FROM 02/13/25 TO PRESENT (these details have NOT been

## 2025-07-01 ENCOUNTER — TELEPHONE (OUTPATIENT)
Dept: ORTHOPEDIC SURGERY | Age: 65
End: 2025-07-01

## 2025-07-01 NOTE — TELEPHONE ENCOUNTER
Just FYI- pt cancelled this appt. all together after writer called to try and rancho, due to Dr French' not going to [place] after July 31, 2025.

## 2025-07-04 ENCOUNTER — RESULTS FOLLOW-UP (OUTPATIENT)
Dept: GYNECOLOGIC ONCOLOGY | Age: 65
End: 2025-07-04

## 2025-08-07 ENCOUNTER — TELEMEDICINE (OUTPATIENT)
Dept: FAMILY MEDICINE CLINIC | Age: 65
End: 2025-08-07
Payer: COMMERCIAL

## 2025-08-07 DIAGNOSIS — C53.8 MALIGNANT NEOPLASM OF OVERLAPPING SITES OF CERVIX UTERI (HCC): ICD-10-CM

## 2025-08-07 DIAGNOSIS — R73.01 IFG (IMPAIRED FASTING GLUCOSE): ICD-10-CM

## 2025-08-07 DIAGNOSIS — F41.0 PANIC DISORDER: Primary | ICD-10-CM

## 2025-08-07 DIAGNOSIS — E78.2 MIXED HYPERLIPIDEMIA: ICD-10-CM

## 2025-08-07 DIAGNOSIS — I10 ESSENTIAL HYPERTENSION: ICD-10-CM

## 2025-08-07 PROCEDURE — 99213 OFFICE O/P EST LOW 20 MIN: CPT | Performed by: NURSE PRACTITIONER

## 2025-08-07 RX ORDER — ALPRAZOLAM 0.5 MG
0.5 TABLET ORAL NIGHTLY PRN
Qty: 30 TABLET | Refills: 0 | Status: SHIPPED | OUTPATIENT
Start: 2025-08-07 | End: 2025-09-06

## 2025-08-07 ASSESSMENT — ENCOUNTER SYMPTOMS
CONSTIPATION: 0
BACK PAIN: 0
COLOR CHANGE: 0
COUGH: 0
RHINORRHEA: 0
SORE THROAT: 0
ABDOMINAL DISTENTION: 0
DIARRHEA: 0
CHEST TIGHTNESS: 0
ABDOMINAL PAIN: 0
SHORTNESS OF BREATH: 0
NAUSEA: 0

## 2025-09-01 DIAGNOSIS — M80.00XA AGE-RELATED OSTEOPOROSIS WITH CURRENT PATHOLOGICAL FRACTURE, INITIAL ENCOUNTER: ICD-10-CM

## 2025-09-02 RX ORDER — ALENDRONATE SODIUM 70 MG/1
TABLET ORAL
Qty: 12 TABLET | Refills: 3 | Status: SHIPPED | OUTPATIENT
Start: 2025-09-02

## (undated) DEVICE — SUTURE VICRYL + SZ 2-0 L27IN ABSRB UD CP-1 1/2 CIR REV CUT VCP266H

## (undated) DEVICE — COVER,TABLE,60X90,STERILE: Brand: MEDLINE

## (undated) DEVICE — APPLICATOR MEDICATED 26 CC SOLUTION HI LT ORNG CHLORAPREP

## (undated) DEVICE — SHEET,DRAPE,53X77,STERILE: Brand: MEDLINE

## (undated) DEVICE — DRESSING TRNSPAR W5XL4.5IN FLM SHT SEMIPERMEABLE WIND

## (undated) DEVICE — SPONGE LAP W18XL18IN WHT COT 4 PLY FLD STRUNG RADPQ DISP ST 2 PER PACK

## (undated) DEVICE — SUTURE PROL SZ 0 L30IN NONABSORBABLE BLU L26MM CT-2 1/2 CIR 8412H

## (undated) DEVICE — GLOVE SURG SZ 65 THK91MIL LTX FREE SYN POLYISOPRENE

## (undated) DEVICE — PAD,NON-ADHERENT,3X8,STERILE,LF,1/PK: Brand: MEDLINE

## (undated) DEVICE — JACKSON / MODULAR SPINAL TABLE STYLE POSITIONING KIT - STANDARD: Brand: SOULE MEDICAL

## (undated) DEVICE — BLANKET WRM W40.2XL55.9IN IORT LO BODY + MISTRAL AIR

## (undated) DEVICE — BONE TAMP KIT KPX203PB FF X2 20/3 1 STP: Brand: KYPHOPAK™ TRAY

## (undated) DEVICE — GLOVE SURG SZ 75 L12IN FNGR THK79MIL GRN LTX FREE

## (undated) DEVICE — GLOVE SURG SZ 8 L12IN FNGR THK79MIL GRN LTX FREE

## (undated) DEVICE — NEEDLE, QUINCKE, 18GX3.5": Brand: MEDLINE

## (undated) DEVICE — MIXER A07A CEMENT

## (undated) DEVICE — PAD,SANITARY,11 IN,MAXI,W/WINGS,N-STRL: Brand: MEDLINE

## (undated) DEVICE — ST CHARLES PODIATRY: Brand: MEDLINE INDUSTRIES, INC.

## (undated) DEVICE — GLOVE ORANGE PI 7   MSG9070

## (undated) DEVICE — SHEET, T, LAPAROTOMY, STERILE: Brand: MEDLINE

## (undated) DEVICE — SLEEVE BRACHYTHERAPY CT/MR 6 FR 40X9 MM

## (undated) DEVICE — BLADE,CARBON-STEEL,15,STRL,DISPOSABLE,TB: Brand: MEDLINE

## (undated) DEVICE — STRAP,POSITIONING,KNEE/BODY,FOAM,4X60": Brand: MEDLINE

## (undated) DEVICE — GARMENT,MEDLINE,DVT,INT,CALF,MED, GEN2: Brand: MEDLINE

## (undated) DEVICE — TOWEL,OR,DSP,ST,BLUE,STD,6/PK,12PK/CS: Brand: MEDLINE

## (undated) DEVICE — SUTURE PROL SZ 0 L30IN NONABSORBABLE BLU L36MM CT-1 1/2 CIR 8424H

## (undated) DEVICE — SUTURE ETHILON SZ 3-0 L18IN NONABSORBABLE BLK L24MM FS-1 3/8 663G

## (undated) DEVICE — DRAPE,REIN 53X77,STERILE: Brand: MEDLINE

## (undated) DEVICE — DRAPE,U/ SHT,SPLIT,PLAS,STERIL: Brand: MEDLINE

## (undated) DEVICE — ZIMMER® STERILE DISPOSABLE TOURNIQUET CUFF WITH PLC, DUAL PORT, SINGLE BLADDER, 30 IN. (76 CM)

## (undated) DEVICE — GLOVE ORTHO 8   MSG9480

## (undated) DEVICE — SVMMC GYN MIN PK

## (undated) DEVICE — YANKAUER,FLEXIBLE HANDLE,REGLR CAPACITY: Brand: MEDLINE INDUSTRIES, INC.

## (undated) DEVICE — SINGLE PORT MANIFOLD: Brand: NEPTUNE 2

## (undated) DEVICE — STOCKINETTE,IMPERVIOUS,12X48,STERILE: Brand: MEDLINE

## (undated) DEVICE — DRAPE,UNDRBUT,WHT GRAD PCH,CAPPORT,20/CS: Brand: MEDLINE

## (undated) DEVICE — COUNTER NDL 10 COUNT HLD 20 FOAM BLK SGL MAG

## (undated) DEVICE — MARKER,SKIN,WI/RULER AND LABELS: Brand: MEDLINE

## (undated) DEVICE — GOWN,AURORA,NONREINFORCED,LARGE: Brand: MEDLINE

## (undated) DEVICE — BANDAGE COBAN 4 IN COMPR W4INXL5YD FOAM COHESIVE QUIK STK SELF ADH SFT

## (undated) DEVICE — TUBING, SUCTION, 9/32" X 20', STRAIGHT: Brand: MEDLINE INDUSTRIES, INC.

## (undated) DEVICE — GLOVE ORANGE PI 7 1/2   MSG9075

## (undated) DEVICE — COVER,MAYO STAND,STERILE: Brand: MEDLINE

## (undated) DEVICE — UNDERPANTS MAT L/XL KNIT SEAMLESS CLR CODE WAISTBAND

## (undated) DEVICE — YANKAUER,SMOOTH HANDLE,HIGH CAPACITY: Brand: MEDLINE INDUSTRIES, INC.

## (undated) DEVICE — DRAPE,MEDI-SLUSH,STERILE: Brand: MEDLINE

## (undated) DEVICE — BANDAGE ADH W0.75XL3IN NAT PLAS CURAD

## (undated) DEVICE — SYRINGE MED 10ML LUERLOCK TIP W/O SFTY DISP

## (undated) DEVICE — SNAP KAP: Brand: UNBRANDED

## (undated) DEVICE — GOWN,SIRUS,NONRNF,SETINSLV,XL,20/CS: Brand: MEDLINE